# Patient Record
Sex: MALE | Race: WHITE | NOT HISPANIC OR LATINO | Employment: FULL TIME | URBAN - METROPOLITAN AREA
[De-identification: names, ages, dates, MRNs, and addresses within clinical notes are randomized per-mention and may not be internally consistent; named-entity substitution may affect disease eponyms.]

---

## 2017-03-26 ENCOUNTER — HOSPITAL ENCOUNTER (EMERGENCY)
Facility: CLINIC | Age: 22
Discharge: HOME | End: 2017-03-26
Attending: FAMILY MEDICINE | Admitting: FAMILY MEDICINE
Payer: COMMERCIAL

## 2017-03-26 VITALS
WEIGHT: 170 LBS | DIASTOLIC BLOOD PRESSURE: 75 MMHG | HEART RATE: 80 BPM | SYSTOLIC BLOOD PRESSURE: 136 MMHG | OXYGEN SATURATION: 100 % | BODY MASS INDEX: 25.85 KG/M2 | TEMPERATURE: 97.3 F | RESPIRATION RATE: 16 BRPM

## 2017-03-26 DIAGNOSIS — M62.830 BACK MUSCLE SPASM: ICD-10-CM

## 2017-03-26 PROCEDURE — 99213 OFFICE O/P EST LOW 20 MIN: CPT | Performed by: FAMILY MEDICINE

## 2017-03-26 PROCEDURE — 99212 OFFICE O/P EST SF 10 MIN: CPT

## 2017-03-26 RX ORDER — CYCLOBENZAPRINE HCL 5 MG
5 TABLET ORAL 3 TIMES DAILY PRN
Qty: 21 TABLET | Refills: 0 | Status: SHIPPED | OUTPATIENT
Start: 2017-03-26 | End: 2017-08-16

## 2017-03-26 RX ORDER — NABUMETONE 500 MG/1
500-1000 TABLET, FILM COATED ORAL 2 TIMES DAILY PRN
Qty: 30 TABLET | Refills: 1 | Status: SHIPPED | OUTPATIENT
Start: 2017-03-26 | End: 2017-08-16

## 2017-03-26 ASSESSMENT — ENCOUNTER SYMPTOMS
JOINT SWELLING: 0
GASTROINTESTINAL NEGATIVE: 1
ENDOCRINE NEGATIVE: 1
MYALGIAS: 0
CARDIOVASCULAR NEGATIVE: 1
RESPIRATORY NEGATIVE: 1
CONSTITUTIONAL NEGATIVE: 1
BACK PAIN: 1

## 2017-03-26 NOTE — ED AVS SNAPSHOT
[facility] Emergency Department    [address]    [place] [address]    Phone:  [phone]    Fax:  [phone]                                       Fran Staley   MRN: [id number]    Department:  [facility] Emergency Department   Date of Visit:  3/26/2017           Patient Information     Date Of Birth          1995        Your diagnoses for this visit were:     Back muscle spasm        You were seen by Gladys Jama MD.      Follow-up Information     Follow up with Meche Montalvo PA-C.    Specialty:  Physician Assistant    Why:  If symptoms worsen    Contact information:    [facility]  [address] [facility] [address]  [phone]          Discharge Instructions       When You Have Low Back Pain    Caring for Your Back  You are not alone.    Low back pain is very common. Nearly half of all adults have low back pain in any given year. The good news is that back pain is rarely a danger to your health. Most people can manage their back pain on their own and about half of them start feeling better within 2 weeks. In 9 out of 10 cases, low back pain goes away or no longer limits daily activity within 6 weeks.     Your outlook is good!    Your symptoms tell us that your low back pain is most likely not a danger to you. Most of the time we do not know the exact cause of low back pain, even if you see a doctor or have an MRI. However, treatment can still work without knowing the cause of the pain. Less than 1 in 100 people need surgery for their back pain.     What can I do about my low back pain?     There are three things you can do to ease low back pain and help it go away.    Use heat or cold packs.    Take medicine as directed.    Use positions, movements and exercises.     Using heat or cold packs    Try cold packs or gentle heat to ease your pain. Use whichever gives you the most relief. Apply the cold pack or heat for 15 minutes at a time, as  often as needed.    Taking medicine      If your doctor has prescribed medicine, be sure to follow the directions.    If you take over-the-counter medicine, read and follow the directions.    Talk to your doctor if you have any questions.     Using positions, movements and exercises    Research tells us that moving your joints and muscles can help you recover from back pain. Such activity should be simple and gentle. Use the positions in the photos as well as walking to help relieve your pain. Try taking a short walk every 3 to 4 hours during the day. Walk for a few minutes inside your home or take longer walks outside, on a treadmill or at a mall. Slowly increase the amount of time you walk. Expect discomfort when you begin, but it should lessen as your back starts to heal. When your back feels better, walk daily to keep your back and body healthy.    Finding a comfortable position    When your back pain is new, certain positions will ease your pain. Gently try each of the positions below until you find one that is helpful. Once you find a position of comfort, use it as often as you like when you are resting. You will recover faster if you combine rest with activity.         Lie on your back with your legs bent. You can do this by placing a pillow under your knees. Or you may lie on the floor and rest your lower legs on the seat of a chair.       Lie on your side with your knees bent, and place a pillow between your knees.       Lie on your stomach over pillows.      When should I call my doctor?    Your back pain should improve over the first couple of weeks. As it improves, you should be able to return to your normal activities. But call your doctor if:    You have a sudden change in your ability to control your bladder or bowels.    You feel tingling in your groin or legs.    The pain spreads down your leg and into your foot.    Your toes, feet or leg muscles feel weak.    You feel generally unwell or sick.     Your pain does not get better or gets worse.      If you are deaf or hard of hearing, please let us know. We provide many free services including sign language interpreters,oral interpreters, TTYs, telephone amplifiers, note takers and written materials.    For informational purposes only. Not to replace the advice of your health care provider. Copyright   2013 [facility]. All rights reserved. [employer] 840452 - Rev 06/14.    24 Hour Appointment Hotline       To make an appointment at any [facility] clinic, call [phone] ([phone]). If you don't have a family doctor or clinic, we will help you find one. [facility] clinics are conveniently located to serve the needs of you and your family.             Review of your medicines      START taking        Dose / Directions Last dose taken    cyclobenzaprine 5 MG tablet   Commonly known as:  FLEXERIL   Dose:  5 mg   Quantity:  21 tablet        Take 1 tablet (5 mg) by mouth 3 times daily as needed   Refills:  0        nabumetone 500 MG tablet   Commonly known as:  RELAFEN   Dose:  500-1000 mg   Quantity:  30 tablet        Take 1-2 tablets (500-1,000 mg) by mouth 2 times daily as needed for moderate pain   Refills:  1          Our records show that you are taking the medicines listed below. If these are incorrect, please call your family doctor or clinic.        Dose / Directions Last dose taken    NO ACTIVE MEDICATIONS        Refills:  0        order for DME   Quantity:  1 Device        Equipment being ordered: Thumb spica wrist brace   Refills:  0                Prescriptions were sent or printed at these locations (2 Prescriptions)                   [facility] [place] [id number] [address] [address]    Telephone:  [phone]   Fax:  [phone]   Hours:                  E-Prescribed (2 of 2)         nabumetone (RELAFEN) 500 MG tablet               cyclobenzaprine (FLEXERIL) 5 MG tablet                 Orders Needing Specimen Collection     None      Pending Results     No orders found from 3/24/2017 to 3/27/2017.            Pending Culture Results     No orders found from 3/24/2017 to 3/27/2017.             Test Results from your hospital stay            Thank you for choosing [facility]       Thank you for choosing [facility] for your care. Our goal is always to provide you with excellent care. Hearing back from our patients is one way we can continue to improve our services. Please take a few minutes to complete the written survey that you may receive in the mail after you visit with us. Thank you!        [employer] Information     [employer] gives you secure access to your electronic health record. If you see a primary care provider, you can also send messages to your care team and make appointments. If you have questions, please call your primary care clinic.  If you do not have a primary care provider, please call [phone] and they will assist you.        Care EveryWhere ID     This is your Care EveryWhere ID. This could be used by other organizations to access your [facility] medical records  [id number]        After Visit Summary       This is your record. Keep this with you and show to your community pharmacist(s) and doctor(s) at your next visit.

## 2017-03-26 NOTE — ED AVS SNAPSHOT
[facility] Emergency Department    [address] [address]    Phone:  [phone]    Fax:  [phone]                                       Fran Staley   MRN: [id number]    Department:  [facility] Emergency Department   Date of Visit:  3/26/2017           After Visit Summary Signature Page     I have received my discharge instructions, and my questions have been answered. I have discussed any challenges I see with this plan with the nurse or doctor.    ..........................................................................................................................................  Patient/Patient Representative Signature      ..........................................................................................................................................  Patient Representative Print Name and Relationship to Patient    ..................................................               ................................................  Date                                            Time    ..........................................................................................................................................  Reviewed by Signature/Title    ...................................................              ..............................................  Date                                                            Time

## 2017-03-27 NOTE — ED PROVIDER NOTES
History     Chief Complaint   Patient presents with     Back Pain     HPI  Fran Staley is a 21 year old male who presents with back spasms. Started suddenly this afternoon while having lunch . He does not recollect any injury. He did mention that he was at the gym on Friday but has not done any heavy lifting in the last few days. He has had this on occasion in the past , and he says the spasms come out of nowhere . He has not been to a physical the therapist for this in the past . Patient denies any radiation into his lower legs.   Patient reports that he has not used any medication for this .     I have reviewed the Medications, Allergies, Past Medical and Surgical History, and Social History in the [facility] system.  History reviewed. No pertinent past medical history.    Review of Systems   Constitutional: Negative.    HENT: Negative.    Respiratory: Negative.    Cardiovascular: Negative.    Gastrointestinal: Negative.    Endocrine: Negative.    Genitourinary: Negative.    Musculoskeletal: Positive for back pain. Negative for gait problem, joint swelling and myalgias.   Skin: Negative.        Physical Exam   BP: 136/75  Pulse: 80  Temp: 97.3  F (36.3  C)  Resp: 16  Weight: 77.1 kg (170 lb)  SpO2: 100 %  Physical Exam   Constitutional: He appears well-developed and well-nourished. No distress.   HENT:   Head: Normocephalic and atraumatic.   Eyes: Pupils are equal, round, and reactive to light.   Neck: Normal range of motion. Neck supple.   Cardiovascular: Normal rate, regular rhythm and normal heart sounds.    Pulmonary/Chest: Effort normal and breath sounds normal. No respiratory distress. He has no wheezes. He has no rales. He exhibits no tenderness.   Abdominal: Soft. Bowel sounds are normal.   Musculoskeletal:        Lumbar back: He exhibits decreased range of motion, tenderness, pain and spasm. He exhibits no bony tenderness, no swelling, no edema and no deformity.   Skin: He is not diaphoretic.       ED  Course     ED Course     Procedures                 Labs Ordered and Resulted from Time of ED Arrival Up to the Time of Departure from the ED - No data to display    Assessments & Plan (with Medical Decision Making)     I have reviewed the nursing notes.    I have reviewed the findings, diagnosis, plan and need for follow up with the patient.    New Prescriptions    CYCLOBENZAPRINE (FLEXERIL) 5 MG TABLET    Take 1 tablet (5 mg) by mouth 3 times daily as needed    NABUMETONE (RELAFEN) 500 MG TABLET    Take 1-2 tablets (500-1,000 mg) by mouth 2 times daily as needed for moderate pain       Final diagnoses:   Back muscle spasm   Recommended that he take medication . He will need physical therapy at some point.     3/26/2017   [facility] EMERGENCY DEPARTMENT     Gladys Jama MD  03/26/17 4804

## 2017-03-27 NOTE — DISCHARGE INSTRUCTIONS
When You Have Low Back Pain    Caring for Your Back  You are not alone.    Low back pain is very common. Nearly half of all adults have low back pain in any given year. The good news is that back pain is rarely a danger to your health. Most people can manage their back pain on their own and about half of them start feeling better within 2 weeks. In 9 out of 10 cases, low back pain goes away or no longer limits daily activity within 6 weeks.     Your outlook is good!    Your symptoms tell us that your low back pain is most likely not a danger to you. Most of the time we do not know the exact cause of low back pain, even if you see a doctor or have an MRI. However, treatment can still work without knowing the cause of the pain. Less than 1 in 100 people need surgery for their back pain.     What can I do about my low back pain?     There are three things you can do to ease low back pain and help it go away.    Use heat or cold packs.    Take medicine as directed.    Use positions, movements and exercises.     Using heat or cold packs    Try cold packs or gentle heat to ease your pain. Use whichever gives you the most relief. Apply the cold pack or heat for 15 minutes at a time, as often as needed.    Taking medicine      If your doctor has prescribed medicine, be sure to follow the directions.    If you take over-the-counter medicine, read and follow the directions.    Talk to your doctor if you have any questions.     Using positions, movements and exercises    Research tells us that moving your joints and muscles can help you recover from back pain. Such activity should be simple and gentle. Use the positions in the photos as well as walking to help relieve your pain. Try taking a short walk every 3 to 4 hours during the day. Walk for a few minutes inside your home or take longer walks outside, on a treadmill or at a mall. Slowly increase the amount of time you walk. Expect discomfort when you begin, but it should  lessen as your back starts to heal. When your back feels better, walk daily to keep your back and body healthy.    Finding a comfortable position    When your back pain is new, certain positions will ease your pain. Gently try each of the positions below until you find one that is helpful. Once you find a position of comfort, use it as often as you like when you are resting. You will recover faster if you combine rest with activity.         Lie on your back with your legs bent. You can do this by placing a pillow under your knees. Or you may lie on the floor and rest your lower legs on the seat of a chair.       Lie on your side with your knees bent, and place a pillow between your knees.       Lie on your stomach over pillows.      When should I call my doctor?    Your back pain should improve over the first couple of weeks. As it improves, you should be able to return to your normal activities. But call your doctor if:    You have a sudden change in your ability to control your bladder or bowels.    You feel tingling in your groin or legs.    The pain spreads down your leg and into your foot.    Your toes, feet or leg muscles feel weak.    You feel generally unwell or sick.    Your pain does not get better or gets worse.      If you are deaf or hard of hearing, please let us know. We provide many free services including sign language interpreters,oral interpreters, TTYs, telephone amplifiers, note takers and written materials.    For informational purposes only. Not to replace the advice of your health care provider. Copyright   2013 [facility]. All rights reserved. [employer] 219632 - Rev 06/14.

## 2017-07-26 ENCOUNTER — THERAPY VISIT (OUTPATIENT)
Dept: CHIROPRACTIC MEDICINE | Facility: CLINIC | Age: 22
End: 2017-07-26
Payer: COMMERCIAL

## 2017-07-26 DIAGNOSIS — M99.01 SEGMENTAL DYSFUNCTION OF CERVICAL REGION: Primary | ICD-10-CM

## 2017-07-26 DIAGNOSIS — M99.02 SEGMENTAL DYSFUNCTION OF THORACIC REGION: ICD-10-CM

## 2017-07-26 DIAGNOSIS — M54.6 BILATERAL THORACIC BACK PAIN: ICD-10-CM

## 2017-07-26 PROCEDURE — 99202 OFFICE O/P NEW SF 15 MIN: CPT | Mod: 25 | Performed by: CHIROPRACTOR

## 2017-07-26 PROCEDURE — 98940 CHIROPRACT MANJ 1-2 REGIONS: CPT | Mod: AT | Performed by: CHIROPRACTOR

## 2017-07-26 PROCEDURE — 97035 APP MDLTY 1+ULTRASOUND EA 15: CPT | Performed by: CHIROPRACTOR

## 2017-07-26 NOTE — MR AVS SNAPSHOT
After Visit Summary   7/26/2017    Fran Staley    MRN: [id number]           Patient Information     Date Of Birth          1995        Visit Information        Provider Department      7/26/2017 2:00 PM Susan Ramos DC [facility]        Today's Diagnoses     Segmental dysfunction of cervical region    -  1    Segmental dysfunction of thoracic region        Bilateral thoracic back pain           Follow-ups after your visit        Who to contact     If you have questions or need follow up information about today's clinic visit or your schedule please contact [facility] directly at [phone].  Normal or non-critical lab and imaging results will be communicated to you by [employer], letter or phone within 4 business days after the clinic has received the results. If you do not hear from us within 7 days, please contact the clinic through [employer] or phone. If you have a critical or abnormal lab result, we will notify you by phone as soon as possible.  Submit refill requests through [employer] or call your pharmacy and they will forward the refill request to us. Please allow 3 business days for your refill to be completed.          Additional Information About Your Visit        MyChart Information     [employer] gives you secure access to your electronic health record. If you see a primary care provider, you can also send messages to your care team and make appointments. If you have questions, please call your primary care clinic.  If you do not have a primary care provider, please call [phone] and they will assist you.        Care EveryWhere ID     This is your Care EveryWhere ID. This could be used by other organizations to access your [facility] medical records  [id number]         Blood Pressure from Last 3 Encounters:   03/26/17 136/75   10/09/16 140/78   08/01/16 118/68    Weight from Last 3 Encounters:   03/26/17 77.1 kg (170 lb)    08/01/16 76.2 kg (168 lb)   08/01/16 76.2 kg (168 lb)              We Performed the Following     CHIROPRAC MANIP,SPINAL,1-2 REGIONS     OFFICE/OUTPT VISIT,NEW,LEVL II     ULTRASOUND THERAPY        Primary Care Provider Office Phone # Fax #    Meche Montalvo PA-C [phone]       [facility] [address] [address]        Equal Access to Services     VAMSHI ABBASI : Hadii aad ku hadasho Soomaali, waaxda luqadaha, qaybta kaalmada adeegyada, waxay idiin hayaan adeeg kharash la'kendy . So [facility] [phone].    ATENCIÓN: Si sammie tran, tiene a reed disposición servicios gratuitos de asistencia lingüística. [facility] [phone].    We comply with applicable federal civil rights laws and [place] laws. We do not discriminate on the basis of race, color, national origin, age, disability sex, sexual orientation or gender identity.            Thank you!     Thank you for choosing [facility]  for your care. Our goal is always to provide you with excellent care. Hearing back from our patients is one way we can continue to improve our services. Please take a few minutes to complete the written survey that you may receive in the mail after your visit with us. Thank you!             Your Updated Medication List - Protect others around you: Learn how to safely use, store and throw away your medicines at www.disposemymeds.org.          This list is accurate as of: 7/26/17  2:43 PM.  Always use your most recent med list.                   Brand Name Dispense Instructions for use Diagnosis    cyclobenzaprine 5 MG tablet    FLEXERIL    21 tablet    Take 1 tablet (5 mg) by mouth 3 times daily as needed        nabumetone 500 MG tablet    RELAFEN    30 tablet    Take 1-2 tablets (500-1,000 mg) by mouth 2 times daily as needed for moderate pain        NO ACTIVE MEDICATIONS           order for DME     1 Device    Equipment being ordered: Thumb spica wrist brace     Injury of right thumb, initial encounter, Sprain of metacarpophalangeal joint of right thumb, initial encounter

## 2017-07-26 NOTE — PROGRESS NOTES
Chiropractic Clinic Visit    PCP: Meche Montalvo    Fran Staley is a 22 year old male who is seen as a self referral presenting with pain between the shoulder blades on both sides of his spine. This episode started this past Sunday, he states the pain was present when he woke up. He has had episodes like this before that last about a week, the most recent being this past May, for past episodes he has seen a chiropractor, which helped. For this episode he has tried ibuprofen which helped. Since Sunday he feels like the pain has gotten worse. He describes the pain as sharp, but when he moves side to side or twists he feels the muscle get tight. He also noticed yesterday that his right thumb was numb but that feels better today. He denies pain shooting pain down his arm. Also denies headaches. He currently rates the pain as 8/10, described as dull achy. If he takes a deep breath he notices a sharp pain. At its worse the pain gets to 10/10. He has been sleeping ok. He states that he pain is the same through out the day.     Injury: denies     Location of Pain: bilateral mid back   Duration of Pain: since sunday   Rating of Pain at worst: 10/10  Rating of Pain Currently: 8/10  Symptoms are better with: ibuprofen   Symptoms are worse with: twisting, taking a deep breath        Health History  as reported by the patient:    How does the patient rate their own health:   Good    Current or past medical history:   No red flags identified    Medical allergies  As seen above    Past Traumas/Surgeries  Dislocated right thumb last year- went in for it- had ripped ligaments- was supposed to have surgery but did not    Family History  Diabetes    Medications:  Pain    Occupation:  Tar paving     Primary job tasks:   Driving, Lifting/carrying, Prolonged standing and Repetitive tasks    Barriers as home/work:   Work hasn't been too bad the last few days- hasn't been doing as much labor         Review of  Systems  Musculoskeletal: as above  Remainder of review of systems is negative including constitutional, CV, pulmonary, GI, Skin and Neurologic except as noted in HPI or medical history.    No past medical history on file.  Past Surgical History:   Procedure Laterality Date     none         Objective  There were no vitals taken for this visit.    GENERAL APPEARANCE: healthy, alert and no distress   GAIT: NORMAL  SKIN: no suspicious lesions or rashes  NEURO: Normal strength and tone, mentation intact and speech normal  PSYCH:  mentation appears normal and affect normal/bright    Fran was asked to complete the Neck Disability Index, today in the office. Deya Start Back score: 4; subscore 1; pain severity scale: 8/10..    Cervical Spine Exam    Range of Motion:         forward flexion WNL         extension WNL- pain         lateral rotation WNL        lateral flexion WNL- pain    Inspection:         No visible deformity        normal lordotic curvature maintained    Tender:        paraspinal muscles      Muscle strength:       C4 (shoulder shrug)  symmetric 5/5       C5 (shoulder abduction) symmetric 5/5       C6 (elbow flexion) symmetric 5/5       C7 (elbow extension) symmetric 5/5       C8 (finger abduction, thumb flexion) symmetric 5/5    Reflexes:        C5 (biceps) symmetric normal       C6 (supinator) symmetric normal       C7 (triceps) symmetric normal      Special Tests:       neg (-) Spurling  Distraction - Positive- painful, Shoulder depression - Right negative and Left negative and Cervical compression- negative bilateral     Lymphatics:        no edema noted in the upper extremities       Segmental spinal dysfunction/restrictions found at:  :  C2 RR, LRR  C5 LR, RRR  T1 RR, LRR  T5 F, ER  T8 F, ER.      Trigger points were found in:T-spine paraspinal and Traps    Muscle spasm found in:T-spine paraspinal and Traps      Radiology:  Not indicated at this time- will consider if no improvement with  conservative management.    Assessment:    No diagnosis found.    RX ordered/plan of care: Mechanical back pain and rib dysfunction with associated myospasm and intersegmental dysfunction.  Anticipated outcomes: Patient is expected to receive benefit with care.  Possible risks and side effects: Minimal soreness expected post-adjustment.     After discussing the risk and benefits of care, patient consented to treatment.    Patient's condition:  Patient had restrictions pre-manipulation    Treatment effectiveness:  Post manipulation there is better intersegmental movement and Patient claims to feel looser post manipulation    Plan:    Procedures:    Evaluation and Management:  44456 Low to moderate level exam 20 min    CMT:  87690 Chiropractic manipulative treatment 1-2 regions performed   Cervical: Diversified, C2, C5 , Supine  Thoracic: Diversified, T1, T5, T8, Prone    Modalities:  74821: US:  1.0 Adams/cm squared for 8 minutes at 1.0mhz  continuous pulsed, Location:bilateral thoracic paraspinals     Therapeutic procedures:  None      Prognosis: Excellent      Treatment plan and goals:  Goals:  Decrease pain from 8/10 to 4/10 in 4 visits  Be able to twist without pain       Frequency of care  Duration of care is estimated to be 8 weeks, from the initial treatment.  It is estimated that the patient will need a total of 8 visits to resolve this episode.  For the initial therapeutic trial of care, the frequency is recommended at 1-2 X week, once daily.  A reevaluation would be clinically appropriate in 8 visits, to determine progress and further course of care.    In-Office Treatment  Evaluation  Spinal Chiropractic Manipulative Therapy:  Trial of care. Will re-evaluate in 8 visits.       Recommendations:    Instructions:ice 20 minutes every other hour as needed    Follow-up:  Return to care in 1 week. Do to patients work schedule, he will call when he can come in.      Disclaimer: This note consists of symbols  derived from keyboarding, dictation and/or voice recognition software. As a result, there may be errors in the script that have gone undetected. Please consider this when interpreting information found in this chart.

## 2017-08-16 ENCOUNTER — OFFICE VISIT (OUTPATIENT)
Dept: BEHAVIORAL HEALTH | Facility: CLINIC | Age: 22
End: 2017-08-16
Payer: COMMERCIAL

## 2017-08-16 ENCOUNTER — OFFICE VISIT (OUTPATIENT)
Dept: FAMILY MEDICINE | Facility: CLINIC | Age: 22
End: 2017-08-16
Payer: COMMERCIAL

## 2017-08-16 ENCOUNTER — DOCUMENTATION ONLY (OUTPATIENT)
Dept: FAMILY MEDICINE | Facility: CLINIC | Age: 22
End: 2017-08-16

## 2017-08-16 VITALS
HEART RATE: 82 BPM | RESPIRATION RATE: 20 BRPM | TEMPERATURE: 97.3 F | SYSTOLIC BLOOD PRESSURE: 130 MMHG | WEIGHT: 178.3 LBS | DIASTOLIC BLOOD PRESSURE: 86 MMHG | OXYGEN SATURATION: 97 % | HEIGHT: 68 IN | BODY MASS INDEX: 27.02 KG/M2

## 2017-08-16 DIAGNOSIS — F41.1 GAD (GENERALIZED ANXIETY DISORDER): Primary | ICD-10-CM

## 2017-08-16 DIAGNOSIS — F41.9 ANXIETY: Primary | ICD-10-CM

## 2017-08-16 PROCEDURE — 99214 OFFICE O/P EST MOD 30 MIN: CPT | Performed by: FAMILY MEDICINE

## 2017-08-16 PROCEDURE — 99207 ZZC NO CHARGE BEHAVIORAL WARM HANDOFF: CPT | Performed by: MARRIAGE & FAMILY THERAPIST

## 2017-08-16 ASSESSMENT — PAIN SCALES - GENERAL: PAINLEVEL: NO PAIN (0)

## 2017-08-16 NOTE — NURSING NOTE
"Chief Complaint   Patient presents with     Anxiety       Initial /86 (BP Location: Left arm, Patient Position: Chair, Cuff Size: Adult Regular)  Pulse 82  Temp 97.3  F (36.3  C) (Temporal)  Resp 20  Ht 5' 8.11\" (1.73 m)  Wt 178 lb 4.8 oz (80.9 kg)  SpO2 97%  BMI 27.02 kg/m2 Estimated body mass index is 27.02 kg/(m^2) as calculated from the following:    Height as of this encounter: 5' 8.11\" (1.73 m).    Weight as of this encounter: 178 lb 4.8 oz (80.9 kg).  Medication Reconciliation: complete   Sanna Thornton CMA     Health Maintenance Due   Topic Date Due     HPV IMMUNIZATION (1 of 3 - Male 3 Dose Series) 04/30/2007     PEDS DTAP/TDAP (2 - Td) 11/14/2007       Health Maintenance reviewed at today's visit patient asked to schedule/complete:   Patient made aware.       "

## 2017-08-16 NOTE — MR AVS SNAPSHOT
After Visit Summary   8/16/2017    Fran Staley    MRN: [id number]           Patient Information     Date Of Birth          1995        Visit Information        Provider Department      8/16/2017 3:20 PM Anurag Hendrickson MD [facility]        Today's Diagnoses     SABINA (generalized anxiety disorder)    -  1       Follow-ups after your visit        Your next 10 appointments already scheduled     Aug 28, 2017  4:00 PM CDT   New Visit with MICKY Sol   [facility] ([facility])    [address] [place] [id number] [phone]            Sep 05, 2017  3:20 PM CDT   SHORT with Anurag Hendrickson MD   [facility] ([address] [address] [phone]              Who to contact     If you have questions or need follow up information about today's clinic visit or your schedule please contact [facility] directly at [phone].  Normal or non-critical lab and imaging results will be communicated to you by Excelimmunehart, letter or phone within 4 business days after the clinic has received the results. If you do not hear from us within 7 days, please contact the clinic through Excelimmunehart or phone. If you have a critical or abnormal lab result, we will notify you by phone as soon as possible.  Submit refill requests through Mobile Bridge or call your pharmacy and they will forward the refill request to us. Please allow 3 business days for your refill to be completed.          Additional Information About Your Visit        MyChart Information     Mobile Bridge gives you secure access to your electronic health record. If you see a primary care provider, you can also send messages to your care team and make appointments. If you have questions, please call your primary care clinic.  If you do not have a primary care provider, please call [phone] and they will  "assist you.        Care EveryWhere ID     This is your Care EveryWhere ID. This could be used by other organizations to access your [place] medical records  [id number]        Your Vitals Were     Pulse Temperature Respirations Height Pulse Oximetry BMI (Body Mass Index)    82 97.3  F (36.3  C) (Temporal) 20 5' 8.11\" (1.73 m) 97% 27.02 kg/m2       Blood Pressure from Last 3 Encounters:   08/16/17 130/86   03/26/17 136/75   10/09/16 140/78    Weight from Last 3 Encounters:   08/16/17 178 lb 4.8 oz (80.9 kg)   03/26/17 170 lb (77.1 kg)   08/01/16 168 lb (76.2 kg)              We Performed the Following     Basic metabolic panel     TSH with free T4 reflex          Today's Medication Changes          These changes are accurate as of: 8/16/17  5:00 PM.  If you have any questions, ask your nurse or doctor.               Start taking these medicines.        Dose/Directions    FLUoxetine 20 MG capsule   Commonly known as:  PROzac   Used for:  SABINA (generalized anxiety disorder)   Started by:  Anurag Hendrickson MD        Dose:  20 mg   Take 1 capsule (20 mg) by mouth daily   Quantity:  30 capsule   Refills:  3            Where to get your medicines      These medications were sent to [facility] [place] [id number] [facility]   [address] [facility] , [facility] [address]     Phone:  [phone]     FLUoxetine 20 MG capsule                Primary Care Provider Office Phone # Fax #    Meche Montalvo PA-C [phone] [address] [place] [address]        Equal Access to Services     [facility]: Hadii gray reese hadasho Soomaali, waaxda luqadaha, qaybta kaalmada adeegernesto, vinod forman. So [facility] [phone].    ATENCIÓN: Si habla español, tiene a reed disposición servicios gratuitos de asistencia lingüística. Llame al [phone].    We comply with applicable federal civil rights laws and [place] laws. We do not discriminate on the basis of race, " color, national origin, age, disability sex, sexual orientation or gender identity.            Thank you!     Thank you for choosing [facility]  for your care. Our goal is always to provide you with excellent care. Hearing back from our patients is one way we can continue to improve our services. Please take a few minutes to complete the written survey that you may receive in the mail after your visit with us. Thank you!             Your Updated Medication List - Protect others around you: Learn how to safely use, store and throw away your medicines at www.disposemymeds.org.          This list is accurate as of: 8/16/17  5:00 PM.  Always use your most recent med list.                   Brand Name Dispense Instructions for use Diagnosis    FLUoxetine 20 MG capsule    PROzac    30 capsule    Take 1 capsule (20 mg) by mouth daily    SABINA (generalized anxiety disorder)

## 2017-08-16 NOTE — MR AVS SNAPSHOT
After Visit Summary   8/16/2017    Fran Staley    MRN: [id number]           Patient Information     Date Of Birth          1995        Visit Information        Provider Department      8/16/2017 2:53 PM Nereyda See LMFT [facility]        Today's Diagnoses     Anxiety    -  1       Follow-ups after your visit        Your next 10 appointments already scheduled     Aug 28, 2017  4:00 PM CDT   New Visit with MICKY Sol   [facility]    [address] [place] [id number] [phone]            Sep 05, 2017  3:20 PM CDT   SHORT with Anurag Hendrickson MD   [facility]    [address] [place] [id number] [phone]              Future tests that were ordered for you today     Open Future Orders        Priority Expected Expires Ordered    TSH with free T4 reflex Routine  8/23/2017 8/16/2017    Basic metabolic panel  (Ca, Cl, CO2, Creat, Gluc, K, Na, BUN) Routine  8/23/2017 8/16/2017            Who to contact     If you have questions or need follow up information about today's clinic visit or your schedule please contact [facility] directly at [phone].  Normal or non-critical lab and imaging results will be communicated to you by FoneStarz Mediahart, letter or phone within 4 business days after the clinic has received the results. If you do not hear from us within 7 days, please contact the clinic through FoneStar[employer] Mediahart or phone. If you have a critical or abnormal lab result, we will notify you by phone as soon as possible.  Submit refill requests through Balihoo or call your pharmacy and they will forward the refill request to us. Please allow 3 business days for your refill to be completed.          Additional Information About Your Visit        FoneStarz MediaharBox Information     Balihoo gives you secure access to your electronic health record. If you see a  primary care provider, you can also send messages to your care team and make appointments. If you have questions, please call your primary care clinic.  If you do not have a primary care provider, please call [phone] and they will assist you.        Care EveryWhere ID     This is your Care EveryWhere ID. This could be used by other organizations to access your [place] medical records  [id number]         Blood Pressure from Last 3 Encounters:   08/16/17 130/86   03/26/17 136/75   10/09/16 140/78    Weight from Last 3 Encounters:   08/16/17 80.9 kg (178 lb 4.8 oz)   03/26/17 77.1 kg (170 lb)   08/01/16 76.2 kg (168 lb)              Today, you had the following     No orders found for display         Today's Medication Changes          These changes are accurate as of: 8/16/17 11:59 PM.  If you have any questions, ask your nurse or doctor.               Start taking these medicines.        Dose/Directions    FLUoxetine 20 MG capsule   Commonly known as:  PROzac   Used for:  SABINA (generalized anxiety disorder)   Started by:  Anurag Hendrickson MD        Dose:  20 mg   Take 1 capsule (20 mg) by mouth daily   Quantity:  30 capsule   Refills:  3            Where to get your medicines      These medications were sent to [facility] [address]   [address] [facility] , [facility] [address]     Phone:  [phone]     FLUoxetine 20 MG capsule                Primary Care Provider Office Phone # Fax #    Meche Montalvo PA-C [phone] [address] [place] [address]        Equal Access to Services     [facility]: Hadii aad ku hadasho Soomaali, waaxda luqadaha, qaybta kaalmada darrell, vinod forman. So [facility] [phone].    ATENCIÓN: Si habla español, tiene a reed disposición servicios gratuitos de asistencia lingüística. Llame al [phone].    We comply with applicable federal civil rights laws and [place] laws. We  do not discriminate on the basis of race, color, national origin, age, disability sex, sexual orientation or gender identity.            Thank you!     Thank you for choosing [facility]  for your care. Our goal is always to provide you with excellent care. Hearing back from our patients is one way we can continue to improve our services. Please take a few minutes to complete the written survey that you may receive in the mail after your visit with us. Thank you!             Your Updated Medication List - Protect others around you: Learn how to safely use, store and throw away your medicines at www.disposemymeds.org.          This list is accurate as of: 8/16/17 11:59 PM.  Always use your most recent med list.                   Brand Name Dispense Instructions for use Diagnosis    FLUoxetine 20 MG capsule    PROzac    30 capsule    Take 1 capsule (20 mg) by mouth daily    SABINA (generalized anxiety disorder)

## 2017-08-16 NOTE — PROGRESS NOTES
Patient did not show up for lab. Orders were canceled and reordered as future for 1 week.  Please contact patient to come back in.  Thanks, Elvia Albarado

## 2017-08-16 NOTE — PROGRESS NOTES
"  SUBJECTIVE:                                                    Fran Staley is a 22 year old male who presents to clinic today for the following health issues:    Chief Complaint   Patient presents with     Anxiety        Abnormal Mood Symptoms  Onset: 10 months    Description:   Depression: no  Anxiety: YES- Jumpy, on edge, panic attacks, uncontrollable worrying    Accompanying Signs & Symptoms:  Still participating in activities that you used to enjoy: no  Fatigue: YES  Irritability: no   Difficulty concentrating: YES  Changes in appetite: no  Problems with sleep: YES  Heart racing/beating fast : YES  Thoughts of hurting yourself or others: none    History:   Recent stress: YES- new father  Prior depression hospitalization: None  Family history of depression: no  Family history of anxiety: no    Precipitating factors:   Alcohol/drug use: no    Alleviating factors:  n/a    Therapies Tried and outcome: None    No flowsheet data found.    No flowsheet data found.                   ROS:  Constitutional, HEENT, cardiovascular, pulmonary, gi and gu systems are negative, except as otherwise noted.      OBJECTIVE:                                                    /86 (BP Location: Left arm, Patient Position: Chair, Cuff Size: Adult Regular)  Pulse 82  Temp 97.3  F (36.3  C) (Temporal)  Resp 20  Ht 5' 8.11\" (1.73 m)  Wt 178 lb 4.8 oz (80.9 kg)  SpO2 97%  BMI 27.02 kg/m2  Body mass index is 27.02 kg/(m^2).    Well-appearing and in no distress. Mood \"okay\". Affect, mentation appropriate. Insight and judgment intact. speech normal rate and content. No evidence of kareem, SI, HI. No psychosis. Anxious      Diagnostic Test Results:  none      ASSESSMENT/PLAN:                                                        ICD-10-CM    1. SABINA (generalized anxiety disorder) F41.1 FLUoxetine (PROZAC) 20 MG capsule     TSH with free T4 reflex     Basic metabolic panel       Discussed generalized anxiety disorder, and " etiology and treatment options. He elects to try counseling and medication, which I think is wise given the severity of his anxiety today. Routine labs will be checked. Follow-up with me in 2-4 weeks for recheck. Call sooner if any concerns. Black box warning discussed.    Anurag Hendrickson MD  [facility]

## 2017-08-17 NOTE — PROGRESS NOTES
Warm-handoff    C met with patient, by PCP request. C informed and explained integrated health model, use of brief therapy interventions, as well as referrals and support services for ongoing long-term therapy.  Patient reports that he has been feeling more anxious and has been having daily panic attacks since around Thanksgiving of last year. He states that he is not sure what the trigger is. He states that he is a new parent, however his baby is only a couple months old. Patient will be starting a medication today, which he disclosed he's nervous about, however is willing to give it a try. Patient scheduled an initial visit with this writer for 8/28/17.

## 2017-08-27 ENCOUNTER — HEALTH MAINTENANCE LETTER (OUTPATIENT)
Age: 22
End: 2017-08-27

## 2017-10-17 ENCOUNTER — OFFICE VISIT (OUTPATIENT)
Dept: FAMILY MEDICINE | Facility: CLINIC | Age: 22
End: 2017-10-17
Payer: COMMERCIAL

## 2017-10-17 VITALS
HEART RATE: 61 BPM | WEIGHT: 182 LBS | HEIGHT: 69 IN | TEMPERATURE: 97.9 F | RESPIRATION RATE: 14 BRPM | SYSTOLIC BLOOD PRESSURE: 122 MMHG | DIASTOLIC BLOOD PRESSURE: 82 MMHG | BODY MASS INDEX: 26.96 KG/M2 | OXYGEN SATURATION: 96 %

## 2017-10-17 DIAGNOSIS — Z23 NEED FOR PROPHYLACTIC VACCINATION AND INOCULATION AGAINST INFLUENZA: ICD-10-CM

## 2017-10-17 DIAGNOSIS — Z23 NEED FOR VACCINATION: ICD-10-CM

## 2017-10-17 DIAGNOSIS — Z00.00 ROUTINE GENERAL MEDICAL EXAMINATION AT A HEALTH CARE FACILITY: Primary | ICD-10-CM

## 2017-10-17 DIAGNOSIS — F41.1 GAD (GENERALIZED ANXIETY DISORDER): ICD-10-CM

## 2017-10-17 PROBLEM — M99.02 SEGMENTAL DYSFUNCTION OF THORACIC REGION: Status: RESOLVED | Noted: 2017-07-26 | Resolved: 2017-10-17

## 2017-10-17 PROBLEM — M99.01 SEGMENTAL DYSFUNCTION OF CERVICAL REGION: Status: RESOLVED | Noted: 2017-07-26 | Resolved: 2017-10-17

## 2017-10-17 PROCEDURE — 90715 TDAP VACCINE 7 YRS/> IM: CPT | Performed by: FAMILY MEDICINE

## 2017-10-17 PROCEDURE — 99395 PREV VISIT EST AGE 18-39: CPT | Mod: 25 | Performed by: FAMILY MEDICINE

## 2017-10-17 PROCEDURE — 90472 IMMUNIZATION ADMIN EACH ADD: CPT | Performed by: FAMILY MEDICINE

## 2017-10-17 PROCEDURE — 99213 OFFICE O/P EST LOW 20 MIN: CPT | Mod: 25 | Performed by: FAMILY MEDICINE

## 2017-10-17 PROCEDURE — 90471 IMMUNIZATION ADMIN: CPT | Performed by: FAMILY MEDICINE

## 2017-10-17 PROCEDURE — 90686 IIV4 VACC NO PRSV 0.5 ML IM: CPT | Performed by: FAMILY MEDICINE

## 2017-10-17 ASSESSMENT — PAIN SCALES - GENERAL: PAINLEVEL: NO PAIN (0)

## 2017-10-17 NOTE — NURSING NOTE
"Chief Complaint   Patient presents with     Physical       Initial /82 (BP Location: Right arm, Patient Position: Sitting, Cuff Size: Adult Regular)  Pulse 61  Temp 97.9  F (36.6  C) (Temporal)  Resp 14  Ht 5' 9\" (1.753 m)  Wt 182 lb (82.6 kg)  SpO2 96%  BMI 26.88 kg/m2 Estimated body mass index is 26.88 kg/(m^2) as calculated from the following:    Height as of this encounter: 5' 9\" (1.753 m).    Weight as of this encounter: 182 lb (82.6 kg).  Medication Reconciliation: complete     Elise Mora MA 10/17/2017    Screening Questionnaire for Adult Immunization    Are you sick today?   No   Do you have allergies to medications, food, a vaccine component or latex?   No   Have you ever had a serious reaction after receiving a vaccination?   No   Do you have a long-term health problem with heart disease, lung disease, asthma, kidney disease, metabolic disease (e.g. diabetes), anemia, or other blood disorder?   No   Do you have cancer, leukemia, HIV/AIDS, or any other immune system problem?   No   In the past 3 months, have you taken medications that affect  your immune system, such as prednisone, other steroids, or anticancer drugs; drugs for the treatment of rheumatoid arthritis, Crohn s disease, or psoriasis; or have you had radiation treatments?   No   Have you had a seizure, or a brain or other nervous system problem?   No   During the past year, have you received a transfusion of blood or blood     products, or been given immune (gamma) globulin or antiviral drug?   No   For women: Are you pregnant or is there a chance you could become        pregnant during the next month?   No   Have you received any vaccinations in the past 4 weeks?   No     Immunization questionnaire answers were all negative.        Per orders of Dr. Hendrickson, injection of Adacel given by Gianna Mora. Patient instructed to remain in clinic for 15 minutes afterwards, and to report any adverse reaction to me immediately.     "   Screening performed by Gianna Mora on 10/17/2017 at 2:46 PM.

## 2017-10-17 NOTE — PROGRESS NOTES
SUBJECTIVE:   CC: Fran Staley is an 22 year old male who presents for preventative health visit.     Physical   Annual:     Getting at least 3 servings of Calcium per day::  Yes    Bi-annual eye exam::  NO    Dental care twice a year::  Yes    Sleep apnea or symptoms of sleep apnea::  None    Diet::  Regular (no restrictions)    Frequency of exercise::  2-3 days/week    Duration of exercise::  30-45 minutes    Taking medications regularly::  Yes    Medication side effects::  None    Additional concerns today::  YES      Anxiety at night is worse.     Better with prozac, but still bad in the evening      Today's PHQ-2 Score:   PHQ-2 ( 1999 Pfizer) 10/17/2017   Q1: Little interest or pleasure in doing things 0   Q2: Feeling down, depressed or hopeless 0   PHQ-2 Score 0   Q1: Little interest or pleasure in doing things Not at all   Q2: Feeling down, depressed or hopeless Not at all   PHQ-2 Score 0       Abuse: Current or Past(Physical, Sexual or Emotional)- No  Do you feel safe in your environment - Yes    Social History   Substance Use Topics     Smoking status: Never Smoker     Smokeless tobacco: Never Used     Alcohol use Yes      Comment: once a week     The patient does not drink >3 drinks per day nor >7 drinks per week.    Last PSA: No results found for: PSA    Reviewed orders with patient. Reviewed health maintenance and updated orders accordingly - Yes      Reviewed and updated as needed this visit by clinical staff  Tobacco  Allergies  Meds  Soc Hx        Reviewed and updated as needed this visit by Provider            ROS:  C: NEGATIVE for fever, chills, change in weight  I: NEGATIVE for worrisome rashes, moles or lesions  E: NEGATIVE for vision changes or irritation  ENT: NEGATIVE for ear, mouth and throat problems  R: NEGATIVE for significant cough or SOB  CV: NEGATIVE for chest pain, palpitations or peripheral edema  GI: NEGATIVE for nausea, abdominal pain, heartburn, or change in bowel habits    "male: negative for dysuria, hematuria, decreased urinary stream, erectile dysfunction, urethral discharge  M: NEGATIVE for significant arthralgias or myalgia  N: NEGATIVE for weakness, dizziness or paresthesias  P: NEGATIVE for changes in mood or affect    OBJECTIVE:   /82 (BP Location: Right arm, Patient Position: Sitting, Cuff Size: Adult Regular)  Pulse 61  Temp 97.9  F (36.6  C) (Temporal)  Resp 14  Ht 5' 9\" (1.753 m)  Wt 182 lb (82.6 kg)  SpO2 96%  BMI 26.88 kg/m2    EXAM:  GENERAL: healthy, alert and no distress  NECK: no adenopathy, no asymmetry, masses, or scars and thyroid normal to palpation  RESP: lungs clear to auscultation - no rales, rhonchi or wheezes  CV: regular rate and rhythm, normal S1 S2, no S3 or S4, no murmur, click or rub, no peripheral edema and peripheral pulses strong  ABDOMEN: soft, nontender, no hepatosplenomegaly, no masses and bowel sounds normal  MS: no gross musculoskeletal defects noted, no edema    ASSESSMENT/PLAN:       ICD-10-CM    1. Routine general medical examination at a health care facility Z00.00    2. SABINA (generalized anxiety disorder) F41.1 FLUoxetine (PROZAC) 20 MG capsule   3. Need for vaccination Z23 TDAP VACCINE (ADACEL) [50766.002]   4. Need for prophylactic vaccination and inoculation against influenza Z23 1st  Administration  [95978]     FLU VAC, SPLIT VIRUS IM > 3 YO (QUADRIVALENT) [43191]     Routine physical. Vaccinations up-to-date. No current cancer screening recommendations. Anxiety worsening. We'll increase Prozac to 40 mg daily. Seen back in one month. Strongly consider counseling    Increase to 40 daily      COUNSELING:   Reviewed preventive health counseling, as reflected in patient instructions         reports that he has never smoked. He has never used smokeless tobacco.    Estimated body mass index is 26.88 kg/(m^2) as calculated from the following:    Height as of this encounter: 5' 9\" (1.753 m).    Weight as of this encounter: 182 lb " (82.6 kg).       Counseling Resources:  ATP IV Guidelines  Pooled Cohorts Equation Calculator  FRAX Risk Assessment  ICSI Preventive Guidelines  Dietary Guidelines for Americans, 2010  USDA's MyPlate  ASA Prophylaxis  Lung CA Screening    Anurag Hendrickson MD  [facility] for HPI/ROS submitted by the patient on 10/17/2017   PHQ-2 Score: 0

## 2017-10-17 NOTE — PROGRESS NOTES
Injectable Influenza Immunization Documentation    1.  Is the person to be vaccinated sick today?   No    2. Does the person to be vaccinated have an allergy to a component   of the vaccine?   No    3. Has the person to be vaccinated ever had a serious reaction   to influenza vaccine in the past?   No    4. Has the person to be vaccinated ever had Guillain-Barré syndrome?   No    Form completed by Elise Mora MA 10/17/2017

## 2017-10-17 NOTE — MR AVS SNAPSHOT
After Visit Summary   10/17/2017    Fran Staley    MRN: [id number]           Patient Information     Date Of Birth          1995        Visit Information        Provider Department      10/17/2017 2:00 PM Anurag Hendrickson MD [facility]        Today's Diagnoses     Routine general medical examination at a health care facility    -  1    SABINA (generalized anxiety disorder)        Need for vaccination        Need for prophylactic vaccination and inoculation against influenza           Follow-ups after your visit        Your next 10 appointments already scheduled     Dec 15, 2017  9:00 AM CST   SHORT with Anurag Hendrickson MD   [facility]    [address] [place] [id number] [phone]              Who to contact     If you have questions or need follow up information about today's clinic visit or your schedule please contact [facility] directly at [phone].  Normal or non-critical lab and imaging results will be communicated to you by MyChart, letter or phone within 4 business days after the clinic has received the results. If you do not hear from us within 7 days, please contact the clinic through [employer] or phone. If you have a critical or abnormal lab result, we will notify you by phone as soon as possible.  Submit refill requests through [employer] or call your pharmacy and they will forward the refill request to us. Please allow 3 business days for your refill to be completed.          Additional Information About Your Visit        MyChart Information     [employer] gives you secure access to your electronic health record. If you see a primary care provider, you can also send messages to your care team and make appointments. If you have questions, please call your primary care clinic.  If you do not have a primary care provider, please call [phone] and they will assist you.        Care  "EveryWhere ID     This is your Care EveryWhere ID. This could be used by other organizations to access your [facility] medical records  [id number]        Your Vitals Were     Pulse Temperature Respirations Height Pulse Oximetry BMI (Body Mass Index)    61 97.9  F (36.6  C) (Temporal) 14 5' 9\" (1.753 m) 96% 26.88 kg/m2       Blood Pressure from Last 3 Encounters:   10/17/17 122/82   08/16/17 130/86   03/26/17 136/75    Weight from Last 3 Encounters:   10/17/17 182 lb (82.6 kg)   08/16/17 178 lb 4.8 oz (80.9 kg)   03/26/17 170 lb (77.1 kg)              We Performed the Following     1st  Administration  [23466]     FLU VAC, SPLIT VIRUS IM > 3 YO (QUADRIVALENT) [78761]     TDAP VACCINE (ADACEL) [46185.002]          Today's Medication Changes          These changes are accurate as of: 10/17/17  3:55 PM.  If you have any questions, ask your nurse or doctor.               These medicines have changed or have updated prescriptions.        Dose/Directions    FLUoxetine 20 MG capsule   Commonly known as:  PROzac   This may have changed:  how much to take   Used for:  SABINA (generalized anxiety disorder)   Changed by:  Anurag Hendrickson MD        Dose:  40 mg   Take 2 capsules (40 mg) by mouth daily   Quantity:  30 capsule   Refills:  3                Primary Care Provider Office Phone # Fax #    Meche Montalvo PA-C [phone]       [address] [place], [place] [id number]        Equal Access to Services     PAULINE ABBASI AH: Hadii gray marcum Solachelle, waaxda luqadaha, qaybta kaalmavinod gerber. So [facility] [phone].    ATENCIÓN: Si habla español, tiene a reed disposición servicios gratuitos de asistencia lingüística. Llame al [phone].    We comply with applicable federal civil rights laws and [place] laws. We do not discriminate on the basis of race, color, national origin, age, disability, sex, sexual orientation, or gender identity.            Thank you! "     Thank you for choosing [facility]  for your care. Our goal is always to provide you with excellent care. Hearing back from our patients is one way we can continue to improve our services. Please take a few minutes to complete the written survey that you may receive in the mail after your visit with us. Thank you!             Your Updated Medication List - Protect others around you: Learn how to safely use, store and throw away your medicines at www.disposemymeds.org.          This list is accurate as of: 10/17/17  3:55 PM.  Always use your most recent med list.                   Brand Name Dispense Instructions for use Diagnosis    FLUoxetine 20 MG capsule    PROzac    30 capsule    Take 2 capsules (40 mg) by mouth daily    SABINA (generalized anxiety disorder)

## 2017-10-26 DIAGNOSIS — F41.1 GAD (GENERALIZED ANXIETY DISORDER): ICD-10-CM

## 2017-10-26 NOTE — TELEPHONE ENCOUNTER
Pt is in need of a refill on fluoxetine, the dose increase was put into the chart historically.    Thanks,    David Fischer, Pharm D  [facility]  [phone]

## 2017-10-27 NOTE — TELEPHONE ENCOUNTER
Prozac  Routing refill request to provider for review/approval because:  Medication is reported/historical    David Orozco RN, BSN

## 2017-12-01 ENCOUNTER — APPOINTMENT (OUTPATIENT)
Dept: CT IMAGING | Facility: CLINIC | Age: 22
End: 2017-12-01
Attending: EMERGENCY MEDICINE
Payer: COMMERCIAL

## 2017-12-01 ENCOUNTER — HOSPITAL ENCOUNTER (EMERGENCY)
Facility: CLINIC | Age: 22
Discharge: HOME | End: 2017-12-01
Attending: EMERGENCY MEDICINE | Admitting: EMERGENCY MEDICINE
Payer: COMMERCIAL

## 2017-12-01 VITALS
TEMPERATURE: 98.7 F | BODY MASS INDEX: 26.89 KG/M2 | SYSTOLIC BLOOD PRESSURE: 126 MMHG | DIASTOLIC BLOOD PRESSURE: 67 MMHG | WEIGHT: 182.1 LBS | RESPIRATION RATE: 18 BRPM | OXYGEN SATURATION: 97 % | HEART RATE: 80 BPM

## 2017-12-01 DIAGNOSIS — K08.89 PAIN, DENTAL: ICD-10-CM

## 2017-12-01 DIAGNOSIS — R22.0 RIGHT FACIAL SWELLING: ICD-10-CM

## 2017-12-01 PROCEDURE — 25000128 H RX IP 250 OP 636: Performed by: EMERGENCY MEDICINE

## 2017-12-01 PROCEDURE — 99285 EMERGENCY DEPT VISIT HI MDM: CPT | Mod: 25 | Performed by: EMERGENCY MEDICINE

## 2017-12-01 PROCEDURE — 25000125 ZZHC RX 250: Performed by: EMERGENCY MEDICINE

## 2017-12-01 PROCEDURE — 96375 TX/PRO/DX INJ NEW DRUG ADDON: CPT | Mod: 59 | Performed by: EMERGENCY MEDICINE

## 2017-12-01 PROCEDURE — 96365 THER/PROPH/DIAG IV INF INIT: CPT | Mod: 59 | Performed by: EMERGENCY MEDICINE

## 2017-12-01 PROCEDURE — 70487 CT MAXILLOFACIAL W/DYE: CPT

## 2017-12-01 PROCEDURE — S0077 INJECTION, CLINDAMYCIN PHOSP: HCPCS | Performed by: EMERGENCY MEDICINE

## 2017-12-01 PROCEDURE — 99285 EMERGENCY DEPT VISIT HI MDM: CPT | Mod: Z6 | Performed by: EMERGENCY MEDICINE

## 2017-12-01 RX ORDER — IOPAMIDOL 755 MG/ML
80 INJECTION, SOLUTION INTRAVASCULAR ONCE
Status: COMPLETED | OUTPATIENT
Start: 2017-12-01 | End: 2017-12-01

## 2017-12-01 RX ORDER — HYDROCODONE BITARTRATE AND ACETAMINOPHEN 5; 325 MG/1; MG/1
TABLET ORAL
Refills: 0 | COMMUNITY
Start: 2017-11-28 | End: 2018-03-12

## 2017-12-01 RX ORDER — FLUOXETINE 40 MG/1
CAPSULE ORAL
Refills: 3 | COMMUNITY
Start: 2017-11-20 | End: 2018-03-12

## 2017-12-01 RX ORDER — HYDROMORPHONE HYDROCHLORIDE 2 MG/1
2 TABLET ORAL EVERY 4 HOURS PRN
Qty: 12 TABLET | Refills: 0 | Status: SHIPPED | OUTPATIENT
Start: 2017-12-01 | End: 2018-03-12

## 2017-12-01 RX ORDER — HYDROMORPHONE HYDROCHLORIDE 1 MG/ML
0.5 INJECTION, SOLUTION INTRAMUSCULAR; INTRAVENOUS; SUBCUTANEOUS ONCE
Status: COMPLETED | OUTPATIENT
Start: 2017-12-01 | End: 2017-12-01

## 2017-12-01 RX ORDER — CLINDAMYCIN PHOSPHATE 900 MG/50ML
900 INJECTION, SOLUTION INTRAVENOUS ONCE
Status: COMPLETED | OUTPATIENT
Start: 2017-12-01 | End: 2017-12-01

## 2017-12-01 RX ORDER — CLINDAMYCIN HCL 150 MG
450 CAPSULE ORAL 3 TIMES DAILY
Qty: 63 CAPSULE | Refills: 0 | Status: SHIPPED | OUTPATIENT
Start: 2017-12-01 | End: 2017-12-08

## 2017-12-01 RX ADMIN — HYDROMORPHONE HYDROCHLORIDE 0.5 MG: 1 INJECTION, SOLUTION INTRAMUSCULAR; INTRAVENOUS; SUBCUTANEOUS at 06:33

## 2017-12-01 RX ADMIN — CLINDAMYCIN PHOSPHATE 900 MG: 18 INJECTION, SOLUTION INTRAVENOUS at 07:00

## 2017-12-01 RX ADMIN — SODIUM CHLORIDE 80 ML: 9 INJECTION, SOLUTION INTRAVENOUS at 06:46

## 2017-12-01 RX ADMIN — IOPAMIDOL 80 ML: 755 INJECTION, SOLUTION INTRAVENOUS at 06:47

## 2017-12-01 NOTE — DISCHARGE INSTRUCTIONS
Return if symptoms worsen or new symptoms develop.  Follow-up with dentist as soon as possible.  Take antibiotics as directed.  Pain medication as directed if increased pain fevers increased facial swelling or other symptoms present please return for further evaluation and care.  Rinse mouth with salt water cleaned socket areas out as directed per dentist.

## 2017-12-01 NOTE — ED AVS SNAPSHOT
[facility]    [address] [place] [address]    Phone:  [phone]    Fax:  [phone]                                       Fran Staley   MRN: [id number]    Department:  [facility]   Date of Visit:  12/1/2017           Patient Information     Date Of Birth          1995        Your diagnoses for this visit were:     Pain, dental post op    Right facial swelling        You were seen by Rodrigo Peñaloza MD.      Follow-up Information     Follow up with Meche Montalvo PA-C.    Specialty:  Physician Assistant    Why:  As needed    Contact information:    [address] [place] [address]  [phone]          Follow up with [facility].    Specialty:  EMERGENCY MEDICINE    Why:  If symptoms worsen    Contact information:    [address] [place] [phone]    Additional information:    The medical center is located at   [address] [place] [address] (between [facility] and   [address], [place] miles north   of [place]).        Follow up with Meche Montalvo PA-C.    Specialty:  Physician Assistant    Why:  Next week for recheck.    Contact information:    [address] [place] [address]  [phone]          Follow up with [facility].    Specialty:  EMERGENCY MEDICINE    Why:  If symptoms worsen    Contact information:    [address] [place] [phone]    Additional information:    The medical center is located at   [address] [place] [address] ([address]  in [place], [place] miles north   of [place]).        Discharge Instructions       Return if symptoms worsen or new symptoms develop.  Follow-up with dentist as soon as possible.  Take antibiotics as directed.  Pain medication as directed if increased pain fevers increased facial swelling or other symptoms present please return for further  evaluation and care.  Rinse mouth with salt water cleaned socket areas out as directed per dentist.    Discharge References/Attachments     DENTAL PAIN (ENGLISH)    TOOTH ABSCESS (ENGLISH)      Future Appointments        Provider Department Dept Phone Center    12/4/2017 11:00 AM Sameer Duckworth DO [facility] [phone] [facility]    12/15/2017 9:00 AM Anurag Hendrickson MD [facility] [phone] [facility]      24 Hour Appointment Hotline       To make an appointment at any [facility], call [phone] ([phone]). If you don't have a family doctor or clinic, we will help you find one. [facility] are conveniently located to serve the needs of you and your family.             Review of your medicines      START taking        Dose / Directions Last dose taken    clindamycin 150 MG capsule   Commonly known as:  CLEOCIN   Dose:  450 mg   Quantity:  63 capsule        Take 3 capsules (450 mg) by mouth 3 times daily for 7 days   Refills:  0        HYDROmorphone 2 MG tablet   Commonly known as:  DILAUDID   Dose:  2 mg   Quantity:  12 tablet        Take 1 tablet (2 mg) by mouth every 4 hours as needed   Refills:  0          Our records show that you are taking the medicines listed below. If these are incorrect, please call your family doctor or clinic.        Dose / Directions Last dose taken    * FLUoxetine 20 MG capsule   Commonly known as:  PROzac   Dose:  40 mg   Quantity:  60 capsule        Take 2 capsules (40 mg) by mouth daily   Refills:  3        * FLUoxetine 40 MG capsule   Commonly known as:  PROzac        TAKE ONE CAPSULE BY MOUTH ONCE DAILY   Refills:  3        HYDROcodone-acetaminophen 5-325 MG per tablet   Commonly known as:  NORCO        TAKE ONE TABLET BY MOUTH EVERY 6 HOURS AS NEEDED FOR PAIN DO NOT EXCEED 4000MG ACETAMINOPHEN/DAY *CAUTION: OPIOID. RISK OF OVERDOSE AND COLEEN   Refills:  0        * Notice:  This list has 2  medication(s) that are the same as other medications prescribed for you. Read the directions carefully, and ask your doctor or other care provider to review them with you.            Prescriptions were sent or printed at these locations (2 Prescriptions)                   Other Prescriptions                Printed at Department/Unit printer (2 of 2)         clindamycin (CLEOCIN) 150 MG capsule               HYDROmorphone (DILAUDID) 2 MG tablet                Procedures and tests performed during your visit     CT Maxillofacial w Contrast      Orders Needing Specimen Collection     None      Pending Results     Date and Time Order Name Status Description    12/1/2017 0621 CT Maxillofacial w Contrast Preliminary             Pending Culture Results     No orders found from 11/29/2017 to 12/2/2017.            Pending Results Instructions     If you had any lab results that were not finalized at the time of your Discharge, you can call the ED Lab Result RN at [phone]. You will be contacted by this team for any positive Lab results or changes in treatment. The nurses are available 7 days a week from 10A to 6:30P.  You can leave a message 24 hours per day and they will return your call.        Test Results From Your Hospital Stay        12/1/2017  7:49 AM      Narrative     CT SCAN OF THE FACE WITH CONTRAST 12/1/2017 6:55 AM     HISTORY: Right-sided facial swelling. Rule out abscess.     TECHNIQUE: Axial scans of the face following intravenous contrast with  sagittal and coronal reformations. Radiation dose for this scan was  reduced using automated exposure control, adjustment of the mA and/or  kV according to patient size, or iterative reconstruction technique.  80 mL Isovue-370    COMPARISON: None.    FINDINGS: The right third mandibular and maxillary molars are absent  as is the left third maxillary molar with soft tissue in the teeth  sockets. There are small foci of air within the right third maxillary  tooth  socket and apparent dehiscence of the overlying alveolar cortex  with soft tissue extension into the right maxillary sinus. There is  asymmetric polypoid mucosal thickening in the right maxillary sinus  inferiorly that could be due to odontogenic sinusitis. There is also a  small foci of air within the left maxillary tooth socket without  obvious dehiscence of the overlying alveolar cortex.    There is a small hypodense pocket containing foci of air within the  right third maxillary tooth socket. No well-formed loculated abscess  is identified in this area. This could be postoperative in nature  although developing abscess cannot be excluded. There is subcutaneous  reticulation and stranding of the overlying soft tissues of the right  face.    Aside from the right maxillary sinus, there is scattered mucosal  thickening within the ethmoid air cells, left maxillary sinus, and  left sphenoid sinus.    Submandibular and parotid glands are unremarkable.    No suspicious lymph nodes within the visualized neck.    No suspicious osseous lesions.        Impression     IMPRESSION:  1. Postoperative changes following recent removal of right third  mandibular and maxillary molars as well as the left third maxillary  molar.  2. Subcutaneous reticulation and stranding of the right face. No  definite loculated abscess is appreciated although there is a  hypodense collection containing foci of air within the right third  maxillary tooth socket without significant surrounding enhancement.  This could represent postoperative change although early developing  abscess cannot be excluded.  3. Asymmetric polypoid mucosal thickening within the right maxillary  sinus inferiorly with possible dehiscence of the alveolar cortex  overlying the now absent right maxillary molar. This could have  represented odontogenic sinusitis.                    Thank you for choosing [facility]       Thank you for choosing [facility] for your care. Our goal is  always to provide you with excellent care. Hearing back from our patients is one way we can continue to improve our services. Please take a few minutes to complete the written survey that you may receive in the mail after you visit with us. Thank you!        [employer] Information     [employer] gives you secure access to your electronic health record. If you see a primary care provider, you can also send messages to your care team and make appointments. If you have questions, please call your primary care clinic.  If you do not have a primary care provider, please call [phone] and they will assist you.        Care EveryWhere ID     This is your Care EveryWhere ID. This could be used by other organizations to access your [facility] medical records  [id number]        Equal Access to Services     VAMSHI ABBASI : Chris Page, ayesha weems, rodger ramírez, vinod forman. So [facility] [phone].    ATENCIÓN: Si habla español, tiene a reed disposición servicios gratuitos de asistencia lingüística. Llame al [phone].    We comply with applicable federal civil rights laws and [place] laws. We do not discriminate on the basis of race, color, national origin, age, disability, sex, sexual orientation, or gender identity.            After Visit Summary       This is your record. Keep this with you and show to your community pharmacist(s) and doctor(s) at your next visit.

## 2017-12-01 NOTE — ED AVS SNAPSHOT
[facility]    [address] [facility] [address]    Phone:  [phone]    Fax:  [phone]                                       Fran Staley   MRN: [id number]    Department:  [facility]   Date of Visit:  12/1/2017           After Visit Summary Signature Page     I have received my discharge instructions, and my questions have been answered. I have discussed any challenges I see with this plan with the nurse or doctor.    ..........................................................................................................................................  Patient/Patient Representative Signature      ..........................................................................................................................................  Patient Representative Print Name and Relationship to Patient    ..................................................               ................................................  Date                                            Time    ..........................................................................................................................................  Reviewed by Signature/Title    ...................................................              ..............................................  Date                                                            Time

## 2017-12-01 NOTE — ED PROVIDER NOTES
History     Chief Complaint   Patient presents with     Dental Problem     HPI  Fran Staley is a 22 year old male who had his wisdom teeth removed on Tuesday presents emergency department complaining of significant pain and swelling of the right side of his face and jaw.  Patient states symptoms began the day after surgery called his dentist on Wednesday but was told to give it some time.  Pain increased and he finished up his hydrocodone yesterday and contacted his dentist who stated they were out of the office until Monday.  Patient rates his pain a 8 out of 10 and worse with movement of the jaw.  He has moderate facial swelling.  He is able swallow without difficulty.  States he is having a headache.  Denies any fevers or chills has not had any difficulty swallowing or difficulty breathing.    Problem List:    Patient Active Problem List    Diagnosis Date Noted     SABINA (generalized anxiety disorder) 10/17/2017     Priority: Medium     Bilateral thoracic back pain 07/26/2017     Priority: Medium     Drug allergy 03/20/2012     Priority: Medium     March 20, 2012 hives from amoxicillin              Past Medical History:    No past medical history on file.    Past Surgical History:    Past Surgical History:   Procedure Laterality Date     none         Family History:    Family History   Problem Relation Age of Onset     DIABETES Maternal Grandfather      HEART DISEASE Maternal Grandfather      Myocardial Infarction Maternal Uncle        Social History:  Marital Status:  Single [1]  Social History   Substance Use Topics     Smoking status: Never Smoker     Smokeless tobacco: Never Used     Alcohol use Yes      Comment: once a week        Medications:      FLUoxetine (PROZAC) 40 MG capsule   HYDROcodone-acetaminophen (NORCO) 5-325 MG per tablet   FLUoxetine (PROZAC) 20 MG capsule         Review of Systems   Constitutional: Positive for appetite change. Negative for chills and fever.   HENT: Positive for dental  problem. Negative for congestion, sore throat and trouble swallowing.    Eyes: Negative for pain.   Respiratory: Negative for cough, chest tightness and shortness of breath.    Cardiovascular: Negative for chest pain.   Gastrointestinal: Negative for abdominal pain, nausea and vomiting.   Musculoskeletal: Negative for back pain and myalgias.   Skin: Negative for rash.   Neurological: Positive for headaches. Negative for weakness and numbness.   Psychiatric/Behavioral: Negative for confusion.       Physical Exam   BP: 147/86  Pulse: 80  Temp: 98.7  F (37.1  C)  Resp: 18  Weight: 82.6 kg (182 lb 1.6 oz)  SpO2: 99 %      Physical Exam   Constitutional: He appears well-developed and well-nourished. No distress.   HENT:   Head: Normocephalic.   Moderate swelling noted to the R jaw/buccal mucosa. Post op changes noted to the posterior molar regions B with swelling of gingiva in the upper and lower R region. No active drainage no obvious abscess is palpable. Posterior pharynx without erythema/edema.    Eyes: Conjunctivae are normal.   Neck: Normal range of motion. Neck supple.   Pulmonary/Chest: Effort normal.   Neurological: He is alert. He exhibits normal muscle tone.   Skin: Skin is warm and dry. No rash noted.   Psychiatric: He has a normal mood and affect.   Nursing note and vitals reviewed.      ED Course     ED Course     Procedures               Critical Care time:  none               Results for orders placed or performed during the hospital encounter of 12/01/17   CT Maxillofacial w Contrast    Narrative    CT SCAN OF THE FACE WITH CONTRAST 12/1/2017 6:55 AM     HISTORY: Right-sided facial swelling. Rule out abscess.     TECHNIQUE: Axial scans of the face following intravenous contrast with  sagittal and coronal reformations. Radiation dose for this scan was  reduced using automated exposure control, adjustment of the mA and/or  kV according to patient size, or iterative reconstruction technique.  80 mL  Isovue-[id number]    COMPARISON: None.    FINDINGS: The right third mandibular and maxillary molars are absent  as is the left third maxillary molar with soft tissue in the teeth  sockets. There are small foci of air within the right third maxillary  tooth socket and apparent dehiscence of the overlying alveolar cortex  with soft tissue extension into the right maxillary sinus. There is  asymmetric polypoid mucosal thickening in the right maxillary sinus  inferiorly that could be due to odontogenic sinusitis. There is also a  small foci of air within the left maxillary tooth socket without  obvious dehiscence of the overlying alveolar cortex.    There is a small hypodense pocket containing foci of air within the  right third maxillary tooth socket. No well-formed loculated abscess  is identified in this area. This could be postoperative in nature  although developing abscess cannot be excluded. There is subcutaneous  reticulation and stranding of the overlying soft tissues of the right  face.    Aside from the right maxillary sinus, there is scattered mucosal  thickening within the ethmoid air cells, left maxillary sinus, and  left sphenoid sinus.    Submandibular and parotid glands are unremarkable.    No suspicious lymph nodes within the visualized neck.    No suspicious osseous lesions.      Impression    IMPRESSION:  1. Postoperative changes following recent removal of right third  mandibular and maxillary molars as well as the left third maxillary  molar.  2. Subcutaneous reticulation and stranding of the right face. No  definite loculated abscess is appreciated although there is a  hypodense collection containing foci of air within the right third  maxillary tooth socket without significant surrounding enhancement.  This could represent postoperative change although early developing  abscess cannot be excluded.  3. Asymmetric polypoid mucosal thickening within the right maxillary  sinus inferiorly with possible  dehiscence of the alveolar cortex  overlying the now absent right maxillary molar. This could have  represented odontogenic sinusitis.      SHERRIE ESPINOZA MD         Assessments & Plan (with Medical Decision Making)records were reviewed. CT scan of face was obtained to r/o an abscess. Patient was given dilaudid for pain. Patient was covered with clindamycin iv. CT scan without evidence of abscess . Findings discussed with the patient. He will follow up with his dentist and continue oral antibiotics . He should return if symptoms worsen or new symptoms develop.      I have reviewed the nursing notes.    I have reviewed the findings, diagnosis, plan and need for follow up with the patient.       Discharge Medication List as of 12/1/2017  8:25 AM      START taking these medications    Details   clindamycin (CLEOCIN) 150 MG capsule Take 3 capsules (450 mg) by mouth 3 times daily for 7 days, Disp-63 capsule, R-0, Local Print      HYDROmorphone (DILAUDID) 2 MG tablet Take 1 tablet (2 mg) by mouth every 4 hours as needed, Disp-12 tablet, R-0, Local Print             Final diagnoses:   Pain, dental - post op   Right facial swelling       12/1/2017   [facility] EMERGENCY DEPARTMENT     Rodrigo Peñaloza MD  12/03/17 1938

## 2017-12-01 NOTE — ED NOTES
[place] teeth out on Tuesday called dentist on Wednesday due to increasing pain and swelling was advised to give it a little time yesterday significant increase inboth pain and swelling on R side has been taking Vicodin and ibuprofen regularly called dentist office was advised she wouldn't be in until Monday   Pt presents with flushed cheeks   Pain and swelling on R

## 2017-12-03 ASSESSMENT — ENCOUNTER SYMPTOMS
TROUBLE SWALLOWING: 0
EYE PAIN: 0
CONFUSION: 0
MYALGIAS: 0
APPETITE CHANGE: 1
HEADACHES: 1
SORE THROAT: 0
FEVER: 0
NAUSEA: 0
SHORTNESS OF BREATH: 0
NUMBNESS: 0
COUGH: 0
CHILLS: 0
WEAKNESS: 0
VOMITING: 0
BACK PAIN: 0
CHEST TIGHTNESS: 0
ABDOMINAL PAIN: 0

## 2017-12-05 ENCOUNTER — OFFICE VISIT (OUTPATIENT)
Dept: ORTHOPEDICS | Facility: CLINIC | Age: 22
End: 2017-12-05
Payer: COMMERCIAL

## 2017-12-05 VITALS
SYSTOLIC BLOOD PRESSURE: 122 MMHG | BODY MASS INDEX: 26.96 KG/M2 | WEIGHT: 182 LBS | DIASTOLIC BLOOD PRESSURE: 72 MMHG | HEIGHT: 69 IN

## 2017-12-05 DIAGNOSIS — S63.601D SPRAIN OF RIGHT THUMB, SUBSEQUENT ENCOUNTER: ICD-10-CM

## 2017-12-05 DIAGNOSIS — S69.91XD INJURY OF RIGHT THUMB, SUBSEQUENT ENCOUNTER: Primary | ICD-10-CM

## 2017-12-05 PROCEDURE — 99214 OFFICE O/P EST MOD 30 MIN: CPT | Performed by: PEDIATRICS

## 2017-12-05 NOTE — PROGRESS NOTES
"Sports Medicine Clinic Visit    PCP: Meche Montalvo    Fran Staley is a 22 year old male who is seen in f/u up for Injury of right thumb, subsequent encounter. Since last visit on 8/1/2016 patient has continued to have pain in his right thumb. He states he wore the splint for 6 weeks but never had improvement in his thumb pain.  Feels worse with activities.   No new injury    **  Previous injury, patient hyperextended thumb catching a canoe.  Currently, has aching pain in the right thumb: diffuse through the thumb.  Swelling occurs after activity.  No bruising.      Review of Systems  All other systems reviewed and are negative unless noted above.    This document serves as a record of the services and decisions personally performed and made by Sameer Duckworth DO, CAQ. It was created on his behalf by Tarik Redman, a trained medical scribe. The creation of this document is based the provider's statements to the medical scribe.  Tarik Redman December 5, 2017 9:16 AM       No past medical history on file.  Past Surgical History:   Procedure Laterality Date     none         Objective  /72  Ht 5' 9\" (1.753 m)  Wt 182 lb (82.6 kg)  BMI 26.88 kg/m2    GENERAL APPEARANCE: healthy, alert and no distress   GAIT: NORMAL  SKIN: no suspicious lesions or rashes  NEURO: Normal strength and tone, mentation intact and speech normal  PSYCH:  mentation appears normal and affect normal/bright  HEENT: no scleral icterus  CV: no extremity edema   RESP: nonlabored breathing     Exam  Hand/wrist (right):    Inspection:  No deformity noted. Mild swelling around the MCP joint of the thumb. No ecchymosis      Motion:  Forearm pronation grossly full , forearm supination grossly full .  Wrist flexion grossly full   Wrist extension grossly full   Wrist radial deviation grossly full   Wrist ulnar deviation grossly full   Digit motion mild limitation with active flexion of the thumb at MCP joint, stiff, mild pain; " "minimal limitation extension of the thumb, grossly symmetric passive motion   Abduction, adduction full, symmetric    Sensation:  Grossly intact    Radial pulses normal, +2/4, capillary refill brisk.    Palpation:  Tender at volar, less at ulnar aspect, of base of thumb--MCP joint  Nontender remainder of the hand     Special Tests:       Stability testing of the MCP joint grossly symmetric , no clear laxity of UCL on left, minimal pain with testing    Radiology  Visualized MRI of right thumb from 8/5/2016, and reviewed the images with the patient.  Impression: distal UCL sprain. Note in report below, it should read \"right.\"      MR LEFT FINGER WITHOUT CONTRAST  8/5/2016 8:14 AM     HISTORY:  Unspecified injury of right wrist, hand and finger(s),  subsequent encounter.     COMPARISON: None.     TECHNIQUE: Routine multiplanar, multisequence imaging was performed.     FINDINGS:  There is an ulnar collateral ligament tear from its distal  attachment on the proximal phalanx of the thumb. It is not proximally  retracted and remains deep to the adductor aponeurosis, and there is  no Stener lesion.     No evidence of fracture. No significant joint effusion. No tendon  abnormalities.         IMPRESSION: Distal ulnar collateral ligament tear. No significant  proximal retraction. Tear remains deep to the adductor aponeurosis.   No evidence of Stener lesion.     TREY CORDOVA MD    Assessment:  1. Injury of right thumb, subsequent encounter    2. Sprain of right thumb, subsequent encounter        Plan:  Discussed the assessment with the patient.    Pertinent imaging of the area reviewed with the patient.    Discussed:  *Symptom Treatment   *Activity Modification   *Rehab - Hand Therapy  *Imaging - Updated MRI of the left thumb  *Referral to Surgeon - pending MRI results   *Injection - Corticosteroid injection not advised     Topical Treatments: Ice prn   Over the counter medication: Patient's preferred OTC medication as directed " on packaging.  MRI of the left thumb   Activity Modification: as discussed   We discussed potential for rehab, given stiffness; if sprain healed, then I think rehab is best option. If not, then possibly referral to ortho surgeon.  Follow up: call with results of MRI  Questions answered. The patient indicates understanding of these issues and agrees with the plan.    Sameer Duckworth DO, CAQ       Disclaimer: This note consists of symbols derived from keyboarding, dictation and/or voice recognition software. As a result, there may be errors in the script that have gone undetected. Please consider this when interpreting information found in this chart.

## 2017-12-05 NOTE — MR AVS SNAPSHOT
After Visit Summary   12/5/2017    Fran Staley    MRN: [id number]           Patient Information     Date Of Birth          1995        Visit Information        Provider Department      12/5/2017 8:40 AM Sameer Duckworth, DO [facility]        Today's Diagnoses     Injury of right thumb, subsequent encounter    -  1    Sprain of right thumb, subsequent encounter          Care Instructions     Advanced imaging is done by appointment. Some insurance require a prior authorization to be completed which may delay the time until you are able to schedule your appointment.   Please call [place] [place] [facility] [place]: [phone] to schedule your MRI.  Depending on your availability you can usually schedule within the next 1-2 days.    The clinic will call you with results, if you have not heard from the clinic within 3-4 days following your MRI please contact us at the number listed below.       If you have any further questions for your physician or physician s care team you can call [phone] and use option 3 to leave a voice message. Calls received during business hours will be returned same day.                  Follow-ups after your visit        Your next 10 appointments already scheduled     Dec 15, 2017  9:00 AM CST   [facility] with [facility] Hendrickson [facility]   [facility]    [address] [place] [id number] [phone]              Future tests that were ordered for you today     Open Future Orders        Priority Expected Expires Ordered    MR Finger Right w/o Contrast Routine  12/5/2018 12/5/2017            Who to contact     If you have questions or need follow up information about today's clinic visit or your schedule please contact [facility] directly at [phone].  Normal or non-critical lab and imaging results will be communicated to you by [facility]  "letter or phone within 4 business days after the clinic has received the results. If you do not hear from us within 7 days, please contact the clinic through [employer] or phone. If you have a critical or abnormal lab result, we will notify you by phone as soon as possible.  Submit refill requests through [employer] or call your pharmacy and they will forward the refill request to us. Please allow 3 business days for your refill to be completed.          Additional Information About Your Visit        [employer] Information     [employer] gives you secure access to your electronic health record. If you see a primary care provider, you can also send messages to your care team and make appointments. If you have questions, please call your primary care clinic.  If you do not have a primary care provider, please call [phone] and they will assist you.        Care EveryWhere ID     This is your Care EveryWhere ID. This could be used by other organizations to access your [facility] medical records  [id number]        Your Vitals Were     Height BMI (Body Mass Index)                5' 9\" (1.753 m) 26.88 kg/m2           Blood Pressure from Last 3 Encounters:   12/05/17 122/72   12/01/17 126/67   10/17/17 122/82    Weight from Last 3 Encounters:   12/05/17 182 lb (82.6 kg)   12/01/17 182 lb 1.6 oz (82.6 kg)   10/17/17 182 lb (82.6 kg)               Primary Care Provider Office Phone # Fax #    Meche Montalvo PA-C [phone] [address] [place], [place] [id number]        Equal Access to Services     PAULINE ABBASI : Hadii aad ku hadasho Soomaali, waaxda luqadaha, qaybta kaalmada adeegyajaime, vniod marina . So [facility] [phone].    ATENCIÓN: Si habla español, tiene a reed disposición servicios gratuitos de asistencia lingüística. Llame al [phone].    We comply with applicable federal civil rights laws and [place] laws. We do not discriminate on the basis of race, color, national " origin, age, disability, sex, sexual orientation, or gender identity.            Thank you!     Thank you for choosing [facility]  for your care. Our goal is always to provide you with excellent care. Hearing back from our patients is one way we can continue to improve our services. Please take a few minutes to complete the written survey that you may receive in the mail after your visit with us. Thank you!             Your Updated Medication List - Protect others around you: Learn how to safely use, store and throw away your medicines at www.disposemymeds.org.          This list is accurate as of: 12/5/17  9:34 AM.  Always use your most recent med list.                   Brand Name Dispense Instructions for use Diagnosis    clindamycin 150 MG capsule    CLEOCIN    63 capsule    Take 3 capsules (450 mg) by mouth 3 times daily for 7 days        * FLUoxetine 20 MG capsule    PROzac    60 capsule    Take 2 capsules (40 mg) by mouth daily    SABINA (generalized anxiety disorder)       * FLUoxetine 40 MG capsule    PROzac     TAKE ONE CAPSULE BY MOUTH ONCE DAILY        HYDROcodone-acetaminophen 5-325 MG per tablet    NORCO     TAKE ONE TABLET BY MOUTH EVERY 6 HOURS AS NEEDED FOR PAIN DO NOT EXCEED 4000MG ACETAMINOPHEN/DAY *CAUTION: OPIOID. RISK OF OVERDOSE AND COLEEN        HYDROmorphone 2 MG tablet    DILAUDID    12 tablet    Take 1 tablet (2 mg) by mouth every 4 hours as needed        * Notice:  This list has 2 medication(s) that are the same as other medications prescribed for you. Read the directions carefully, and ask your doctor or other care provider to review them with you.

## 2017-12-05 NOTE — LETTER
"    12/5/2017         RE: Fran Staley  [address] [facility] [address]        Dear Colleague,    Thank you for referring your patient, Fran Staley, to the [facility]. Please see a copy of my visit note below.    Sports Medicine Clinic Visit    PCP: Meche Montalvo    Fran Staley is a 22 year old male who is seen in f/u up for Injury of right thumb, subsequent encounter. Since last visit on 8/1/2016 patient has continued to have pain in his right thumb. He states he wore the splint for 6 weeks but never had improvement in his thumb pain.  Feels worse with activities.   No new injury    **  Previous injury, patient hyperextended thumb catching a canoe.  Currently, has aching pain in the right thumb: diffuse through the thumb.  Swelling occurs after activity.  No bruising.      Review of Systems  All other systems reviewed and are negative unless noted above.    This document serves as a record of the services and decisions personally performed and made by Sameer Duckworth DO, CAQ. It was created on his behalf by Tarik Redman, a trained medical scribe. The creation of this document is based the provider's statements to the medical scribe.  Tarik Redman December 5, 2017 9:16 AM       No past medical history on file.  Past Surgical History:   Procedure Laterality Date     none         Objective  /72  Ht 5' 9\" (1.753 m)  Wt 182 lb (82.6 kg)  BMI 26.88 kg/m2    GENERAL APPEARANCE: healthy, alert and no distress   GAIT: NORMAL  SKIN: no suspicious lesions or rashes  NEURO: Normal strength and tone, mentation intact and speech normal  PSYCH:  mentation appears normal and affect normal/bright  HEENT: no scleral icterus  CV: no extremity edema   RESP: nonlabored breathing     Exam  Hand/wrist (right):    Inspection:  No deformity noted. Mild swelling around the MCP joint of the thumb. No ecchymosis      Motion:  Forearm pronation grossly full , forearm " "supination grossly full .  Wrist flexion grossly full   Wrist extension grossly full   Wrist radial deviation grossly full   Wrist ulnar deviation grossly full   Digit motion mild limitation with active flexion of the thumb at MCP joint, stiff, mild pain; minimal limitation extension of the thumb, grossly symmetric passive motion   Abduction, adduction full, symmetric    Sensation:  Grossly intact    Radial pulses normal, +2/4, capillary refill brisk.    Palpation:  Tender at volar, less at ulnar aspect, of base of thumb--MCP joint  Nontender remainder of the hand     Special Tests:       Stability testing of the MCP joint grossly symmetric , no clear laxity of UCL on left, minimal pain with testing    Radiology  Visualized MRI of right thumb from 8/5/2016, and reviewed the images with the patient.  Impression: distal UCL sprain. Note in report below, it should read \"right.\"      MR LEFT FINGER WITHOUT CONTRAST  8/5/2016 8:14 AM     HISTORY:  Unspecified injury of right wrist, hand and finger(s),  subsequent encounter.     COMPARISON: None.     TECHNIQUE: Routine multiplanar, multisequence imaging was performed.     FINDINGS:  There is an ulnar collateral ligament tear from its distal  attachment on the proximal phalanx of the thumb. It is not proximally  retracted and remains deep to the adductor aponeurosis, and there is  no Stener lesion.     No evidence of fracture. No significant joint effusion. No tendon  abnormalities.         IMPRESSION: Distal ulnar collateral ligament tear. No significant  proximal retraction. Tear remains deep to the adductor aponeurosis.   No evidence of Stener lesion.     TREY CORDOVA MD    Assessment:  1. Injury of right thumb, subsequent encounter    2. Sprain of right thumb, subsequent encounter        Plan:  Discussed the assessment with the patient.    Pertinent imaging of the area reviewed with the patient.    Discussed:  *Symptom Treatment   *Activity Modification   *Rehab - Hand " Therapy  *Imaging - Updated MRI of the left thumb  *Referral to Surgeon - pending MRI results   *Injection - Corticosteroid injection not advised     Topical Treatments: Ice prn   Over the counter medication: Patient's preferred OTC medication as directed on packaging.  MRI of the left thumb   Activity Modification: as discussed   We discussed potential for rehab, given stiffness; if sprain healed, then I think rehab is best option. If not, then possibly referral to ortho surgeon.  Follow up: call with results of MRI  Questions answered. The patient indicates understanding of these issues and agrees with the plan.    Sameer Duckworth DO, CAQ       Disclaimer: This note consists of symbols derived from keyboarding, dictation and/or voice recognition software. As a result, there may be errors in the script that have gone undetected. Please consider this when interpreting information found in this chart.        Again, thank you for allowing me to participate in the care of your patient.        Sincerely,        Sameer Duckworth DO

## 2017-12-05 NOTE — PATIENT INSTRUCTIONS
Advanced imaging is done by appointment. Some insurance require a prior authorization to be completed which may delay the time until you are able to schedule your appointment.   Please call [facility]: [phone] to schedule your MRI.  Depending on your availability you can usually schedule within the next 1-2 days.    The clinic will call you with results, if you have not heard from the clinic within 3-4 days following your MRI please contact us at the number listed below.       If you have any further questions for your physician or physician s care team you can call [phone] and use option 3 to leave a voice message. Calls received during business hours will be returned same day.

## 2017-12-06 ENCOUNTER — MYC MEDICAL ADVICE (OUTPATIENT)
Dept: FAMILY MEDICINE | Facility: CLINIC | Age: 22
End: 2017-12-06

## 2017-12-06 ENCOUNTER — HOSPITAL ENCOUNTER (OUTPATIENT)
Dept: MRI IMAGING | Facility: CLINIC | Age: 22
Discharge: HOME | End: 2017-12-06
Attending: PEDIATRICS | Admitting: PEDIATRICS
Payer: COMMERCIAL

## 2017-12-06 DIAGNOSIS — S63.601D SPRAIN OF RIGHT THUMB, SUBSEQUENT ENCOUNTER: ICD-10-CM

## 2017-12-06 PROCEDURE — 73221 MRI JOINT UPR EXTREM W/O DYE: CPT | Mod: RT

## 2017-12-07 ENCOUNTER — TELEPHONE (OUTPATIENT)
Dept: ORTHOPEDICS | Facility: CLINIC | Age: 22
End: 2017-12-07

## 2017-12-07 DIAGNOSIS — S63.601D SPRAIN OF RIGHT THUMB, SUBSEQUENT ENCOUNTER: Primary | ICD-10-CM

## 2017-12-07 NOTE — TELEPHONE ENCOUNTER
Pt KATM, calling for MRI results. Said he realizes it has only been a day since MRI, but would like to know results as soon as he can because he may need to get procedures scheduled before the end of the year for insurance coverage purposes.

## 2017-12-07 NOTE — TELEPHONE ENCOUNTER
Appears UCL sprain healed. Report below. Still with soreness at this joint, with limited motion. Suggest hand therapy next, f/u 4-6 weeks if not improving with therapy. Thanks.  Sameer Duckworth DO, CAQ

## 2017-12-07 NOTE — TELEPHONE ENCOUNTER
MR THUMB RIGHT WITHOUT CONTRAST December 6, 2017 5:08 PM      HISTORY: Evaluate ulnar collateral ligament. Sprain of right thumb,  subsequent encounter.     TECHNIQUE: Coronal oblique T1, gradient echo, and proton density,  sagittal T1, and transverse T1, fat-suppressed T2, and inversion  recovery pulse sequences are submitted.     FINDINGS: There is no acute signal abnormality associated with the  ulnar collateral ligament. There is no apparent tear or ligament  displacement. The relationship of the ulnar collateral ligament to the  adductor aponeurosis appears within normal limits with no evidence of  Stener lesion. The metacarpophalangeal joint appears congruent. No  joint effusion is demonstrated. Marrow signal within the thumb appears  within normal limits. The flexor and extensor tendons appear within  normal limits. Intramuscular signal within the thenar eminence appears  within normal limits.         IMPRESSION: No evidence of acute ulnar collateral ligament tear or  Stener lesion at the right thumb metacarpophalangeal joint. No osseous  or soft tissue signal abnormality is demonstrated within the thumb.     GARLAND OWEN MD

## 2017-12-08 NOTE — TELEPHONE ENCOUNTER
Hand therapy order placed      Patient agrees to plan    Closing encounter. No further action required.

## 2018-03-12 ENCOUNTER — OFFICE VISIT (OUTPATIENT)
Dept: FAMILY MEDICINE | Facility: CLINIC | Age: 23
End: 2018-03-12
Payer: COMMERCIAL

## 2018-03-12 VITALS
SYSTOLIC BLOOD PRESSURE: 139 MMHG | HEIGHT: 69 IN | BODY MASS INDEX: 29.62 KG/M2 | DIASTOLIC BLOOD PRESSURE: 82 MMHG | WEIGHT: 200 LBS | HEART RATE: 62 BPM | TEMPERATURE: 97.7 F

## 2018-03-12 DIAGNOSIS — R03.0 ELEVATED BLOOD PRESSURE READING WITHOUT DIAGNOSIS OF HYPERTENSION: ICD-10-CM

## 2018-03-12 DIAGNOSIS — F41.9 ANXIETY: Primary | ICD-10-CM

## 2018-03-12 DIAGNOSIS — Z71.85 HPV VACCINE COUNSELING: ICD-10-CM

## 2018-03-12 PROCEDURE — 99214 OFFICE O/P EST MOD 30 MIN: CPT | Performed by: PHYSICIAN ASSISTANT

## 2018-03-12 ASSESSMENT — PAIN SCALES - GENERAL: PAINLEVEL: NO PAIN (0)

## 2018-03-12 ASSESSMENT — ANXIETY QUESTIONNAIRES
GAD7 TOTAL SCORE: 12
1. FEELING NERVOUS, ANXIOUS, OR ON EDGE: MORE THAN HALF THE DAYS
6. BECOMING EASILY ANNOYED OR IRRITABLE: SEVERAL DAYS
3. WORRYING TOO MUCH ABOUT DIFFERENT THINGS: MORE THAN HALF THE DAYS
7. FEELING AFRAID AS IF SOMETHING AWFUL MIGHT HAPPEN: NEARLY EVERY DAY
2. NOT BEING ABLE TO STOP OR CONTROL WORRYING: MORE THAN HALF THE DAYS
5. BEING SO RESTLESS THAT IT IS HARD TO SIT STILL: NOT AT ALL

## 2018-03-12 ASSESSMENT — PATIENT HEALTH QUESTIONNAIRE - PHQ9: 5. POOR APPETITE OR OVEREATING: MORE THAN HALF THE DAYS

## 2018-03-12 NOTE — PROGRESS NOTES
SUBJECTIVE:   Fran Staley is a 22 year old male who presents to clinic today for the following health issues:      Anxiety Follow-Up    Status since last visit: Stopped taking Prozac about 1.5 weeks ago and anxiety was doing well, but does feel anxiety is coming back. Does not want to be on Prozac, has gained 20 pounds while being on it, can't sleep at night as well    Other associated symptoms:None    Complicating factors:   Significant life event: No   Current substance abuse: None  Depression symptoms: No  SABINA-7 SCORE 3/12/2018   Total Score 12       SABINA-7      Amount of exercise or physical activity: 4-5 days/week for an average of 30-45 minutes    Problems taking medications regularly: Stopped taking Prozac    Medication side effects: Insomnia, weight gain    Diet: regular (no restrictions)      Lifelong anxiety, a bit better in high school for awhile.  Prozac worked well but had a lot of wt gain and poor sleep - wakes 3-4 times per night.  Currently has new job, working 80 hrs/wk, and has young child - feels counseling not an option at this time.    Discuss HPV vaccine.    Elev BP - drank Red Bull prior to appt.  BPs appear to be 120s to sometimes 140.    Problem list and histories reviewed & adjusted, as indicated.  Additional history: as documented    BP Readings from Last 3 Encounters:   03/12/18 139/82   12/05/17 122/72   12/01/17 126/67    Wt Readings from Last 3 Encounters:   03/12/18 200 lb (90.7 kg)   12/05/17 182 lb (82.6 kg)   12/01/17 182 lb 1.6 oz (82.6 kg)           Reviewed and updated as needed this visit by clinical staff  Tobacco  Allergies  Meds  Problems  Med Hx  Surg Hx  Fam Hx  Soc Hx        Reviewed and updated as needed this visit by Provider  Tobacco  Allergies  Meds  Problems  Med Hx  Surg Hx  Fam Hx  Soc Hx          ROS:  Constitutional, cardiovascular, pulmonary, and psych systems are negative, except as otherwise noted.    OBJECTIVE:     /82 (BP Location:  "Right arm, Patient Position: Chair, Cuff Size: Adult Large)  Pulse 62  Temp 97.7  F (36.5  C) (Tympanic)  Ht 5' 9\" (1.753 m)  Wt 200 lb (90.7 kg)  BMI 29.53 kg/m2  Body mass index is 29.53 kg/(m^2).  GENERAL: healthy, alert and no distress  PSYCH: mentation appears normal, affect normal/bright  ASSESSMENT/PLAN:       ICD-10-CM    1. Anxiety F41.9 sertraline (ZOLOFT) 50 MG tablet   2. HPV vaccine counseling Z71.89    3. Elevated blood pressure reading without diagnosis of hypertension R03.0    he will keep eye on BPs and have awareness of possible Red Bull effect  He will consider gardasil    Patient Instructions   Switch prozac to zoloft  Refilled x 1 yr - if questions, use My Chart or call.   If not doing well, Evisit  Counseling when life slows down        Meche Montalvo PA-C  [facility]    "

## 2018-03-12 NOTE — PATIENT INSTRUCTIONS
Switch prozac to zoloft  Refilled x 1 yr - if questions, use My Chart or call.   If not doing well, Evisit  Counseling when life slows down

## 2018-03-12 NOTE — NURSING NOTE
"Chief Complaint   Patient presents with     Anxiety       Initial /82 (BP Location: Right arm, Patient Position: Chair, Cuff Size: Adult Large)  Pulse 62  Temp 97.7  F (36.5  C) (Tympanic)  Ht 5' 9\" (1.753 m)  Wt 200 lb (90.7 kg)  BMI 29.53 kg/m2 Estimated body mass index is 29.53 kg/(m^2) as calculated from the following:    Height as of this encounter: 5' 9\" (1.753 m).    Weight as of this encounter: 200 lb (90.7 kg).      Health Maintenance that is potentially due pending provider review:  NONE        Is there anyone who you would like to be able to receive your results? No  If yes have patient fill out BRIANNA      "

## 2018-03-12 NOTE — MR AVS SNAPSHOT
"              After Visit Summary   3/12/2018    Fran Staley    MRN: [id number]           Patient Information     Date Of Birth          1995        Visit Information        Provider Department      3/12/2018 2:40 PM Meche Montalvo PA-C [facility]        Today's Diagnoses     Anxiety    -  1      Care Instructions    Switch prozac to zoloft  Refilled x 1 yr - if questions, use My Chart or call.   If not doing well, Evisit  Counseling when life slows down            Follow-ups after your visit        Who to contact     If you have questions or need follow up information about today's clinic visit or your schedule please contact [facility] directly at [phone].  Normal or non-critical lab and imaging results will be communicated to you by [employer], letter or phone within 4 business days after the clinic has received the results. If you do not hear from us within 7 days, please contact the clinic through [employer] or phone. If you have a critical or abnormal lab result, we will notify you by phone as soon as possible.  Submit refill requests through [email or web address] or call your pharmacy and they will forward the refill request to us. Please allow 3 business days for your refill to be completed.          Additional Information About Your Visit        MyChart Information     [email or web address] gives you secure access to your electronic health record. If you see a primary care provider, you can also send messages to your care team and make appointments. If you have questions, please call your primary care clinic.  If you do not have a primary care provider, please call [phone] and they will assist you.        Care EveryWhere ID     This is your Care EveryWhere ID. This could be used by other organizations to access your [facility] medical records  [id number]        Your Vitals Were     Pulse Temperature Height BMI (Body Mass Index)          62 97.7  F (36.5  C) (Tympanic) 5' 9\" " (1.753 m) 29.53 kg/m2         Blood Pressure from Last 3 Encounters:   03/12/18 139/82   12/05/17 122/72   12/01/17 126/67    Weight from Last 3 Encounters:   03/12/18 200 lb (90.7 kg)   12/05/17 182 lb (82.6 kg)   12/01/17 182 lb 1.6 oz (82.6 kg)              Today, you had the following     No orders found for display         Today's Medication Changes          These changes are accurate as of 3/12/18  3:00 PM.  If you have any questions, ask your nurse or doctor.               Start taking these medicines.        Dose/Directions    sertraline 50 MG tablet   Commonly known as:  ZOLOFT   Used for:  Anxiety   Started by:  Meche Montalvo PA-C        Take 1/2 tablet (25 mg) for 1-2 weeks, then increase to 1 tablet orally daily   Quantity:  30 tablet   Refills:  11         Stop taking these medicines if you haven't already. Please contact your care team if you have questions.     HYDROcodone-acetaminophen 5-325 MG per tablet   Commonly known as:  NORCO   Stopped by:  Meche Montalvo PA-C           HYDROmorphone 2 MG tablet   Commonly known as:  DILAUDID   Stopped by:  Meche Montalvo PA-C                Where to get your medicines      These medications were sent to [facility] [place] [address]     Phone:  [phone]     sertraline 50 MG tablet                Primary Care Provider Office Phone # Fax #    Meche Montalvo PA-C [phone]       [address] [place] [address]        Equal Access to Services     VAMSHI ABBASI : Chris Page, ayesha weems, qanickta kavinod fletcher. So [facility] [phone].    ATENCIÓN: Si habla español, tiene a reed disposición servicios gratuitos de asistencia lingüística. Gianluca al [phone].    We comply with applicable federal civil rights laws and [place] laws. We do not discriminate on the  basis of race, color, national origin, age, disability, sex, sexual orientation, or gender identity.            Thank you!     Thank you for choosing [facility]  for your care. Our goal is always to provide you with excellent care. Hearing back from our patients is one way we can continue to improve our services. Please take a few minutes to complete the written survey that you may receive in the mail after your visit with us. Thank you!             Your Updated Medication List - Protect others around you: Learn how to safely use, store and throw away your medicines at www.disposemymeds.org.          This list is accurate as of 3/12/18  3:00 PM.  Always use your most recent med list.                   Brand Name Dispense Instructions for use Diagnosis    * FLUoxetine 20 MG capsule    PROzac    60 capsule    Take 2 capsules (40 mg) by mouth daily    SABINA (generalized anxiety disorder)       * FLUoxetine 40 MG capsule    PROzac     TAKE ONE CAPSULE BY MOUTH ONCE DAILY        sertraline 50 MG tablet    ZOLOFT    30 tablet    Take 1/2 tablet (25 mg) for 1-2 weeks, then increase to 1 tablet orally daily    Anxiety       * Notice:  This list has 2 medication(s) that are the same as other medications prescribed for you. Read the directions carefully, and ask your doctor or other care provider to review them with you.

## 2018-03-13 ASSESSMENT — PATIENT HEALTH QUESTIONNAIRE - PHQ9: SUM OF ALL RESPONSES TO PHQ QUESTIONS 1-9: 4

## 2018-03-13 ASSESSMENT — ANXIETY QUESTIONNAIRES: GAD7 TOTAL SCORE: 12

## 2018-03-19 ENCOUNTER — MYC MEDICAL ADVICE (OUTPATIENT)
Dept: FAMILY MEDICINE | Facility: CLINIC | Age: 23
End: 2018-03-19

## 2018-03-19 DIAGNOSIS — F41.1 GAD (GENERALIZED ANXIETY DISORDER): Primary | ICD-10-CM

## 2018-03-19 RX ORDER — CITALOPRAM HYDROBROMIDE 10 MG/1
10 TABLET ORAL DAILY
Qty: 60 TABLET | Refills: 0 | Status: SHIPPED | OUTPATIENT
Start: 2018-03-19 | End: 2019-01-08

## 2018-04-08 ENCOUNTER — HEALTH MAINTENANCE LETTER (OUTPATIENT)
Age: 23
End: 2018-04-08

## 2018-04-29 ENCOUNTER — HEALTH MAINTENANCE LETTER (OUTPATIENT)
Age: 23
End: 2018-04-29

## 2018-11-29 ENCOUNTER — OFFICE VISIT (OUTPATIENT)
Dept: FAMILY MEDICINE | Facility: CLINIC | Age: 23
End: 2018-11-29
Payer: COMMERCIAL

## 2018-11-29 VITALS
WEIGHT: 199.2 LBS | HEART RATE: 72 BPM | SYSTOLIC BLOOD PRESSURE: 126 MMHG | BODY MASS INDEX: 29.42 KG/M2 | DIASTOLIC BLOOD PRESSURE: 80 MMHG | TEMPERATURE: 97.9 F | OXYGEN SATURATION: 98 % | RESPIRATION RATE: 12 BRPM

## 2018-11-29 DIAGNOSIS — K21.9 GASTROESOPHAGEAL REFLUX DISEASE WITHOUT ESOPHAGITIS: ICD-10-CM

## 2018-11-29 DIAGNOSIS — Z23 NEED FOR PROPHYLACTIC VACCINATION AND INOCULATION AGAINST INFLUENZA: ICD-10-CM

## 2018-11-29 DIAGNOSIS — F41.1 GAD (GENERALIZED ANXIETY DISORDER): Primary | ICD-10-CM

## 2018-11-29 PROCEDURE — 99214 OFFICE O/P EST MOD 30 MIN: CPT | Mod: 25 | Performed by: FAMILY MEDICINE

## 2018-11-29 PROCEDURE — 90473 IMMUNE ADMIN ORAL/NASAL: CPT | Performed by: FAMILY MEDICINE

## 2018-11-29 PROCEDURE — 90672 LAIV4 VACCINE INTRANASAL: CPT | Performed by: FAMILY MEDICINE

## 2018-11-29 RX ORDER — VENLAFAXINE HYDROCHLORIDE 37.5 MG/1
CAPSULE, EXTENDED RELEASE ORAL
Qty: 46 CAPSULE | Refills: 1 | Status: SHIPPED | OUTPATIENT
Start: 2018-11-29 | End: 2019-01-08

## 2018-11-29 ASSESSMENT — PATIENT HEALTH QUESTIONNAIRE - PHQ9
SUM OF ALL RESPONSES TO PHQ QUESTIONS 1-9: 2
SUM OF ALL RESPONSES TO PHQ QUESTIONS 1-9: 2
10. IF YOU CHECKED OFF ANY PROBLEMS, HOW DIFFICULT HAVE THESE PROBLEMS MADE IT FOR YOU TO DO YOUR WORK, TAKE CARE OF THINGS AT HOME, OR GET ALONG WITH OTHER PEOPLE: NOT DIFFICULT AT ALL

## 2018-11-29 ASSESSMENT — PAIN SCALES - GENERAL: PAINLEVEL: NO PAIN (0)

## 2018-11-29 NOTE — NURSING NOTE
"Chief Complaint   Patient presents with     Anxiety     Heartburn     lots of heart burn       Initial BP (!) 128/94  Pulse 72  Temp 97.9  F (36.6  C) (Tympanic)  Resp 12  Wt 199 lb 3.2 oz (90.4 kg)  SpO2 98%  BMI 29.42 kg/m2 Estimated body mass index is 29.42 kg/(m^2) as calculated from the following:    Height as of 3/12/18: 5' 9\" (1.753 m).    Weight as of this encounter: 199 lb 3.2 oz (90.4 kg).    Patient presents to the clinic using No DME    Health Maintenance that is potentially due pending provider review:  Flu mist    Possibly completing today per provider review.    Is there anyone who you would like to be able to receive your results? No  If yes have patient fill out BRIANNA      "

## 2018-11-29 NOTE — MR AVS SNAPSHOT
After Visit Summary   11/29/2018    Fran Staley    MRN: [id number]           Patient Information     Date Of Birth          1995        Visit Information        Provider Department      11/29/2018 11:00 AM Jayesh Deshpande MD [facility]        Today's Diagnoses     SABINA (generalized anxiety disorder)    -  1    Gastroesophageal reflux disease without esophagitis        Need for prophylactic vaccination and inoculation against influenza          Care Instructions    ASSESSMENT:   (F41.1) SABINA (generalized anxiety disorder)  (primary encounter diagnosis)  Comment: chronic anxiety and panic attacks.   Plan: venlafaxine (EFFEXOR-XR) 37.5 MG 24 hr capsule        I discussed options for medication for anxiety and panic attacks.  Another option would be counseling, help with managing stress and possibly learning relaxation exercises.  Regular exercise could also be helpful.  SSRI medications work well for longer term use to prevent anxiety and panic attacks.  Reviewed potential side effects.   Start venlafaxine (Effexor) starting with 37.5mg once daily for two weeks, then 2 pills (75mg) once daily.  If intolerable side effects or additional problems, notify me.    Recheck in a month to reevaluate, call earlier with significant side effects.     (K21.9) Gastroesophageal reflux disease without esophagitis  Comment:   Plan: ranitidine (ZANTAC) 300 MG tablet        Take the ranitidine 300mg once daily.   Gastroesophageal reflux (GERD) lifestyle changes that can help improve symptoms include:  Eating smaller meals and not lying down after meals  Elevate head of bed with 6-8 inch blocks or a wedge pillow.   Avoid lying flat on back after eating and at bedtime  Avoid tight fitting clothing  Avoid reflux inducing foods like fat, caffeine, chocolate, carbonated beverages  Maintain an ideal body weight  Avoid alcohol and tobacco  Avoid medications like ibuprofen, naproxen and  aspirin    (Z23) Need for prophylactic vaccination and inoculation against influenza  Comment:   Plan: INTRANASAL FLU VACCINE, QUADRIVALENT LIVE         (FluMist) [89488]- 2-49 YRS, Vaccine         Administration, Nasal/Oral [54254]        Flu shot today.     Counseling Resources for Referrals    [facility] - Psychologist - All ages-family-couples  [place] [facility] - [phone]-[place] - [phone]  [facility] - [phone]    [employer] Ndzyaf-[phone].  Offices in [place], [place] and [facility] Locations.  [facility] - Psychologist - All ages-family-couples  [place]-[phone] Kfzrbciao-[phone]  [place]-[phone]    [facility] [place] Psychologist-Psychiatrist-All ages-family- couples  [place] - [phone]  [facility]-Psychology-All ages-family-couples-[phone]    [facility] -[facility]-[phone], [phone]    [facility] - Psychologist - All ages-family-couples  [phone] - [facility] - Psychologist - All ages-family-couples - [phone]    [facility]-Psychologist -All ages -family-couples  [phone]    [facility] - Adults 55 and older - [place] - [phone]    [facility] [phone]-[place]    Mental health crisis line - (24 Hour) - [phone] through [employer]  Also, Hot line-crisis:[phone]           Follow-ups after your visit        Who to contact     If you have questions or need follow up information about today's clinic visit or your schedule please contact [facility] directly at [phone].  Normal or non-critical lab and imaging results will be communicated to you by MyChart, letter or phone within 4 business days after the clinic has received the results. If you do  not hear from us within 7 days, please contact the clinic through [employer] or phone. If you have a critical or abnormal lab result, we will notify you by phone as soon as possible.  Submit refill requests through [employer] or call your pharmacy and they will forward the refill request to us. Please allow 3 business days for your refill to be completed.          Additional Information About Your Visit        [employer] Benefits Direct Information     [employer] gives you secure access to your electronic health record. If you see a primary care provider, you can also send messages to your care team and make appointments. If you have questions, please call your primary care clinic.  If you do not have a primary care provider, please call [phone] and they will assist you.        Care EveryWhere ID     This is your Care EveryWhere ID. This could be used by other organizations to access your [facility] medical records  [id number]        Your Vitals Were     Pulse Temperature Respirations Pulse Oximetry BMI (Body Mass Index)       72 97.9  F (36.6  C) (Tympanic) 12 98% 29.42 kg/m2        Blood Pressure from Last 3 Encounters:   11/29/18 126/80   03/12/18 139/82   12/05/17 122/72    Weight from Last 3 Encounters:   11/29/18 199 lb 3.2 oz (90.4 kg)   03/12/18 200 lb (90.7 kg)   12/05/17 182 lb (82.6 kg)              We Performed the Following     INTRANASAL FLU VACCINE, QUADRIVALENT LIVE (FluMist) [18088]- 2-49 YRS     Vaccine Administration, Nasal/Oral [27782]          Today's Medication Changes          These changes are accurate as of 11/29/18 11:50 AM.  If you have any questions, ask your nurse or doctor.               Start taking these medicines.        Dose/Directions    ranitidine 300 MG tablet   Commonly known as:  ZANTAC   Used for:  Gastroesophageal reflux disease without esophagitis   Started by:  Jayesh Deshpande MD        Dose:  300 mg   Take 1 tablet (300 mg) by mouth At Bedtime   Quantity:  30 tablet   Refills:  11        venlafaxine 37.5 MG 24 hr capsule   Commonly known as:  EFFEXOR-XR   Used for:  SABINA (generalized anxiety disorder)   Started by:  Jayesh Deshpande MD        Take 1 capsule daily for 14 days, then take 2 capsules daily.   Quantity:  46 capsule   Refills:  1            Where to get your medicines      These medications were sent to [facility] [place] [id number]     Phone:  [phone]     ranitidine 300 MG tablet    venlafaxine 37.5 MG 24 hr capsule                Primary Care Provider Office Phone # Fax #    Meche Montalvo PA-C [phone]       [address] [place], [place] [address]        Equal Access to Services     VAMSHI ABBASI : Hadii aad ku hadasho Soomaali, waaxda luqadaha, qaybta kaalmada adeegyada, vinod forman. So [facility] [phone].    ATENCIÓN: Si habla español, tiene a reed disposición servicios gratuitos de asistencia lingüística. [facility] [phone].    We comply with applicable federal civil rights laws and [place] laws. We do not discriminate on the basis of race, color, national origin, age, disability, sex, sexual orientation, or gender identity.            Thank you!     Thank you for choosing [facility]  for your care. Our goal is always to provide you with excellent care. Hearing back from our patients is one way we can continue to improve our services. Please take a few minutes to complete the written survey that you may receive in the mail after your visit with us. Thank you!             Your Updated Medication List - Protect others around you: Learn how to safely use, store and throw away your medicines at www.disposemymeds.org.          This list is accurate as of 11/29/18 11:50 AM.  Always use your most recent med list.                   Brand Name Dispense Instructions for use Diagnosis    citalopram 10 MG tablet    celeXA    60 tablet     Take 1 tablet (10 mg) by mouth daily In 1-2 wks can increase to 2 tabs daily.  Recheck 3-4 wks.    SABINA (generalized anxiety disorder)       ranitidine 300 MG tablet    ZANTAC    30 tablet    Take 1 tablet (300 mg) by mouth At Bedtime    Gastroesophageal reflux disease without esophagitis       venlafaxine 37.5 MG 24 hr capsule    EFFEXOR-XR    46 capsule    Take 1 capsule daily for 14 days, then take 2 capsules daily.    SABINA (generalized anxiety disorder)

## 2018-11-29 NOTE — PATIENT INSTRUCTIONS
ASSESSMENT:   (F41.1) SABINA (generalized anxiety disorder)  (primary encounter diagnosis)  Comment: chronic anxiety and panic attacks.   Plan: venlafaxine (EFFEXOR-XR) 37.5 MG 24 hr capsule        I discussed options for medication for anxiety and panic attacks.  Another option would be counseling, help with managing stress and possibly learning relaxation exercises.  Regular exercise could also be helpful.  SSRI medications work well for longer term use to prevent anxiety and panic attacks.  Reviewed potential side effects.   Start venlafaxine (Effexor) starting with 37.5mg once daily for two weeks, then 2 pills (75mg) once daily.  If intolerable side effects or additional problems, notify me.    Recheck in a month to reevaluate, call earlier with significant side effects.     (K21.9) Gastroesophageal reflux disease without esophagitis  Comment:   Plan: ranitidine (ZANTAC) 300 MG tablet        Take the ranitidine 300mg once daily.   Gastroesophageal reflux (GERD) lifestyle changes that can help improve symptoms include:  Eating smaller meals and not lying down after meals  Elevate head of bed with 6-8 inch blocks or a wedge pillow.   Avoid lying flat on back after eating and at bedtime  Avoid tight fitting clothing  Avoid reflux inducing foods like fat, caffeine, chocolate, carbonated beverages  Maintain an ideal body weight  Avoid alcohol and tobacco  Avoid medications like ibuprofen, naproxen and aspirin    (Z23) Need for prophylactic vaccination and inoculation against influenza  Comment:   Plan: INTRANASAL FLU VACCINE, QUADRIVALENT LIVE         (FluMist) [13469]- 2-49 YRS, Vaccine         Administration, Nasal/Oral [65203]        Flu shot today.     Counseling Resources for Referrals    [facility] - Psychologist - All ages-family-couples  [place] /[facility] - [phone]-[place] - [phone]  [facility] - [phone]    [facility]-[phone].  Offices in [facility]  [place] and [facility] Locations.  [facility] - Psychologist - All ages-family-couples  [place]-[phone] Qpvcdtxqt-[phone]  [place]-[phone]    [facility] [place], [place] - Psychologist-Psychiatrist-All ages-family- couples  [place] - [phone]  [facility]-Psychology-All ages-family-couples-[phone]    [facility] Services Agency -[facility]-[phone], [phone]    [facility] - Psychologist - All ages-family-couples  [phone] - [facility] - Psychologist - All ages-family-couples - [phone]    [facility]-Psychologist -All ages -family-couples  [phone]    [facility] - Adults 55 and older - [place] - [phone]    [facility] - [phone]    Mental health crisis line - (24 Hour) - [phone] through [employer]  Also, Hot line-crisis:[phone]

## 2019-01-02 ENCOUNTER — HOSPITAL ENCOUNTER (EMERGENCY)
Facility: CLINIC | Age: 24
Discharge: HOME | End: 2019-01-02
Attending: NURSE PRACTITIONER | Admitting: NURSE PRACTITIONER
Payer: COMMERCIAL

## 2019-01-02 VITALS
RESPIRATION RATE: 16 BRPM | WEIGHT: 200 LBS | DIASTOLIC BLOOD PRESSURE: 78 MMHG | TEMPERATURE: 97.9 F | SYSTOLIC BLOOD PRESSURE: 135 MMHG | HEIGHT: 69 IN | HEART RATE: 74 BPM | BODY MASS INDEX: 29.62 KG/M2 | OXYGEN SATURATION: 99 %

## 2019-01-02 DIAGNOSIS — J06.9 VIRAL URI WITH COUGH: ICD-10-CM

## 2019-01-02 DIAGNOSIS — J06.9 ACUTE RESPIRATORY DISEASE: ICD-10-CM

## 2019-01-02 LAB
INTERNAL QC OK POCT: YES
S PYO AG THROAT QL IA.RAPID: NEGATIVE

## 2019-01-02 PROCEDURE — 87081 CULTURE SCREEN ONLY: CPT | Performed by: NURSE PRACTITIONER

## 2019-01-02 PROCEDURE — 99213 OFFICE O/P EST LOW 20 MIN: CPT | Mod: Z6 | Performed by: NURSE PRACTITIONER

## 2019-01-02 PROCEDURE — G0463 HOSPITAL OUTPT CLINIC VISIT: HCPCS | Performed by: NURSE PRACTITIONER

## 2019-01-02 PROCEDURE — 87880 STREP A ASSAY W/OPTIC: CPT | Performed by: NURSE PRACTITIONER

## 2019-01-02 ASSESSMENT — MIFFLIN-ST. JEOR: SCORE: 1897.57

## 2019-01-02 NOTE — ED AVS SNAPSHOT
[facility] Emergency Department  [address] [address]  Phone:  [phone]  Fax:  [phone]                                    Fran Staley   MRN: [id number]    Department:  [facility] Emergency Department   Date of Visit:  1/2/2019           After Visit Summary Signature Page    I have received my discharge instructions, and my questions have been answered. I have discussed any challenges I see with this plan with the nurse or doctor.    ..........................................................................................................................................  Patient/Patient Representative Signature      ..........................................................................................................................................  Patient Representative Print Name and Relationship to Patient    ..................................................               ................................................  Date                                   Time    ..........................................................................................................................................  Reviewed by Signature/Title    ...................................................              ..............................................  Date                                               Time          22EPIC Rev 08/18

## 2019-01-02 NOTE — ED PROVIDER NOTES
History     Chief Complaint   Patient presents with     Pharyngitis     HPI    SUBJECTIVE: Fran Staley  is here today because of:Sore Throat and Cough  The patient has had symptoms of cough, sore throat and nasal congestion/runny nose.   Onset of symptoms was 4 days ago. Course of illness is worsening.  Patient admits to exposure to illness at home or work/school.   Patient denies fever, earache, nausea, vomiting, diarrhea, headache, chest congestion and wheezing  Treatment measures tried include ibuprofen.              Problem List:    Patient Active Problem List    Diagnosis Date Noted     SABINA (generalized anxiety disorder) 10/17/2017     Priority: Medium     Bilateral thoracic back pain 07/26/2017     Priority: Medium     Drug allergy 03/20/2012     Priority: Medium     March 20, 2012 hives from amoxicillin              Past Medical History:    History reviewed. No pertinent past medical history.    Past Surgical History:    Past Surgical History:   Procedure Laterality Date     none         Family History:    Family History   Problem Relation Age of Onset     Mental Illness Father         ADD     Cerebrovascular Disease Maternal Grandmother         Cale bermudez cancer ? Colon ? Arthritis     Diabetes Maternal Grandfather      Heart Disease Maternal Grandfather      Colon Cancer Maternal Grandfather      Cerebrovascular Disease Paternal Grandfather      Myocardial Infarction Maternal Uncle        Social History:  Marital Status:  Single [1]  Social History     Tobacco Use     Smoking status: Never Smoker     Smokeless tobacco: Never Used   Substance Use Topics     Alcohol use: Yes     Comment: once a week     Drug use: No        Medications:      citalopram (CELEXA) 10 MG tablet   ranitidine (ZANTAC) 300 MG tablet   venlafaxine (EFFEXOR-XR) 37.5 MG 24 hr capsule         Review of Systems  As mentioned above in the history present illness. All other systems were reviewed and are negative.    Physical Exam  "  BP: 135/78  Pulse: 74  Temp: 97.9  F (36.6  C)  Resp: 16  Height: 175.3 cm (5' 9\")  Weight: 90.7 kg (200 lb)  SpO2: 99 %      Physical Exam  GENERAL: alert and no acute distress  EYES:  Right conjunctiva is not injected and without discharge.  Left conjunctiva is not injected and without discharge.  EARS: Right TM is normal: no effusions, no erythema, and normal landmarks.  Left TM is normal: no effusions, no erythema, and normal landmarks.  NOSE: Nasal mucosa is normal.  Sinus not tender.  THROAT: normal and exudate absent uvula midline, trismus absent.  NECK: supple with no adenopathy  CARDIAC:NORMAL - regular rate and rhythm without murmur.  RESP: Normal - CTA without rales, rhonchi, or wheezing.  SKIN: normal  ED Course        Procedures      Results for orders placed or performed during the hospital encounter of 01/02/19 (from the past 24 hour(s))   Rapid strep group A screen POCT   Result Value Ref Range    Rapid Strep A Screen NEGATIVE neg    Internal QC OK Yes    Beta strep group A r/o culture   Result Value Ref Range    Specimen Description Throat     Special Requests Specimen collected in eSwab transport (white cap)     Culture Micro PENDING        Medications - No data to display    Assessments & Plan (with Medical Decision Making)     I have reviewed the nursing notes.    I have reviewed the findings, diagnosis, plan and need for follow up with the patient.  Medical Decision Making:  CXR is not indicated.  Rapid Strep test is indicated.     Assessment:  1) Viral pharyngitis and Viral syndrome.    PLAN:    Use ibuprofen, over-the-counter cough medicine, throat lozenges, increase fluids and rest.   Follow up with any questions or problems       Medication List      There are no discharge medications for this visit.         Final diagnoses:   Viral URI with cough       1/2/2019   [facility] EMERGENCY DEPARTMENT     Donya Brannon APRN CNP  01/02/19 2203    "

## 2019-01-04 LAB
BACTERIA SPEC CULT: NORMAL
Lab: NORMAL
SPECIMEN SOURCE: NORMAL

## 2019-01-04 NOTE — RESULT ENCOUNTER NOTE
Final Beta strep group A r/o culture is NEGATIVE for Group A streptococcus.    No treatment or change in treatment per [facility] Strep protocol.

## 2019-01-05 ENCOUNTER — NURSE TRIAGE (OUTPATIENT)
Dept: NURSING | Facility: CLINIC | Age: 24
End: 2019-01-05

## 2019-01-05 NOTE — TELEPHONE ENCOUNTER
"Fiance calling; patient is not present (is ice fishing).  Fiance states had strep test several days ago and it was negative and now patient has a rash on chest, \"whole chest is red\".  FNA advised caller to have patient call back to speak with FNA when able.      "

## 2019-01-07 ENCOUNTER — HOSPITAL ENCOUNTER (EMERGENCY)
Facility: CLINIC | Age: 24
Discharge: LEFT BEFORE DISCHARGE | End: 2019-01-07
Payer: COMMERCIAL

## 2019-01-07 NOTE — PROGRESS NOTES
"  SUBJECTIVE:   Fran Staley is a 23 year old male who presents to clinic today for the following health issues:    Rash  He comes with rash      Duration:  5 days     Description  Location: \"all over\"   Itching: moderate    Intensity:  moderate    Accompanying signs and symptoms: None    History (similar episodes/previous evaluation): None    Precipitating or alleviating factors:  New exposures: strep - has sore throat last week and was tested 1/2 and this was negative.   Recent travel: no      Therapies tried and outcome: none          Problem list and histories reviewed & adjusted, as indicated.  Additional history: as documented    Patient Active Problem List   Diagnosis     Drug allergy     Bilateral thoracic back pain     SABINA (generalized anxiety disorder)     Past Surgical History:   Procedure Laterality Date     none         Social History     Tobacco Use     Smoking status: Never Smoker     Smokeless tobacco: Never Used   Substance Use Topics     Alcohol use: Yes     Comment: once a week     Family History   Problem Relation Age of Onset     Mental Illness Father         ADD     Cerebrovascular Disease Maternal Grandmother         Cale bermudez cancer ? Colon ? Arthritis     Diabetes Maternal Grandfather      Heart Disease Maternal Grandfather      Colon Cancer Maternal Grandfather      Cerebrovascular Disease Paternal Grandfather      Myocardial Infarction Maternal Uncle          Current Outpatient Medications   Medication Sig Dispense Refill     ranitidine (ZANTAC) 300 MG tablet Take 1 tablet (300 mg) by mouth At Bedtime 30 tablet 11     Allergies   Allergen Reactions     Amoxicillin Hives     Penicillins Hives     Sulfa Drugs        Reviewed and updated as needed this visit by clinical staff       Reviewed and updated as needed this visit by Provider         ROS:  CONSTITUTIONAL: NEGATIVE for fever, chills, change in weight  ENT/MOUTH: NEGATIVE for ear, mouth and throat problems  RESP: NEGATIVE for " "significant cough or SOB  CV: NEGATIVE for chest pain, palpitations or peripheral edema    OBJECTIVE:     /76 (Cuff Size: Adult Large)   Pulse 72   Temp 96.8  F (36  C) (Tympanic)   Resp 16   Ht 1.753 m (5' 9\")   Wt 92.5 kg (204 lb)   BMI 30.13 kg/m    Body mass index is 30.13 kg/m .  GENERAL: healthy, alert and no distress  NECK: no adenopathy, no asymmetry, masses, or scars and thyroid normal to palpation  RESP: lungs clear to auscultation - no rales, rhonchi or wheezes  CV: regular rate and rhythm, normal S1 S2, no S3 or S4, no murmur, click or rub, no peripheral edema and peripheral pulses strong  ABDOMEN: soft, nontender, no hepatosplenomegaly, no masses and bowel sounds normal  MS: no gross musculoskeletal defects noted, no edema  SKIN   PITYRIASIS ROSEA TYPE RASH WITH LARGE OVAL HERALD PATCH ON HIS LEFT THIGH.        ASSESSMENT/PLAN:     (L42) Pityriasis rosea  Comment: CLASSIC   Plan: BENADRYL AND OBERSVTION     ASSESSMENT/PLAN:      ICD-10-CM    1. Pityriasis rosea L42        Patient Instructions   1. This is pityriasis rosea and it will resolve on its own    2. Most often viral in nature.     3. Call if not better.                     There are no diagnoses linked to this encounter.        Jayesh Borges MD  [facility]  "

## 2019-01-08 ENCOUNTER — OFFICE VISIT (OUTPATIENT)
Dept: FAMILY MEDICINE | Facility: CLINIC | Age: 24
End: 2019-01-08
Payer: COMMERCIAL

## 2019-01-08 VITALS
DIASTOLIC BLOOD PRESSURE: 76 MMHG | TEMPERATURE: 96.8 F | RESPIRATION RATE: 16 BRPM | HEIGHT: 69 IN | HEART RATE: 72 BPM | WEIGHT: 204 LBS | SYSTOLIC BLOOD PRESSURE: 120 MMHG | BODY MASS INDEX: 30.21 KG/M2

## 2019-01-08 DIAGNOSIS — L42 PITYRIASIS ROSEA: ICD-10-CM

## 2019-01-08 PROCEDURE — 99213 OFFICE O/P EST LOW 20 MIN: CPT | Performed by: FAMILY MEDICINE

## 2019-01-08 ASSESSMENT — MIFFLIN-ST. JEOR: SCORE: 1915.72

## 2019-01-08 NOTE — PATIENT INSTRUCTIONS
1. This is pityriasis rosea and it will resolve on its own    2. Most often viral in nature.     3. Call if not better.

## 2019-01-09 ENCOUNTER — MYC MEDICAL ADVICE (OUTPATIENT)
Dept: FAMILY MEDICINE | Facility: CLINIC | Age: 24
End: 2019-01-09

## 2019-01-09 DIAGNOSIS — L29.9 PRURITIC CONDITION: Primary | ICD-10-CM

## 2019-01-09 RX ORDER — HYDROXYZINE HYDROCHLORIDE 25 MG/1
25 TABLET, FILM COATED ORAL EVERY 4 HOURS PRN
Qty: 20 TABLET | Refills: 1 | Status: SHIPPED | OUTPATIENT
Start: 2019-01-09 | End: 2019-01-19

## 2019-05-04 ENCOUNTER — E-VISIT (OUTPATIENT)
Dept: FAMILY MEDICINE | Facility: CLINIC | Age: 24
End: 2019-05-04
Payer: COMMERCIAL

## 2019-05-04 DIAGNOSIS — F41.1 GAD (GENERALIZED ANXIETY DISORDER): Primary | ICD-10-CM

## 2019-05-04 PROCEDURE — 99207 ZZC NO BILLABLE SERVICE THIS VISIT: CPT | Performed by: PHYSICIAN ASSISTANT

## 2019-05-04 ASSESSMENT — ANXIETY QUESTIONNAIRES
3. WORRYING TOO MUCH ABOUT DIFFERENT THINGS: MORE THAN HALF THE DAYS
1. FEELING NERVOUS, ANXIOUS, OR ON EDGE: MORE THAN HALF THE DAYS
2. NOT BEING ABLE TO STOP OR CONTROL WORRYING: MORE THAN HALF THE DAYS
GAD7 TOTAL SCORE: 16
7. FEELING AFRAID AS IF SOMETHING AWFUL MIGHT HAPPEN: MORE THAN HALF THE DAYS
GAD7 TOTAL SCORE: 16
5. BEING SO RESTLESS THAT IT IS HARD TO SIT STILL: NEARLY EVERY DAY
7. FEELING AFRAID AS IF SOMETHING AWFUL MIGHT HAPPEN: MORE THAN HALF THE DAYS
GAD7 TOTAL SCORE: 16
4. TROUBLE RELAXING: NEARLY EVERY DAY
6. BECOMING EASILY ANNOYED OR IRRITABLE: MORE THAN HALF THE DAYS

## 2019-05-04 ASSESSMENT — PATIENT HEALTH QUESTIONNAIRE - PHQ9
SUM OF ALL RESPONSES TO PHQ QUESTIONS 1-9: 10
SUM OF ALL RESPONSES TO PHQ QUESTIONS 1-9: 10
10. IF YOU CHECKED OFF ANY PROBLEMS, HOW DIFFICULT HAVE THESE PROBLEMS MADE IT FOR YOU TO DO YOUR WORK, TAKE CARE OF THINGS AT HOME, OR GET ALONG WITH OTHER PEOPLE: VERY DIFFICULT

## 2019-05-05 ASSESSMENT — ANXIETY QUESTIONNAIRES: GAD7 TOTAL SCORE: 16

## 2019-05-05 ASSESSMENT — PATIENT HEALTH QUESTIONNAIRE - PHQ9: SUM OF ALL RESPONSES TO PHQ QUESTIONS 1-9: 10

## 2019-09-22 ENCOUNTER — APPOINTMENT (OUTPATIENT)
Dept: GENERAL RADIOLOGY | Facility: CLINIC | Age: 24
End: 2019-09-22
Attending: EMERGENCY MEDICINE
Payer: COMMERCIAL

## 2019-09-22 ENCOUNTER — HOSPITAL ENCOUNTER (EMERGENCY)
Facility: CLINIC | Age: 24
Discharge: HOME | End: 2019-09-22
Attending: EMERGENCY MEDICINE | Admitting: EMERGENCY MEDICINE
Payer: COMMERCIAL

## 2019-09-22 VITALS
RESPIRATION RATE: 16 BRPM | DIASTOLIC BLOOD PRESSURE: 63 MMHG | HEIGHT: 69 IN | OXYGEN SATURATION: 97 % | TEMPERATURE: 99.3 F | WEIGHT: 195 LBS | HEART RATE: 103 BPM | BODY MASS INDEX: 28.88 KG/M2 | SYSTOLIC BLOOD PRESSURE: 127 MMHG

## 2019-09-22 DIAGNOSIS — J18.9 COMMUNITY ACQUIRED PNEUMONIA OF LEFT LUNG, UNSPECIFIED PART OF LUNG: ICD-10-CM

## 2019-09-22 LAB
ALBUMIN SERPL-MCNC: 3.7 G/DL (ref 3.4–5)
ALBUMIN UR-MCNC: NEGATIVE MG/DL
ALP SERPL-CCNC: 62 U/L (ref 40–150)
ALT SERPL W P-5'-P-CCNC: 36 U/L (ref 0–70)
ANION GAP SERPL CALCULATED.3IONS-SCNC: 9 MMOL/L (ref 3–14)
APPEARANCE UR: CLEAR
AST SERPL W P-5'-P-CCNC: 24 U/L (ref 0–45)
BASOPHILS # BLD AUTO: 0 10E9/L (ref 0–0.2)
BASOPHILS NFR BLD AUTO: 0.3 %
BILIRUB SERPL-MCNC: 0.4 MG/DL (ref 0.2–1.3)
BILIRUB UR QL STRIP: NEGATIVE
BUN SERPL-MCNC: 12 MG/DL (ref 7–30)
CALCIUM SERPL-MCNC: 8.7 MG/DL (ref 8.5–10.1)
CHLORIDE SERPL-SCNC: 100 MMOL/L (ref 94–109)
CO2 SERPL-SCNC: 27 MMOL/L (ref 20–32)
COLOR UR AUTO: YELLOW
CREAT SERPL-MCNC: 0.83 MG/DL (ref 0.66–1.25)
DIFFERENTIAL METHOD BLD: ABNORMAL
EOSINOPHIL # BLD AUTO: 0 10E9/L (ref 0–0.7)
EOSINOPHIL NFR BLD AUTO: 0.3 %
ERYTHROCYTE [DISTWIDTH] IN BLOOD BY AUTOMATED COUNT: 11.8 % (ref 10–15)
GFR SERPL CREATININE-BSD FRML MDRD: >90 ML/MIN/{1.73_M2}
GLUCOSE SERPL-MCNC: 85 MG/DL (ref 70–99)
GLUCOSE UR STRIP-MCNC: NEGATIVE MG/DL
HCT VFR BLD AUTO: 39.1 % (ref 40–53)
HGB BLD-MCNC: 13.1 G/DL (ref 13.3–17.7)
HGB UR QL STRIP: NEGATIVE
IMM GRANULOCYTES # BLD: 0 10E9/L (ref 0–0.4)
IMM GRANULOCYTES NFR BLD: 0.3 %
KETONES UR STRIP-MCNC: NEGATIVE MG/DL
LACTATE BLD-SCNC: 1.1 MMOL/L (ref 0.7–2)
LEUKOCYTE ESTERASE UR QL STRIP: NEGATIVE
LYMPHOCYTES # BLD AUTO: 1.6 10E9/L (ref 0.8–5.3)
LYMPHOCYTES NFR BLD AUTO: 21.8 %
MCH RBC QN AUTO: 29.2 PG (ref 26.5–33)
MCHC RBC AUTO-ENTMCNC: 33.5 G/DL (ref 31.5–36.5)
MCV RBC AUTO: 87 FL (ref 78–100)
MONOCYTES # BLD AUTO: 1.2 10E9/L (ref 0–1.3)
MONOCYTES NFR BLD AUTO: 15.9 %
NEUTROPHILS # BLD AUTO: 4.6 10E9/L (ref 1.6–8.3)
NEUTROPHILS NFR BLD AUTO: 61.4 %
NITRATE UR QL: NEGATIVE
NRBC # BLD AUTO: 0 10*3/UL
NRBC BLD AUTO-RTO: 0 /100
PH UR STRIP: 8 PH (ref 5–7)
PLATELET # BLD AUTO: 252 10E9/L (ref 150–450)
POTASSIUM SERPL-SCNC: 3.7 MMOL/L (ref 3.4–5.3)
PROT SERPL-MCNC: 8.2 G/DL (ref 6.8–8.8)
RBC # BLD AUTO: 4.48 10E12/L (ref 4.4–5.9)
SODIUM SERPL-SCNC: 136 MMOL/L (ref 133–144)
SOURCE: ABNORMAL
SP GR UR STRIP: 1.02 (ref 1–1.03)
UROBILINOGEN UR STRIP-MCNC: 4 MG/DL (ref 0–2)
WBC # BLD AUTO: 7.4 10E9/L (ref 4–11)

## 2019-09-22 PROCEDURE — 71046 X-RAY EXAM CHEST 2 VIEWS: CPT

## 2019-09-22 PROCEDURE — 96367 TX/PROPH/DG ADDL SEQ IV INF: CPT | Performed by: EMERGENCY MEDICINE

## 2019-09-22 PROCEDURE — 85025 COMPLETE CBC W/AUTO DIFF WBC: CPT | Performed by: EMERGENCY MEDICINE

## 2019-09-22 PROCEDURE — 25000128 H RX IP 250 OP 636: Performed by: EMERGENCY MEDICINE

## 2019-09-22 PROCEDURE — 96366 THER/PROPH/DIAG IV INF ADDON: CPT | Performed by: EMERGENCY MEDICINE

## 2019-09-22 PROCEDURE — 81003 URINALYSIS AUTO W/O SCOPE: CPT | Performed by: EMERGENCY MEDICINE

## 2019-09-22 PROCEDURE — 83605 ASSAY OF LACTIC ACID: CPT | Performed by: EMERGENCY MEDICINE

## 2019-09-22 PROCEDURE — 99284 EMERGENCY DEPT VISIT MOD MDM: CPT | Mod: 25 | Performed by: EMERGENCY MEDICINE

## 2019-09-22 PROCEDURE — 96365 THER/PROPH/DIAG IV INF INIT: CPT | Performed by: EMERGENCY MEDICINE

## 2019-09-22 PROCEDURE — 99284 EMERGENCY DEPT VISIT MOD MDM: CPT | Mod: Z6 | Performed by: EMERGENCY MEDICINE

## 2019-09-22 PROCEDURE — 96361 HYDRATE IV INFUSION ADD-ON: CPT | Performed by: EMERGENCY MEDICINE

## 2019-09-22 PROCEDURE — 96375 TX/PRO/DX INJ NEW DRUG ADDON: CPT | Performed by: EMERGENCY MEDICINE

## 2019-09-22 PROCEDURE — 87040 BLOOD CULTURE FOR BACTERIA: CPT | Performed by: EMERGENCY MEDICINE

## 2019-09-22 PROCEDURE — 25800030 ZZH RX IP 258 OP 636: Performed by: EMERGENCY MEDICINE

## 2019-09-22 PROCEDURE — 87086 URINE CULTURE/COLONY COUNT: CPT | Performed by: EMERGENCY MEDICINE

## 2019-09-22 PROCEDURE — 80053 COMPREHEN METABOLIC PANEL: CPT | Performed by: EMERGENCY MEDICINE

## 2019-09-22 RX ORDER — AZITHROMYCIN 250 MG/1
TABLET, FILM COATED ORAL
Qty: 4 TABLET | Refills: 0 | Status: SHIPPED | OUTPATIENT
Start: 2019-09-22 | End: 2020-07-28

## 2019-09-22 RX ORDER — CEFTRIAXONE SODIUM 2 G/50ML
2 INJECTION, SOLUTION INTRAVENOUS ONCE
Status: COMPLETED | OUTPATIENT
Start: 2019-09-22 | End: 2019-09-22

## 2019-09-22 RX ORDER — METOCLOPRAMIDE HYDROCHLORIDE 5 MG/ML
10 INJECTION INTRAMUSCULAR; INTRAVENOUS ONCE
Status: COMPLETED | OUTPATIENT
Start: 2019-09-22 | End: 2019-09-22

## 2019-09-22 RX ORDER — SODIUM CHLORIDE 9 MG/ML
1000 INJECTION, SOLUTION INTRAVENOUS CONTINUOUS
Status: DISCONTINUED | OUTPATIENT
Start: 2019-09-22 | End: 2019-09-23 | Stop reason: HOSPADM

## 2019-09-22 RX ORDER — KETOROLAC TROMETHAMINE 30 MG/ML
30 INJECTION, SOLUTION INTRAMUSCULAR; INTRAVENOUS ONCE
Status: COMPLETED | OUTPATIENT
Start: 2019-09-22 | End: 2019-09-22

## 2019-09-22 RX ORDER — CODEINE PHOSPHATE AND GUAIFENESIN 10; 100 MG/5ML; MG/5ML
1-2 SOLUTION ORAL EVERY 4 HOURS PRN
Qty: 180 ML | Refills: 0 | Status: SHIPPED | OUTPATIENT
Start: 2019-09-22 | End: 2020-07-28

## 2019-09-22 RX ORDER — CEFUROXIME AXETIL 500 MG/1
500 TABLET ORAL 2 TIMES DAILY
Qty: 20 TABLET | Refills: 0 | Status: SHIPPED | OUTPATIENT
Start: 2019-09-22 | End: 2019-10-02

## 2019-09-22 RX ADMIN — SODIUM CHLORIDE 1000 ML: 9 INJECTION, SOLUTION INTRAVENOUS at 17:15

## 2019-09-22 RX ADMIN — KETOROLAC TROMETHAMINE 30 MG: 30 INJECTION, SOLUTION INTRAMUSCULAR at 17:18

## 2019-09-22 RX ADMIN — METOCLOPRAMIDE 10 MG: 5 INJECTION, SOLUTION INTRAMUSCULAR; INTRAVENOUS at 17:21

## 2019-09-22 RX ADMIN — AZITHROMYCIN MONOHYDRATE 500 MG: 500 INJECTION, POWDER, LYOPHILIZED, FOR SOLUTION INTRAVENOUS at 20:30

## 2019-09-22 RX ADMIN — SODIUM CHLORIDE 1000 ML: 9 INJECTION, SOLUTION INTRAVENOUS at 16:03

## 2019-09-22 RX ADMIN — CEFTRIAXONE SODIUM 2 G: 2 INJECTION, SOLUTION INTRAVENOUS at 19:16

## 2019-09-22 RX ADMIN — SODIUM CHLORIDE 1000 ML: 9 INJECTION, SOLUTION INTRAVENOUS at 21:28

## 2019-09-22 ASSESSMENT — MIFFLIN-ST. JEOR: SCORE: 1869.89

## 2019-09-22 NOTE — ED PROVIDER NOTES
History     Chief Complaint   Patient presents with     Flank Pain     Fever     chills and sweats     Headache     HPI  Fran Staley is a 24 year old male who presents to the Emergency Department with atraumatic bilateral lower mid-back pain, onset three days ago. Pain described as constant and dull, with occasional shooting pains, non-pleuritic and non-radiating. Diaphoresis, chills, myalgias and arthralgias began on the same day, along with severe headaches and cough. Cough is non-productive. No sore throat. Headaches originate behind the eyes, radiate over the top of the head. These are refractory to tylenol and ibuprofen, although this improved back pain.  Several episodes of vomiting.  Fever developed two days ago. He reports several episodes of diarrhea.  No neck stiffness or rash.  No recent tick bites or rash. Non-smoker and denies vaping. No known infectious exposures. No recent travel. History of headaches in childhood, but denies history of migraine headaches.     Allergies:  Allergies   Allergen Reactions     Amoxicillin Hives     Penicillins Hives     Sulfa Drugs        Problem List:    Patient Active Problem List    Diagnosis Date Noted     Pityriasis rosea 01/08/2019     Priority: Medium     SABINA (generalized anxiety disorder) 10/17/2017     Priority: Medium     Bilateral thoracic back pain 07/26/2017     Priority: Medium     Drug allergy 03/20/2012     Priority: Medium     March 20, 2012 hives from amoxicillin              Past Medical History:    History reviewed. No pertinent past medical history.    Past Surgical History:    Past Surgical History:   Procedure Laterality Date     none         Family History:    Family History   Problem Relation Age of Onset     Mental Illness Father         ADD     Cerebrovascular Disease Maternal Grandmother         Cale bermudez cancer ? Colon ? Arthritis     Diabetes Maternal Grandfather      Heart Disease Maternal Grandfather      Colon Cancer Maternal  "Grandfather      Cerebrovascular Disease Paternal Grandfather      Myocardial Infarction Maternal Uncle        Social History:  Marital Status:  Single [1]  Social History     Tobacco Use     Smoking status: Never Smoker     Smokeless tobacco: Never Used   Substance Use Topics     Alcohol use: Yes     Comment: once a week     Drug use: No        Medications:    azithromycin (ZITHROMAX) 250 MG tablet  cefuroxime (CEFTIN) 500 MG tablet  guaiFENesin-codeine (ROBITUSSIN AC) 100-10 MG/5ML solution  ranitidine (ZANTAC) 300 MG tablet        Review of Systems  As mentioned above in the history present illness. All other systems were reviewed and are negative.    Physical Exam   BP: 121/74  Pulse: 112  Heart Rate: 99  Temp: 103  F (39.4  C)  Resp: 18  Height: 175.3 cm (5' 9\")  Weight: 88.5 kg (195 lb)  SpO2: 99 %      Physical Exam  Vitals signs and nursing note reviewed.   Constitutional:       General: He is not in acute distress.     Appearance: Normal appearance. He is well-developed. He is not ill-appearing or diaphoretic.   HENT:      Head: Normocephalic and atraumatic.      Nose: Nose normal.      Mouth/Throat:      Mouth: Mucous membranes are dry. No oral lesions.      Tongue: No lesions.      Palate: No lesions.      Pharynx: Oropharynx is clear. Uvula midline. No pharyngeal swelling, oropharyngeal exudate, posterior oropharyngeal erythema or uvula swelling.      Tonsils: No tonsillar exudate or tonsillar abscesses.   Eyes:      General: No scleral icterus.     Extraocular Movements: Extraocular movements intact.      Conjunctiva/sclera: Conjunctivae normal.   Neck:      Musculoskeletal: Normal range of motion and neck supple. Normal range of motion. No neck rigidity.      Trachea: Phonation normal. No tracheal deviation.   Cardiovascular:      Rate and Rhythm: Normal rate and regular rhythm.      Heart sounds: Normal heart sounds. No murmur. No friction rub. No gallop.    Pulmonary:      Effort: Pulmonary effort is " normal. No respiratory distress.      Breath sounds: Normal breath sounds. No stridor. No wheezing, rhonchi or rales.   Abdominal:      General: Bowel sounds are normal. There is no distension.      Palpations: Abdomen is soft.      Tenderness: There is no tenderness.   Musculoskeletal: Normal range of motion.         General: No deformity or signs of injury.        Thoracic back: He exhibits tenderness. He exhibits normal range of motion.        Back:       Right lower leg: No edema.      Left lower leg: No edema.   Skin:     General: Skin is warm and dry.      Coloration: Skin is not pale.      Findings: No erythema or rash.   Neurological:      General: No focal deficit present.      Mental Status: He is alert and oriented to person, place, and time.      Motor: No weakness.      Coordination: Coordination normal.   Psychiatric:         Mood and Affect: Mood normal.         Behavior: Behavior normal.         ED Course        Procedures             Results for orders placed or performed during the hospital encounter of 09/22/19   XR Chest 2 Views    Narrative    CHEST TWO VIEW 9/22/2019 5:56 PM     HISTORY: Chest pain, cough and fever.    COMPARISON: 5/5/2015.      Impression    IMPRESSION: Interval development of infiltrate in the left midlung  zone laterally which is most likely in the lower lobe but difficult to  exactly localize on the lateral view. The remainder of the lungs are  clear and the heart size is normal and unchanged.    DANIELLE SESAY MD   UA reflex to Microscopic   Result Value Ref Range    Color Urine Yellow     Appearance Urine Clear     Glucose Urine Negative NEG^Negative mg/dL    Bilirubin Urine Negative NEG^Negative    Ketones Urine Negative NEG^Negative mg/dL    Specific Gravity Urine 1.019 1.003 - 1.035    Blood Urine Negative NEG^Negative    pH Urine 8.0 (H) 5.0 - 7.0 pH    Protein Albumin Urine Negative NEG^Negative mg/dL    Urobilinogen mg/dL 4.0 (H) 0.0 - 2.0 mg/dL    Nitrite Urine  Negative NEG^Negative    Leukocyte Esterase Urine Negative NEG^Negative    Source Midstream Urine    Comprehensive metabolic panel   Result Value Ref Range    Sodium 136 133 - 144 mmol/L    Potassium 3.7 3.4 - 5.3 mmol/L    Chloride 100 94 - 109 mmol/L    Carbon Dioxide 27 20 - 32 mmol/L    Anion Gap 9 3 - 14 mmol/L    Glucose 85 70 - 99 mg/dL    Urea Nitrogen 12 7 - 30 mg/dL    Creatinine 0.83 0.66 - 1.25 mg/dL    GFR Estimate >90 >60 mL/min/[1.73_m2]    GFR Estimate If Black >90 >60 mL/min/[1.73_m2]    Calcium 8.7 8.5 - 10.1 mg/dL    Bilirubin Total 0.4 0.2 - 1.3 mg/dL    Albumin 3.7 3.4 - 5.0 g/dL    Protein Total 8.2 6.8 - 8.8 g/dL    Alkaline Phosphatase 62 40 - 150 U/L    ALT 36 0 - 70 U/L    AST 24 0 - 45 U/L   Lactic acid whole blood   Result Value Ref Range    Lactic Acid 1.1 0.7 - 2.0 mmol/L   CBC with platelets differential   Result Value Ref Range    WBC 7.4 4.0 - 11.0 10e9/L    RBC Count 4.48 4.4 - 5.9 10e12/L    Hemoglobin 13.1 (L) 13.3 - 17.7 g/dL    Hematocrit 39.1 (L) 40.0 - 53.0 %    MCV 87 78 - 100 fl    MCH 29.2 26.5 - 33.0 pg    MCHC 33.5 31.5 - 36.5 g/dL    RDW 11.8 10.0 - 15.0 %    Platelet Count 252 150 - 450 10e9/L    Diff Method Automated Method     % Neutrophils 61.4 %    % Lymphocytes 21.8 %    % Monocytes 15.9 %    % Eosinophils 0.3 %    % Basophils 0.3 %    % Immature Granulocytes 0.3 %    Nucleated RBCs 0 0 /100    Absolute Neutrophil 4.6 1.6 - 8.3 10e9/L    Absolute Lymphocytes 1.6 0.8 - 5.3 10e9/L    Absolute Monocytes 1.2 0.0 - 1.3 10e9/L    Absolute Eosinophils 0.0 0.0 - 0.7 10e9/L    Absolute Basophils 0.0 0.0 - 0.2 10e9/L    Abs Immature Granulocytes 0.0 0 - 0.4 10e9/L    Absolute Nucleated RBC 0.0    Blood culture   Result Value Ref Range    Specimen Description Blood Left Hand     Special Requests Aerobic and anaerobic bottles received     Culture Micro No growth    Blood culture   Result Value Ref Range    Specimen Description Blood Left Hand     Special Requests Aerobic and  anaerobic bottles received     Culture Micro No growth    Urine Culture Aerobic Bacterial   Result Value Ref Range    Specimen Description Midstream Urine     Special Requests Specimen received in preservative     Culture Micro No growth    Blood culture (one site)   Result Value Ref Range    Specimen Description Blood Left Arm     Special Requests Aerobic and anaerobic bottles received     Culture Micro No growth        I independently reviewed the X-rays: Agree with the Radiologist's interpretation.    Medications   0.9% sodium chloride BOLUS (0 mLs Intravenous Stopped 9/22/19 2127)     Followed by   0.9% sodium chloride BOLUS (0 mLs Intravenous Stopped 9/22/19 2136)     Followed by   0.9% sodium chloride BOLUS (0 mLs Intravenous Stopped 9/22/19 1920)   ketorolac (TORADOL) injection 30 mg (30 mg Intravenous Given 9/22/19 1718)   metoclopramide (REGLAN) injection 10 mg (10 mg Intravenous Given 9/22/19 1721)   cefTRIAXone IN D5W (ROCEPHIN) intermittent infusion 2 g (0 g Intravenous Stopped 9/22/19 2137)   azithromycin (ZITHROMAX) 500 mg in sodium chloride 0.9 % 250 mL intermittent infusion (0 mg Intravenous Stopped 9/22/19 2136)         Assessments & Plan (with Medical Decision Making)   Influenza-like illness with left lower lobe pneumonia on chest x-ray.  No hypoxia or respiratory distress.  Musculoskeletal back pain.  He was aggressively hydrated, given ceftriaxone and azithromycin IV and appears stable, improved and comfortable discharge home on Azithromycin and Ceftin additional beta-lactam coverage. He was provided instructions for supportive care and will return as needed for worsened condition or worsening symptoms, or new problems or concerns.  He was instructed to follow-up in clinic for recheck if not improving over the next 2-3 days, otherwise in several weeks for follow-up chest x-ray he will return for new or worsening symptoms.      I have reviewed the nursing notes.    I have reviewed the findings,  diagnosis, plan and need for follow up with the patient.    Discharge Medication List as of 9/22/2019  9:49 PM      START taking these medications    Details   azithromycin (ZITHROMAX) 250 MG tablet 1 tablet by mouth once daily for 4 days beginning Monday, 9/23/2019.  First dose given in the emergency department., Disp-4 tablet, R-0, Local Print      cefuroxime (CEFTIN) 500 MG tablet Take 1 tablet (500 mg) by mouth 2 times daily for 10 days, Disp-20 tablet, R-0, Local Print      guaiFENesin-codeine (ROBITUSSIN AC) 100-10 MG/5ML solution Take 5-10 mLs by mouth every 4 hours as needed for cough (cough), Disp-180 mL, R-0, Local Print             Final diagnoses:   Community acquired pneumonia of left lung, unspecified part of lung     This document serves as a record of the services and decisions personally performed and made by Christos Cherry MD. It was created on his behalf by Bri Power, a trained medical scribe. The creation of this document is based the provider's statements to the medical scribe.  Bri Power 5:39 PM 9/22/2019    Provider:   The information in this document, created by the medical scribe for me, accurately reflects the services I personally performed and the decisions made by me. I have reviewed and approved this document for accuracy prior to leaving the patient care area.  Christos Cherry MD 5:39 PM 9/22/2019 9/22/2019   [facility] EMERGENCY DEPARTMENT      Christos Cherry MD  09/28/19 1037

## 2019-09-22 NOTE — ED TRIAGE NOTES
Pt has R flank pain that started on Thursday, pain is constant dull with occasional sharp stabbing pain. Pain was much more severe on Thursday and Friday. Pt has had chills and sweats since  then. Pt has HA behind eyes and top of head that started Thursday noc. Pt has cough that is much worse at noc, occasionally productive of yellow sputum.

## 2019-09-22 NOTE — LETTER
September 22, 2019      To Whom It May Concern:      Fran Staley was seen in our Emergency Department today, Sunday 09/22/19.  I expect his condition to improve over the next several days.  He is excuse him from work Monday 9/22/19 and Tuesday, 9/23/2019 if needed.      Sincerely,        SAGE Cherry MD

## 2019-09-22 NOTE — ED AVS SNAPSHOT
[facility] Emergency Department  [address] [address]  Phone:  [phone]  Fax:  [phone]                                    Fran Staley   MRN: [id number]    Department:  [facility] Emergency Department   Date of Visit:  9/22/2019           After Visit Summary Signature Page    I have received my discharge instructions, and my questions have been answered. I have discussed any challenges I see with this plan with the nurse or doctor.    ..........................................................................................................................................  Patient/Patient Representative Signature      ..........................................................................................................................................  Patient Representative Print Name and Relationship to Patient    ..................................................               ................................................  Date                                   Time    ..........................................................................................................................................  Reviewed by Signature/Title    ...................................................              ..............................................  Date                                               Time          22EPIC Rev 08/18

## 2019-09-22 NOTE — ED NOTES
Took tylenol at 1400 and 800mg of ibuprofen. Started 3 days ago with bilateral flank pain, fever, cough. Just getting worse. Nonproductive cough

## 2019-09-23 LAB
BACTERIA SPEC CULT: NO GROWTH
Lab: NORMAL
SPECIMEN SOURCE: NORMAL

## 2019-09-23 NOTE — ED NOTES
States the flickering has stopped. Feeling much better and wants to go. Work note given and walked to pharmacy to get RX

## 2019-09-23 NOTE — RESULT ENCOUNTER NOTE
Emergency Dept/Urgent Care discharge antibiotic (if prescribed): Cefuroxime (Ceftin) 500 mg PO tablet, 1 tablet by mouth 2 times daily for 10 days  Date of Rx (if applicable):  9/22/19  No changes in treatment per Urine culture protocol.

## 2019-09-23 NOTE — ED NOTES
"Pt states he is seeing a \"flickering\" in his left eye. Discussed with Dr. Cherry. md states pt is ok to go home.  "

## 2019-09-24 NOTE — RESULT ENCOUNTER NOTE
Final urine culture report is NEGATIVE per [facility] ED Lab Result protocol.    If NEGATIVE result, no change in treatment, per [facility] ED Lab Result protocol.

## 2019-09-28 LAB
BACTERIA SPEC CULT: NO GROWTH
Lab: NORMAL
SPECIMEN SOURCE: NORMAL

## 2020-01-18 ENCOUNTER — E-VISIT (OUTPATIENT)
Dept: FAMILY MEDICINE | Facility: CLINIC | Age: 25
End: 2020-01-18
Payer: COMMERCIAL

## 2020-01-18 DIAGNOSIS — J01.90 ACUTE SINUSITIS, RECURRENCE NOT SPECIFIED, UNSPECIFIED LOCATION: Primary | ICD-10-CM

## 2020-01-18 PROCEDURE — 99421 OL DIG E/M SVC 5-10 MIN: CPT | Performed by: FAMILY MEDICINE

## 2020-01-19 ENCOUNTER — MYC MEDICAL ADVICE (OUTPATIENT)
Dept: FAMILY MEDICINE | Facility: CLINIC | Age: 25
End: 2020-01-19

## 2020-01-19 DIAGNOSIS — J01.90 ACUTE SINUSITIS, RECURRENCE NOT SPECIFIED, UNSPECIFIED LOCATION: ICD-10-CM

## 2020-01-19 RX ORDER — DOXYCYCLINE HYCLATE 100 MG
100 TABLET ORAL 2 TIMES DAILY
Qty: 20 TABLET | Refills: 0 | Status: SHIPPED | OUTPATIENT
Start: 2020-01-19 | End: 2020-01-29

## 2020-02-17 ENCOUNTER — HEALTH MAINTENANCE LETTER (OUTPATIENT)
Age: 25
End: 2020-02-17

## 2020-03-30 ENCOUNTER — E-VISIT (OUTPATIENT)
Dept: FAMILY MEDICINE | Facility: CLINIC | Age: 25
End: 2020-03-30
Payer: COMMERCIAL

## 2020-03-30 DIAGNOSIS — K13.0 ABNORMAL COLOR OF LIPS: Primary | ICD-10-CM

## 2020-03-30 PROCEDURE — 99207 ZZC NON-BILLABLE SERV PER CHARTING: CPT | Performed by: FAMILY MEDICINE

## 2020-04-09 ENCOUNTER — MYC REFILL (OUTPATIENT)
Dept: FAMILY MEDICINE | Facility: CLINIC | Age: 25
End: 2020-04-09

## 2020-04-09 DIAGNOSIS — K21.9 GASTROESOPHAGEAL REFLUX DISEASE WITHOUT ESOPHAGITIS: ICD-10-CM

## 2020-04-10 ENCOUNTER — TELEPHONE (OUTPATIENT)
Dept: FAMILY MEDICINE | Facility: CLINIC | Age: 25
End: 2020-04-10

## 2020-04-10 DIAGNOSIS — K21.9 GASTROESOPHAGEAL REFLUX DISEASE, ESOPHAGITIS PRESENCE NOT SPECIFIED: Primary | ICD-10-CM

## 2020-04-10 NOTE — TELEPHONE ENCOUNTER
Routing refill request to provider for review/approval because:  Drug not on the [facility] [place]     Requested Prescriptions   Pending Prescriptions Disp Refills     ranitidine (ZANTAC) 300 MG tablet 30 tablet 11     Sig: Take 1 tablet (300 mg) by mouth At Bedtime       There is no refill protocol information for this order        Last Written Prescription Date: 11/19/18   Last Fill Quantity: 30,  # refills: 11   Last office visit: 1/8/2019 with Dr. Borges, otherwise has had E-visits   Future Office Visit:      Herlinda ESPINOZA, RN, BSN

## 2020-04-10 NOTE — TELEPHONE ENCOUNTER
Reason for Call:  Other prescription    Detailed comments: [facility] is calling and needing the change the following medication:   Disp  Refills  Start  End  THADDEUS    ranitidine (ZANTAC) 300 MG tablet  30 tablet  1  4/10/2020   No    Sig - Route: Take 1 tablet (300 mg) by mouth At Bedtime - Oral    Sent to pharmacy as: ranitidine (ZANTAC) 300 MG tablet    Class: E-Prescribe    Order: [id number]    E-Prescribing Status: Receipt confirmed by pharmacy (4/10/2020 10:49 AM CDT)      Since April 1st not long able to use Zantac can replace with Pepcid      Phone Number Patient can be reached at: Other phone number:  [phone] [facility]    Best Time: any    Can we leave a detailed message on this number? Not Applicable    Call taken on 4/10/2020 at 11:24 AM by Emmy Simons

## 2020-04-13 RX ORDER — FAMOTIDINE 20 MG/1
20 TABLET, FILM COATED ORAL
Qty: 30 TABLET | Refills: 3 | Status: SHIPPED | OUTPATIENT
Start: 2020-04-13 | End: 2021-02-01

## 2020-07-28 ENCOUNTER — VIRTUAL VISIT (OUTPATIENT)
Dept: PEDIATRICS | Facility: CLINIC | Age: 25
End: 2020-07-28
Payer: COMMERCIAL

## 2020-07-28 DIAGNOSIS — S60.562A INSECT BITE OF LEFT HAND, INITIAL ENCOUNTER: Primary | ICD-10-CM
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
## 2020-07-28 DIAGNOSIS — Z91.038 ALLERGIC REACTION TO INSECT BITE: ICD-10-CM

## 2020-07-28 PROCEDURE — 99213 OFFICE O/P EST LOW 20 MIN: CPT | Mod: 95 | Performed by: NURSE PRACTITIONER

## 2020-07-28 RX ORDER — EPINEPHRINE 0.3 MG/.3ML
0.3 INJECTION SUBCUTANEOUS PRN
Qty: 1 EACH | Refills: 1 | Status: SHIPPED | OUTPATIENT
Start: 2020-07-28 | End: 2023-12-20

## 2020-07-28 RX ORDER — HYDROXYZINE HYDROCHLORIDE 25 MG/1
25 TABLET, FILM COATED ORAL 3 TIMES DAILY PRN
Qty: 20 TABLET | Refills: 0 | Status: SHIPPED | OUTPATIENT
Start: 2020-07-28 | End: 2021-02-01

## 2020-07-28 RX ORDER — PREDNISONE 20 MG/1
40 TABLET ORAL DAILY
Qty: 10 TABLET | Refills: 0 | Status: SHIPPED | OUTPATIENT
Start: 2020-07-28 | End: 2020-08-02

## 2020-07-28 NOTE — PROGRESS NOTES
"Fran Staley is a 25 year old male who is being evaluated via a billable video visit.      The patient has been notified of following:     \"This video visit will be conducted via a call between you and your physician/provider. We have found that certain health care needs can be provided without the need for an in-person physical exam.  This service lets us provide the care you need with a video conversation.  If a prescription is necessary we can send it directly to your pharmacy.  If lab work is needed we can place an order for that and you can then stop by our lab to have the test done at a later time.    Video visits are billed at different rates depending on your insurance coverage.  Please reach out to your insurance provider with any questions.    If during the course of the call the physician/provider feels a video visit is not appropriate, you will not be charged for this service.\"    Patient has given verbal consent for Video visit? Yes  How would you like to obtain your AVS? MyChart  If you are dropped from the video visit, the video invite should be resent to: Other e-mail: My chart  Will anyone else be joining your video visit? No      Subjective     Fran Staley is a 25 year old male who presents today via video visit for the following health issues:    HPI    Bee sting left wrist Wasp?       Duration: 7-27-20 8:00pm    Description (location/character/radiation): Swelling, burning, itching in hand and 3 inches above wrist    Intensity:  moderate, severe    Accompanying signs and symptoms: Redness    History (similar episodes/previous evaluation): None    Precipitating or alleviating factors: None    Therapies tried and outcome: Benadryl and tylenol      got bit 8 pm last night but this morning has gotten more swollen and gotten worse through the day   Has been taking Benadryl last night and once this     Video Start Time: 3:52 PM        Patient Active Problem List   Diagnosis     Drug allergy "     Bilateral thoracic back pain     SABINA (generalized anxiety disorder)     Pityriasis rosea     Past Surgical History:   Procedure Laterality Date     none         Social History     Tobacco Use     Smoking status: Never Smoker     Smokeless tobacco: Never Used   Substance Use Topics     Alcohol use: Yes     Comment: once a week     Family History   Problem Relation Age of Onset     Mental Illness Father         ADD     Cerebrovascular Disease Maternal Grandmother         Cale bermudez cancer ? Colon ? Arthritis     Diabetes Maternal Grandfather      Heart Disease Maternal Grandfather      Colon Cancer Maternal Grandfather      Cerebrovascular Disease Paternal Grandfather      Myocardial Infarction Maternal Uncle          Current Outpatient Medications   Medication Sig Dispense Refill     EPINEPHrine (ANY BX GENERIC EQUIV) 0.3 MG/0.3ML injection 2-pack Inject 0.3 mLs (0.3 mg) into the muscle as needed for anaphylaxis 1 each 1     famotidine (PEPCID) 20 MG tablet Take 1 tablet (20 mg) by mouth nightly as needed 30 tablet 3     hydrOXYzine (ATARAX) 25 MG tablet Take 1 tablet (25 mg) by mouth 3 times daily as needed for itching 20 tablet 0     predniSONE (DELTASONE) 20 MG tablet Take 2 tablets (40 mg) by mouth daily for 5 days 10 tablet 0     ranitidine (ZANTAC) 300 MG tablet Take 1 tablet (300 mg) by mouth At Bedtime 30 tablet 1     Allergies   Allergen Reactions     Amoxicillin Hives     Penicillins Hives     Sulfa Drugs      BP Readings from Last 3 Encounters:   09/22/19 127/63   01/08/19 120/76   01/02/19 135/78    Wt Readings from Last 3 Encounters:   09/22/19 88.5 kg (195 lb)   01/08/19 92.5 kg (204 lb)   01/02/19 90.7 kg (200 lb)           Reviewed and updated as needed this visit by Provider         Review of Systems   Constitutional, HEENT, cardiovascular, pulmonary, gi and gu systems are negative, except as otherwise noted.      Objective           Physical Exam     GENERAL: Healthy, alert and no  distress  EYES: Eyes grossly normal to inspection.  No discharge or erythema, or obvious scleral/conjunctival abnormalities.  RESP: No audible wheeze, cough, or visible cyanosis.  No visible retractions or increased work of breathing.    SKIN: Visible skin clear. No significant rash, abnormal pigmentation or lesions.  Left hand and wrist area swollen appearing but pink in color and able to move fingers without difficulty   NEURO: Cranial nerves grossly intact.  Mentation and speech appropriate for age.  PSYCH: Mentation appears normal, affect normal/bright, judgement and insight intact, normal speech and appearance well-groomed.      Diagnostic Test Results:  Labs reviewed in Epic        Assessment & Plan     Fran was seen today for insect bites.    Diagnoses and all orders for this visit:    Insect bite of left hand, initial encounter  -     predniSONE (DELTASONE) 20 MG tablet; Take 2 tablets (40 mg) by mouth daily for 5 days  -     hydrOXYzine (ATARAX) 25 MG tablet; Take 1 tablet (25 mg) by mouth 3 times daily as needed for itching    Allergic reaction to insect bite  -     predniSONE (DELTASONE) 20 MG tablet; Take 2 tablets (40 mg) by mouth daily for 5 days  -     hydrOXYzine (ATARAX) 25 MG tablet; Take 1 tablet (25 mg) by mouth 3 times daily as needed for itching  -     EPINEPHrine (ANY BX GENERIC EQUIV) 0.3 MG/0.3ML injection 2-pack; Inject 0.3 mLs (0.3 mg) into the muscle as needed for anaphylaxis             See Patient Instructions  Patient Instructions     PLAN:   1.   Symptomatic therapy suggested: start on prednisone and hydroxyzine.  Cool compresses as needed   2.  Orders Placed This Encounter   Medications     predniSONE (DELTASONE) 20 MG tablet     Sig: Take 2 tablets (40 mg) by mouth daily for 5 days     Dispense:  10 tablet     Refill:  0     hydrOXYzine (ATARAX) 25 MG tablet     Sig: Take 1 tablet (25 mg) by mouth 3 times daily as needed for itching     Dispense:  20 tablet     Refill:  0      EPINEPHrine (ANY BX GENERIC EQUIV) 0.3 MG/0.3ML injection 2-pack     Sig: Inject 0.3 mLs (0.3 mg) into the muscle as needed for anaphylaxis     Dispense:  1 each     Refill:  1     3. Patient needs to follow up in if no improvement,or sooner if worsening of symptoms or other symptoms develop.      Patient Education     Local Reaction to an Insect Sting   You have been stung or bitten by an insect. The insect s venom or body fluid is causing your skin to react in the area where you were stung or bitten. This often causes redness, itching and swelling. This reaction will fade over a few hours, but it can last a few days. An insect bite or sting can become infected 1 to 3 days later, so watch for the signs below. Sometimes it is hard to tell the difference between a local reaction to the insect bite or sting and an early infection, so you may be given antibiotics.  Common insect stings causing problems are from wasps, bees, yellow jackets, and hornets. Common bites are from spiders, mosquitoes, fleas, or ticks. Other types of insects may be more common in different parts of the country or world.  Most people think of allergic reactions when someone has a rash or itchy skin. Symptoms can include:    Rash, hives, redness, welts, or blisters    Itching, burning, stinging, or pain    Swelling around the sting area.  Sometimes swelling spreads to other areas.  Home care  Medicines  The healthcare provider may prescribe medicines to relieve swelling, itching, and pain. Follow the provider s instructions when taking these medicines.    If you had a severe reaction, the provider may prescribe an epinephrine kit. Epinephrine will stop an allergic reaction from getting worse. Before you leave the hospital, be sure that you understand when and how to use this medicine.    Diphenhydramine is an oral antihistamine available at drugstores and groceries. Unless a prescription antihistamine was given, you can use this medicine to  reduce itching if large areas of the skin are involved. The medicine may make you sleepy, so be careful using it in the daytime or when going to school, working, or driving. Don t use diphenhydramine if you have glaucoma or if you are a man with trouble urinating because of an enlarged prostate. Other antihistamines cause less drowsiness and are good choices for daytime use. Ask your pharmacist for suggestions.    Don t use diphenhydramine cream on your skin. In some people it can cause additional reaction and make you allergic to this medicine.    Calamine lotion or oatmeal baths sometimes help with itching.    You may use acetaminophen or ibuprofen to control pain, unless another pain medicine was prescribed. Talk with your healthcare provider before using these medicines if you have chronic liver or kidney disease. Also talk with your provider if you ve had a stomach ulcer or GI bleeding.  General care      If itching is a problem, don t take hot showers or baths. Stay out of direct sunlight. These heat up your skin and will make the itching worse.    Use an ice pack to reduce local areas of redness and itching. You can make your own ice pack by putting ice cubes in a bag that seals and wrapping it in a thin towel. Don t put the ice directly on your skin, because it can damage the skin.    Try not to scratch any affected areas and damage the skin. This will help prevent an infection.    If oral antibiotics were prescribed, be sure to take them until finished.  Preventing future reactions    Future reactions could be worse than this one, so try to stay away from places where you might be stung again.    Be aware that honeybees nest in trees. Wasps and yellow jackets nest in the ground, trees, or roof eaves.    If you are stung by a honeybee, a stinger will remain in your skin. Wasps, yellow jackets, and hornets don t leave a stinger behind. Move away from the nest area right away. The stinger of a honeybee  releases a substance that will attract other bees to you. Once you are away from the nest, then remove the stinger as quickly as possible.    After any sting, you may apply ice and take diphenhydramine or another antihistamine. If you develop any of the warning signs below, seek help right away.    If you are at high risk for another sting, or if your reaction included dizziness, fainting, or trouble breathing or swallowing, ask your doctor for an insect allergy kit.    Remove any ticks on the skin with a set of fine tweezers.  the tick as close to the skin as possible. Pull back gently but firmly. Use an even, steady pressure. Don t jerk or twist. Don t squeeze, crush, or puncture the body of the tick. The bodily fluids may contain infection-causing germs. Don t use a smoldering match or cigarette, nail polish, petroleum jelly, liquid soap, or kerosene. These may irritate the tick. If any mouthparts of the tick remain in the skin, these should be left alone. They will fall off on their own. Trying to remove these parts may damage the skin unless they can be removed very easily. After the tick is removed, wash the bite area with rubbing alcohol, iodine, or soap and water.  Follow-up care  Follow up with your doctor in 2 days, or as advised, if your symptoms don t start to get better.  Call 911  Call 911 if any of these occur:    Trouble breathing or swallowing, or wheezing    New or worsening swelling in the mouth, throat, or tongue    Hoarse voice or trouble speaking    Confused    Very drowsy or trouble awakening    Fainting or loss of consciousness    Rapid heart rate    Low blood pressure    Feeling of doom    Nausea, vomiting, abdominal pain, or diarrhea    Vomiting blood, or large amounts of blood in stool    Seizure  When to seek medical advice  Call your healthcare provider right away if any of these occur:    Spreading areas of itching, redness or swelling    New or worse swelling in the face, eyelids,  or  lips    Dizziness or weakness  Also call your provider right away if you have signs of infection:    Spreading redness    Increased pain or swelling    Fever of 100.4 F (38 C) or higher, or as directed by your healthcare provider    Colored fluid draining from the sting area   Date Last Reviewed: 10/1/2016    5947-8795 [employer]. [address] [place] [address]. All rights reserved. This information is not intended as a substitute for professional medical care. Always follow your healthcare professional's instructions.               No follow-ups on file.    NAS Fuentes CNP  [facility]      Video-Visit Details    Type of service:  Video Visit    Video End Time:4:01 PM    Originating Location (pt. Location): Home    Distant Location (provider location):  [facility]     Platform used for Video Visit: [employer] on the [facility]    No follow-ups on file.       NAS Fuentes CNP

## 2020-07-28 NOTE — PATIENT INSTRUCTIONS
PLAN:   1.   Symptomatic therapy suggested: start on prednisone and hydroxyzine.  Cool compresses as needed   2.  Orders Placed This Encounter   Medications     predniSONE (DELTASONE) 20 MG tablet     Sig: Take 2 tablets (40 mg) by mouth daily for 5 days     Dispense:  10 tablet     Refill:  0     hydrOXYzine (ATARAX) 25 MG tablet     Sig: Take 1 tablet (25 mg) by mouth 3 times daily as needed for itching     Dispense:  20 tablet     Refill:  0     EPINEPHrine (ANY BX GENERIC EQUIV) 0.3 MG/0.3ML injection 2-pack     Sig: Inject 0.3 mLs (0.3 mg) into the muscle as needed for anaphylaxis     Dispense:  1 each     Refill:  1     3. Patient needs to follow up in if no improvement,or sooner if worsening of symptoms or other symptoms develop.      Patient Education     Local Reaction to an Insect Sting   You have been stung or bitten by an insect. The insect s venom or body fluid is causing your skin to react in the area where you were stung or bitten. This often causes redness, itching and swelling. This reaction will fade over a few hours, but it can last a few days. An insect bite or sting can become infected 1 to 3 days later, so watch for the signs below. Sometimes it is hard to tell the difference between a local reaction to the insect bite or sting and an early infection, so you may be given antibiotics.  Common insect stings causing problems are from wasps, bees, yellow jackets, and hornets. Common bites are from spiders, mosquitoes, fleas, or ticks. Other types of insects may be more common in different parts of the country or world.  Most people think of allergic reactions when someone has a rash or itchy skin. Symptoms can include:    Rash, hives, redness, welts, or blisters    Itching, burning, stinging, or pain    Swelling around the sting area.  Sometimes swelling spreads to other areas.  Home care  Medicines  The healthcare provider may prescribe medicines to relieve swelling, itching, and pain. Follow the  provider s instructions when taking these medicines.    If you had a severe reaction, the provider may prescribe an epinephrine kit. Epinephrine will stop an allergic reaction from getting worse. Before you leave the hospital, be sure that you understand when and how to use this medicine.    Diphenhydramine is an oral antihistamine available at drugstores and groceries. Unless a prescription antihistamine was given, you can use this medicine to reduce itching if large areas of the skin are involved. The medicine may make you sleepy, so be careful using it in the daytime or when going to school, working, or driving. Don t use diphenhydramine if you have glaucoma or if you are a man with trouble urinating because of an enlarged prostate. Other antihistamines cause less drowsiness and are good choices for daytime use. Ask your pharmacist for suggestions.    Don t use diphenhydramine cream on your skin. In some people it can cause additional reaction and make you allergic to this medicine.    Calamine lotion or oatmeal baths sometimes help with itching.    You may use acetaminophen or ibuprofen to control pain, unless another pain medicine was prescribed. Talk with your healthcare provider before using these medicines if you have chronic liver or kidney disease. Also talk with your provider if you ve had a stomach ulcer or GI bleeding.  General care      If itching is a problem, don t take hot showers or baths. Stay out of direct sunlight. These heat up your skin and will make the itching worse.    Use an ice pack to reduce local areas of redness and itching. You can make your own ice pack by putting ice cubes in a bag that seals and wrapping it in a thin towel. Don t put the ice directly on your skin, because it can damage the skin.    Try not to scratch any affected areas and damage the skin. This will help prevent an infection.    If oral antibiotics were prescribed, be sure to take them until finished.  Preventing  future reactions    Future reactions could be worse than this one, so try to stay away from places where you might be stung again.    Be aware that honeybees nest in trees. Wasps and yellow jackets nest in the ground, trees, or roof eaves.    If you are stung by a honeybee, a stinger will remain in your skin. Wasps, yellow jackets, and hornets don t leave a stinger behind. Move away from the nest area right away. The stinger of a honeybee releases a substance that will attract other bees to you. Once you are away from the nest, then remove the stinger as quickly as possible.    After any sting, you may apply ice and take diphenhydramine or another antihistamine. If you develop any of the warning signs below, seek help right away.    If you are at high risk for another sting, or if your reaction included dizziness, fainting, or trouble breathing or swallowing, ask your doctor for an insect allergy kit.    Remove any ticks on the skin with a set of fine tweezers.  the tick as close to the skin as possible. Pull back gently but firmly. Use an even, steady pressure. Don t jerk or twist. Don t squeeze, crush, or puncture the body of the tick. The bodily fluids may contain infection-causing germs. Don t use a smoldering match or cigarette, nail polish, petroleum jelly, liquid soap, or kerosene. These may irritate the tick. If any mouthparts of the tick remain in the skin, these should be left alone. They will fall off on their own. Trying to remove these parts may damage the skin unless they can be removed very easily. After the tick is removed, wash the bite area with rubbing alcohol, iodine, or soap and water.  Follow-up care  Follow up with your doctor in 2 days, or as advised, if your symptoms don t start to get better.  Call 911  Call 911 if any of these occur:    Trouble breathing or swallowing, or wheezing    New or worsening swelling in the mouth, throat, or tongue    Hoarse voice or trouble  speaking    Confused    Very drowsy or trouble awakening    Fainting or loss of consciousness    Rapid heart rate    Low blood pressure    Feeling of doom    Nausea, vomiting, abdominal pain, or diarrhea    Vomiting blood, or large amounts of blood in stool    Seizure  When to seek medical advice  Call your healthcare provider right away if any of these occur:    Spreading areas of itching, redness or swelling    New or worse swelling in the face, eyelids, or  lips    Dizziness or weakness  Also call your provider right away if you have signs of infection:    Spreading redness    Increased pain or swelling    Fever of 100.4 F (38 C) or higher, or as directed by your healthcare provider    Colored fluid draining from the sting area   Date Last Reviewed: 10/1/2016    4532-7177 [employer]. [address] [place] [address], [place] [address]. All rights reserved. This information is not intended as a substitute for professional medical care. Always follow your healthcare professional's instructions.

## 2020-09-26 NOTE — PROGRESS NOTES
SUBJECTIVE:   Fran Staley is a 23 year old male who presents to clinic today for the following health issues:  Chief Complaint   Patient presents with     Anxiety     Heartburn     lots of heart burn      He would like to be back on citalopram.    Anxiety Follow-Up    Status since last visit: No change    Other associated symptoms:None    Complicating factors:   Significant life event: No   Current substance abuse: None  Depression symptoms: No  SABINA-7 SCORE 3/12/2018 11/28/2018   Total Score - 17 (severe anxiety)   Total Score 12 17     Pt has been off of medication, hard to fall asleep on taking celexa - interfered with his job  He had been on fluoxetine until March, 2018.  Took for about 3 months.  Had weight gain.  Was not sleeping well.   In March, 2018 started sertraline.  Again had difficulty sleeping-getting to sleep.    Was prescribed but he did not take citalopram.   Seemed like all the medications made it difficult to sleep.  Did help the panic attacks some.   Does get panic attacks daily.  Can happen up to 3 times a day.  Triggering factors:?  Can happen driving or at work, could happen at home.   Some anxiety much of the time but the panic attacks are what bother him the most.     He has had anxiety since childhood.  Had panic attacks.  Was better for a while in high school but worse again after graduation.   No past history of trauma.    Has not been in counseling in the past.  Was not on medication as a child.      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     Sleeping is usually good.  Sleeps more now in the winter.     SABINA-7    PHQ-9 ([employer]) 11/29/2018   1.  Little interest or pleasure in doing things 0   2.  Feeling down, depressed, or hopeless 0   3.  Trouble falling or staying asleep, or sleeping too much 1   4.  Feeling tired or having little energy 0   5.  Poor appetite or overeating 1   6.  Feeling bad about yourself 0   7.  Trouble concentrating 0   8.  Moving slowly or  RIGHT LE NWB   STG:  (1.)Ms. Andi Ibanez will move from supine to sit and sit to supine , scoot up and down, and roll side to side with MINIMAL ASSIST within 4 treatment day(s). (2.)Ms. Andi Ibanez will transfer from bed to chair and chair to bed with MINIMAL ASSIST using the least restrictive device within 4 treatment day(s). (3.)Ms. Andi Ibanez will ambulate with MINIMAL ASSIST for 10 feet with the least restrictive device within 4 treatment day(s). LTG:  (1.)Ms. Andi Ibanez will move from supine to sit and sit to supine , scoot up and down, and roll side to side in bed with CONTACT GUARD ASSIST within 7 treatment day(s). (2.)Ms. Andi Ibanez will transfer from bed to chair and chair to bed with CONTACT GUARD ASSIST using the least restrictive device within 7 treatment day(s). (3.)Ms. Andi Ibanez will ambulate with CONTACT GUARD ASSIST for 20 feet with the least restrictive device within 7 treatment day(s).   ________________________________________________________________________________________________      PHYSICAL THERAPY: Initial Assessment and AM 9/26/2020  INPATIENT: PT Visit Days : 1  Payor: Triny Devine / Plan: [address] / Product Type: [employer] /       NAME/AGE/GENDER: Checo Dodson is a 62 y.o. female   PRIMARY DIAGNOSIS: Other fracture of right femur, initial encounter for closed fracture ([address] [S72.8X1A] Other fracture of right femur, initial encounter for closed fracture ([address] Other fracture of right femur, initial encounter for closed fracture ([address]  Procedure(s) (LRB):  Right FEMUR OPEN REDUCTION INTERNAL FIXATION (Right)  Right TIBIAL PLATEAU OPEN REDUCTION INTERNAL FIXATION (Right)  1 Day Post-Op  ICD-10: Treatment Diagnosis:    Generalized Muscle Weakness (M62.81)  Other lack of cordination (R27.8)  Difficulty in walking, Not elsewhere classified (R26.2)  Other abnormalities of gait and mobility (R26.89)   Precaution/Allergies:  Codeine and Lortab [hydrocodone-acetaminophen]      ASSESSMENT:     Ms. Tam Mcarthur      is a pleasant, but reluctant disabled female presenting with above diagnosis who demonstrates with decreased transfers, ambulation and mobility below her prior functional baseline. She initially refused PT secondary to \"pain\", but then was agreeable to EOB only after PT discussion with [facility]. All transfers are currently limited at MOD A x 1 out of bed to sit with fair balance dangling. Therapeutic exercise is performed in seated for bilateral LE's. She is unwilling to stand at this time or slide assist to the bedside chair. [place] then returns to supine with MOD A x 1. Skilled PT is indicated for this patient's functional mobility deficits. Patient requires cues to perform exercises correctly. Overall good progress towards physical therapy goals. Goals listed above are appropriate. Will continue efforts as patient is still below functional baseline. At this time, patient is appropriate for Co-treatment with occupational therapy when available due to patient's decreased overall endurance/tolerance levels, as well as need for high level skilled assistance to complete functional transfers/mobility and functional tasks. [place] is appropriate for a multidisciplinary co-treatment of PT and OT to address goals of both disciplines.     This section established at most recent assessment   PROBLEM LIST (Impairments causing functional limitations):  Decreased Strength  Decreased ADL/Functional Activities  Decreased Transfer Abilities  Decreased Ambulation Ability/Technique  Decreased Balance  Increased Pain  Decreased Activity Tolerance  Decreased Pacing Skills   INTERVENTIONS PLANNED: (Benefits and precautions of physical therapy have been discussed with the patient.)  Balance Exercise  Bed Mobility  Home Exercise Program (HEP)  Range of Motion (ROM)  Therapeutic Activites  Therapeutic Exercise/Strengthening  Transfer Training restless 0   9.  Suicidal or self-harm thoughts 0   PHQ-9 Total Score 2   1.  Little interest or pleasure in doing things Not at all   2.  Feeling down, depressed, or hopeless Not at all   3.  Trouble falling or staying asleep, or sleeping too much Several days   4.  Feeling tired or having little energy Not at all   5.  Poor appetite or overeating Several days   6.  Feeling bad about yourself Not at all   7.  Trouble concentrating Not at all   8.  Moving slowly or restless Not at all   9.  Suicidal or self-harm thoughts Not at all   PHQ-9 via [place] TOTAL SCORE-----> 2 (Minimal depression)   Difficulty at work, home, or with people Not difficult at all       GERD/Heartburn      Duration: long time    Description (location/character/radiation): burning in chest and throat    Intensity:  moderate    Accompanying signs and symptoms:  food getting stuck: no   nausea/vomiting/blood: no   abdominal pain: YES- epi gastric pain  black/tarry or bloody stools: no :    History (similar episodes/previous evaluation): has been taking ranitadine OTC - doesn't seem to help    Precipitating or alleviating factors:  worse with spicy foods and energy drinks.  current NSAID/Aspirin use: no     Therapies tried and outcome: Zantac (Ranitidine)    Problem 2:   Heartburn for years.   He has been on ranitidine in the past.  Takes one 150mg daily. It seems to work well.  Would like prescription.   Caffeine 3 cups a day.  occasional ibuprofen.   Alcohol-occasional.    Patient Active Problem List   Diagnosis     Drug allergy     Bilateral thoracic back pain     SABINA (generalized anxiety disorder)      ROS:General: POSITIVE for:, weight gain, 17# in the past year.  Energy OK.   Resp: No coughing, wheezing or shortness of breath  CV: No chest pains or palpitations  GI: POSITIVE for:, heartburn or reflux, Negative for change in bowel habits, constipation, diarrhea  : No urinary frequency or dysuria, bladder or kidney  TREATMENT PLAN: Frequency/Duration: up to twice daily for duration of hospital stay  Rehabilitation Potential For Stated Goals: [facility] (at time of discharge pending progress):    Placement: It is my opinion, based on this patient's performance to date, that Ms. Jennifer Ding may benefit from intensive therapy at a [facility] after discharge due to the functional deficits listed above that are likely to improve with skilled rehabilitation and concerns that he/she may be unsafe to be unsupervised at home due to balance and stability issues. .  Equipment:   None at this time              HISTORY:   History of Present Injury/Illness (Reason for Referral):  PER MD H&P   Charlawamary Rausch is a 62 y.o. female who apparently fell there is some question on whether or not her  also maybe fell on her and injured her right knee. She has a history she says of having bad knees with arthritis. She does not know that she has had a previous fracture. Her pain x-rays and CT scan have revealed distal femur and proximal tibial plateau fractures of the right knee. She does have quite a bit of pain in her right knee. She denies other problems besides her right knee     Past Medical History/Comorbidities:   Ms. Jennifer Ding  has a past medical history of Aneurysm of common carotid artery ([facility] [address] (2004), CKD (chronic kidney disease) (9/18/2014), Dementia ([address] FSGS (focal segmental glomerulosclerosis), Gout, Hypertension, Osteoarthritis (9/18/2014), and Stroke ([address] (2004, 2013). Ms. Jennifer Ding  has a past surgical history that includes hx intracranial aneurysm repair (2004); hx refractive surgery; hx orthopaedic (Right, 10/2014); hx ankle fracture tx (Left, 2013); hx ercp (02/27/2018); and hx cholecystectomy (02/28/2018).   Social History/Living Environment:   Home Environment: Private residence  One/Two Story Residence: One story  Living Alone: No  Support Systems: Spouse/Significant problems  Musculoskeletal: No significant muscle or joint pains  Neurologic: No headaches, numbness, tingling, weakness, problems with balance or coordination    OBJECTIVE:Blood pressure 126/80, pulse 72, temperature 97.9  F (36.6  C), temperature source Tympanic, resp. rate 12, weight 199 lb 3.2 oz (90.4 kg), SpO2 98 %. BMI=Body mass index is 29.42 kg/(m^2).  GENERAL APPEARANCE ADULT: Alert, no acute distress  PSYCH: mentation appears normal., affect and mood normal     ASSESSMENT:   (F41.1) SABINA (generalized anxiety disorder)  (primary encounter diagnosis)  Comment: chronic anxiety and panic attacks.   Plan: venlafaxine (EFFEXOR-XR) 37.5 MG 24 hr capsule        I discussed options for medication for anxiety and panic attacks.  Another option would be counseling, help with managing stress and possibly learning relaxation exercises.  Regular exercise could also be helpful.  SSRI medications work well for longer term use to prevent anxiety and panic attacks.  Reviewed potential side effects.   Start venlafaxine (Effexor) starting with 37.5mg once daily for two weeks, then 2 pills (75mg) once daily.  If intolerable side effects or additional problems, notify me.    Recheck in a month to reevaluate, call earlier with significant side effects.     (K21.9) Gastroesophageal reflux disease without esophagitis  Comment:   Plan: ranitidine (ZANTAC) 300 MG tablet        Take the ranitidine 300mg once daily.   Gastroesophageal reflux (GERD) lifestyle changes that can help improve symptoms include:  Eating smaller meals and not lying down after meals  Elevate head of bed with 6-8 inch blocks or a wedge pillow.   Avoid lying flat on back after eating and at bedtime  Avoid tight fitting clothing  Avoid reflux inducing foods like fat, caffeine, chocolate, carbonated beverages  Maintain an ideal body weight  Avoid alcohol and tobacco  Avoid medications like ibuprofen, naproxen and aspirin    (Z23) Need for prophylactic vaccination  Other/Partner  Patient Expects to be Discharged to[de-identified] Rehabilitation facility  Current DME Used/Available at Home: Commode, bedside, Grab bars, Shower chair  Tub or Shower Type: Tub/Shower combination  Prior Level of Function/Work/Activity:  Limited by prior comorbidities. Dominant Side:         RIGHT    Personal Factors:          Sex:  female        Age:  62 y.o. Number of Personal Factors/Comorbidities that affect the Plan of Care: 1-2: MODERATE COMPLEXITY   EXAMINATION:   Most Recent Physical Functioning:   Gross Assessment:  AROM: Generally decreased, functional(NWB right LE with a KI)  Strength: Generally decreased, functional  Coordination: Generally decreased, functional               Posture:  Posture (WDL): Exceptions to WDL  Posture Assessment: Cervical, Forward head  Balance:  Sitting: Impaired  Sitting - Static: Poor (constant support)  Sitting - Dynamic: Poor (constant support)  Standing: Impaired  Standing - Static: Not tested(unwilling to stand today . Estimated MAX A x 2 )  Standing - Dynamic : Not tested Bed Mobility:  Rolling: Moderate assistance  Supine to Sit: Moderate assistance  Sit to Supine: Moderate assistance  Scooting: Moderate assistance  Wheelchair Mobility:     Transfers:  Sit to Stand: (would not attempt .   Per OT note MAX A x 1 NWB right )  Gait:            Body Structures Involved:  Bones  Joints  Muscles  Ligaments Body Functions Affected:  Neuromusculoskeletal  Movement Related  Skin Related  Metobolic/Endocrine Activities and Participation Affected:  General Tasks and Demands  Communication  Mobility  Self Care  Domestic Life  Community, Social and [place]   Number of elements that affect the Plan of Care: 3: MODERATE COMPLEXITY   CLINICAL PRESENTATION:   Presentation: Evolving clinical presentation with changing clinical characteristics: MODERATE COMPLEXITY   CLINICAL DECISION MAKIN [facility] Short Form  How much and inoculation against influenza  Comment:   Plan: INTRANASAL FLU VACCINE, QUADRIVALENT LIVE         (FluMist) [98542]- 2-49 YRS, Vaccine         Administration, Nasal/Oral [82389]        Flu shot today.            INTRANASAL INFLUENZA IMMUNIZATION DOCUMENTATION    1. Is the person to be vaccinated sick today? No    2. Does the person to be vaccinated have an allergy to a component of the influenza vaccine? No    3. Has the person to be vaccinated ever had a serious reaction to influenza vaccine in the past? No    4. Is the person to be vaccinated younger than age 2  years or older than age 49 years? No    5. Does the person to be vaccinated have a long-term health problem with heart disease, lung disease (including asthma), kidney disease, neurologic disease, liver disease, disease (e.g., diabetes), or anemia or another blood disorder? No    6.  If the person to be vaccinated is a child age 2 through 4 years, in the past 12 months, has a health care provider told you the child had wheezing or asthma? No    7. Does the person to be vaccinated have cancer, leukemia, HIV/AIDS, or any other immune system problem; or, in the past 3 months, have they taken medications that affect the immune system, such as prednisone, other steroids, drugs for the treatment of rheumatoid arthritis, Crohn s disease, or psoriasis or anticancer drugs; or have they had radiation treatments? No    8. Is the person to be vaccinated receiving influenza antiviral medications? No    9. Is the person to be vaccinated a child or teen age 2 through 17 years and receiving aspirin therapy or aspirin-containing therapy? No    10. Is the person to be vaccinated pregnant or could she become pregnant within the next month? No    11. Has the person to be vaccinated ever had Guillain-Barré syndrome? No    12. Does the person to be vaccinated live with or expect to have close contact with a person whose immune system is severely compromised and who must be  difficulty does the patient currently have. .. Unable A Lot A Little None   1. Turning over in bed (including adjusting bedclothes, sheets and blankets)? [] 1   [x] 2   [] 3   [] 4   2. Sitting down on and standing up from a chair with arms ( e.g., wheelchair, bedside commode, etc.)   [x] 1   [] 2   [] 3   [] 4   3. Moving from lying on back to sitting on the side of the bed? [] 1   [x] 2   [] 3   [] 4   How much help from another person does the patient currently need. .. Total A Lot A Little None   4. Moving to and from a bed to a chair (including a wheelchair)? [x] 1   [] 2   [] 3   [] 4   5. Need to walk in hospital room? [x] 1   [] 2   [] 3   [] 4   6. Climbing 3-5 steps with a railing? [x] 1   [] 2   [] 3   [] 4   © 2007, Trustees of [address] [place] [address], under license to [email or web address]. All rights reserved      Score:  Initial: 8 Most Recent: X (Date: -- )    Interpretation of Tool:  Represents activities that are increasingly more difficult (i.e. Bed mobility, Transfers, Gait). Medical Necessity:     Patient demonstrates   fair   rehab potential due to higher previous functional level. Reason for Services/Other Comments:  Patient continues to require skilled intervention due to   medical complications, patient unable to attend/participate in therapy as expected, and right LE NWB and prior comorbidities and debility. .   Use of outcome tool(s) and clinical judgement create a POC that gives a: Questionable prediction of patient's progress: MODERATE COMPLEXITY            TREATMENT:   (In addition to Assessment/Re-Assessment sessions the following treatments were rendered)   Pre-treatment Symptoms/Complaints:  7/10 in the LE's. Pain: Initial:   Pain Intensity 1: 7  Pain Location 1: Leg  Pain Intervention(s) 1: Repositioned  Post Session:  7/10 in the right LE. NONTIMED EVAL MODERATE COMPLEX   Therapeutic Activity: (    15 mins):   Therapeutic activities including Bed transfers and in protective isolation (e.g., an isolation room of a bone marrow transplant unit)? YES - grandfather has cancer but won't be seeing him until katelin 4 weeks    13. Has the person to be vaccinated received any other vaccinations in the past 4 weeks? No     seated dangling to improve mobility, strength, balance, and coordination. Required minimal   to promote coordination of right, lower extremity(s). Therapeutic Exercise: ( 8 mins):  Exercises per grid below to improve mobility, strength, balance, and coordination. Required minimal visual, verbal, manual, and tactile cues to promote proper body alignment, promote proper body posture, and promote proper body mechanics. Progressed repetitions as indicated. Date:  9/26  Date:   Date:     Activity/Exercise Parameters Parameters Parameters   AP's  10 x's      HIP ABD  10 x's      Quad sets  10 x's      Glute sets  10 x's      Left LE LAQ's.    10 x's                    Braces/Orthotics/Lines/Etc:   O2 Device: Nasal cannula  Treatment/Session Assessment:    Response to Treatment:  limited mobility per patient toleration and agreement to EOB only today. Interdisciplinary Collaboration:   Physical Therapist  Registered Nurse  RUPAL CRUZ  After treatment position/precautions:   Supine in bed  Bed alarm/tab alert on  Bed/Chair-wheels locked  Bed in low position  Call light within reach  RN notified  Side rails x 3   Compliance with Program/Exercises: Will assess as treatment progresses  Recommendations/Intent for next treatment session: \"Next visit will focus on advancements to more challenging activities, reduction in assistance provided, and OOB pivot or slide per patient toleration. \".   Total Treatment Duration:  PT Patient Time In/Time Out  Time In: 1131  Time Out: [address] [place]

## 2020-12-22 ENCOUNTER — HOSPITAL ENCOUNTER (OUTPATIENT)
Dept: BEHAVIORAL HEALTH | Facility: CLINIC | Age: 25
End: 2020-12-22
Attending: PSYCHIATRY & NEUROLOGY
Payer: COMMERCIAL

## 2020-12-22 DIAGNOSIS — F41.1 GAD (GENERALIZED ANXIETY DISORDER): Primary | ICD-10-CM

## 2020-12-22 PROCEDURE — 999N000216 HC STATISTIC ADULT CD FACE TO FACE-NO CHRG: Mod: GT | Performed by: SOCIAL WORKER

## 2020-12-22 NOTE — PROGRESS NOTES
Client was requesting psychological testing for ADHD. Order sent to intake. Informed client he would be contacted within the next 24 hours to assist with scheduling this appointment.

## 2020-12-22 NOTE — ADDENDUM NOTE
Encounter addended by: Radha Nick LICSW on: 12/22/2020 11:46 AM   Actions taken: Clinical Note Signed

## 2020-12-24 NOTE — ADDENDUM NOTE
Encounter addended by: Radha Nick LICSW on: 12/24/2020 10:47 AM   Actions taken: Charge Capture section accepted

## 2021-01-29 ENCOUNTER — VIRTUAL VISIT (OUTPATIENT)
Dept: FAMILY MEDICINE | Facility: CLINIC | Age: 26
End: 2021-01-29
Payer: COMMERCIAL

## 2021-01-29 DIAGNOSIS — F41.1 GAD (GENERALIZED ANXIETY DISORDER): ICD-10-CM

## 2021-01-29 DIAGNOSIS — L03.213 PRESEPTAL CELLULITIS OF LEFT EYE: Primary | ICD-10-CM

## 2021-01-29 PROCEDURE — 99214 OFFICE O/P EST MOD 30 MIN: CPT | Mod: 95 | Performed by: PHYSICIAN ASSISTANT

## 2021-01-29 RX ORDER — CEFDINIR 300 MG/1
300 CAPSULE ORAL 2 TIMES DAILY
Qty: 14 CAPSULE | Refills: 0 | Status: SHIPPED | OUTPATIENT
Start: 2021-01-29 | End: 2021-02-05

## 2021-01-29 RX ORDER — BUSPIRONE HYDROCHLORIDE 15 MG/1
TABLET ORAL
Qty: 90 TABLET | Refills: 11 | Status: SHIPPED | OUTPATIENT
Start: 2021-01-29 | End: 2021-08-31

## 2021-01-29 RX ORDER — CLINDAMYCIN HCL 300 MG
300 CAPSULE ORAL 3 TIMES DAILY
Qty: 21 CAPSULE | Refills: 0 | Status: SHIPPED | OUTPATIENT
Start: 2021-01-29 | End: 2021-02-05

## 2021-01-29 NOTE — PROGRESS NOTES
"Fran is a 25 year old who is being evaluated via a billable video visit.      How would you like to obtain your AVS? MyChart  If the video visit is dropped, the invitation should be resent by: Text to cell phone: [phone]  Will anyone else be joining your video visit? No    Video Start Time: 12:57 PM  Assessment & Plan     Preseptal cellulitis of left eye  - clindamycin (CLEOCIN) 300 MG capsule; Take 1 capsule (300 mg) by mouth 3 times daily for 7 days  - cefdinir (OMNICEF) 300 MG capsule; Take 1 capsule (300 mg) by mouth 2 times daily for 7 days    SABINA (generalized anxiety disorder)  - busPIRone (BUSPAR) 15 MG tablet; Increase if anxiety not doing well: 0.5 tab 2x/day x 3 days, then 1 tab 2x/day x 3 days, then 1.5 tab 2x/day x 3 days, then 2 tab 2x/day.    Patient Instructions   Expect stable or improving in first 48 hrs - if worsening be seen.    If not improving after 48 hrs be seen    Be seen if pain with eye movement, fevers, more dramatic swelling      Return if symptoms worsen or fail to improve.    WILTON [facility]    Subjective     Fran is a 25 year old who presents to clinic today for the following health issues    HPI       Concern - Swollen Eyelid-left  Onset: 5 days.   Description: Went on vacation, started hurting like a sty.  Kept swelling up and is now totally swollen and eye is shut.  Pain, redness and warm to the touch  Intensity: severe  Progression of Symptoms:  Worsening    No injury or bug bite.  Felt like stype on Mon.  Burning on Tues.  Started swelling on Wed, worsened a lot yesterday to being fully swollen.  Red, warm to the touch.  No pain with eye movement.  No injection per fiance.  Slight crusting when he awakened, otherwise no discharge. No vision change aside from the \"curtain effect\" of swollen lid.    Anxiety - not doing well.  Tried to have psychiatry appt few wks ago.  Side effects with previous meds.          Objective  "          Vitals:  No vitals were obtained today due to virtual visit.    Physical Exam   GENERAL: Healthy, alert and no distress  EYES: L upper eyelid appears moderately swollen; video quality limiting remainder of exam  RESP: No audible wheeze, cough, or visible cyanosis.  No visible retractions or increased work of breathing.    SKIN: Visible skin clear. No significant rash, abnormal pigmentation or lesions.  NEURO: Cranial nerves grossly intact.  Mentation and speech appropriate for age.  PSYCH: Mentation appears normal, affect normal/bright, judgement and insight intact, normal speech and appearance well-groomed.          Video-Visit Details    Type of service:  Video Visit    Video End Time:1:12    Originating Location (pt. Location): Home    Distant Location (provider location):  [facility]     Platform used for Video Visit: [employer]

## 2021-02-01 NOTE — PATIENT INSTRUCTIONS
Expect stable or improving in first 48 hrs - if worsening be seen.    If not improving after 48 hrs be seen    Be seen if pain with eye movement, fevers, more dramatic swelling

## 2021-03-22 ENCOUNTER — VIRTUAL VISIT (OUTPATIENT)
Dept: FAMILY MEDICINE | Facility: CLINIC | Age: 26
End: 2021-03-22
Payer: COMMERCIAL

## 2021-03-22 DIAGNOSIS — L98.9 SKIN LESION: Primary | ICD-10-CM

## 2021-03-22 PROCEDURE — 99213 OFFICE O/P EST LOW 20 MIN: CPT | Mod: 95 | Performed by: NURSE PRACTITIONER

## 2021-03-22 NOTE — PROGRESS NOTES
Fran is a 25 year old who is being evaluated via a billable video visit.      How would you like to obtain your AVS? MyChart  If the video visit is dropped, the invitation should be resent by: Text to cell phone: [phone]  Will anyone else be joining your video visit? No    Video Start Time: 11:38 AM    Assessment & Plan     Skin lesion  Patient would like referral to dermatology of skin lesion removal   - DERMATOLOGY ADULT REFERRAL       See Patient Instructions    No follow-ups on file.    NAS Robbins CNP  [facility]    Subjective   Fran is a 25 year old who presents for the following health issues     HPI     Patient has a growth on the right side of his neck that he would like to discuss getting removed. It has been there his whole life but has recently become raised.         Review of Systems   Constitutional, HEENT, cardiovascular, pulmonary, gi and gu systems are negative, except as otherwise noted.      Objective           Vitals:  No vitals were obtained today due to virtual visit.    Physical Exam   GENERAL: Healthy, alert and no distress  EYES: Eyes grossly normal to inspection.  No discharge or erythema, or obvious scleral/conjunctival abnormalities.  RESP: No audible wheeze, cough, or visible cyanosis.  No visible retractions or increased work of breathing.    SKIN: Visible skin clear. No significant rash, abnormal pigmentation or lesions.  NEURO: Cranial nerves grossly intact.  Mentation and speech appropriate for age.  PSYCH: Mentation appears normal, affect normal/bright, judgement and insight intact, normal speech and appearance well-groomed.  Skin: lesion to upper right neck limited assessment due to vitral visit         Video-Visit Details    Type of service:  Video Visit    Video End Time: 11:42   Originating Location (pt. Location): Home    Distant Location (provider location):  [facility]     Platform used for Video  Visit: Velma

## 2021-04-02 ENCOUNTER — E-VISIT (OUTPATIENT)
Dept: FAMILY MEDICINE | Facility: CLINIC | Age: 26
End: 2021-04-02
Payer: COMMERCIAL

## 2021-04-02 DIAGNOSIS — R06.02 SOB (SHORTNESS OF BREATH): ICD-10-CM

## 2021-04-02 DIAGNOSIS — U07.1 INFECTION DUE TO 2019 NOVEL CORONAVIRUS: Primary | ICD-10-CM

## 2021-04-02 PROCEDURE — 99207 PR NO CHARGE LOS: CPT | Performed by: PHYSICIAN ASSISTANT

## 2021-04-04 ENCOUNTER — HOSPITAL ENCOUNTER (EMERGENCY)
Facility: CLINIC | Age: 26
Discharge: HOME | End: 2021-04-04
Attending: EMERGENCY MEDICINE | Admitting: EMERGENCY MEDICINE
Payer: COMMERCIAL

## 2021-04-04 ENCOUNTER — NURSE TRIAGE (OUTPATIENT)
Dept: NURSING | Facility: CLINIC | Age: 26
End: 2021-04-04

## 2021-04-04 VITALS
HEIGHT: 68 IN | TEMPERATURE: 99.1 F | BODY MASS INDEX: 31.07 KG/M2 | OXYGEN SATURATION: 100 % | HEART RATE: 64 BPM | DIASTOLIC BLOOD PRESSURE: 85 MMHG | RESPIRATION RATE: 18 BRPM | SYSTOLIC BLOOD PRESSURE: 126 MMHG | WEIGHT: 205 LBS

## 2021-04-04 DIAGNOSIS — R06.00 DYSPNEA, UNSPECIFIED TYPE: ICD-10-CM

## 2021-04-04 DIAGNOSIS — U07.1 2019 NOVEL CORONAVIRUS DISEASE (COVID-19): ICD-10-CM

## 2021-04-04 PROCEDURE — 99282 EMERGENCY DEPT VISIT SF MDM: CPT | Performed by: EMERGENCY MEDICINE

## 2021-04-04 ASSESSMENT — MIFFLIN-ST. JEOR: SCORE: 1894.37

## 2021-04-05 NOTE — ED PROVIDER NOTES
History     Chief Complaint   Patient presents with     Shortness of Breath     Patient tested COVID + 4/23. Only had HA and fever for 2 days. ~ 3 days of SOB.     HPI  Fran Staley is a 25 year old male with history of SarsCoV2 infection on 3/23 with symptoms onset two days prior. No fever since 25th (5 days prior). For the past four days he has had intermittent dyspnea and chest discomfort. It is worse in the evening and after eating. Was able to run outside earlier today flying a kite without any symptoms. No lower extremity edema, erythema or tenderness. Non smoker. No personal or family history of VTE.     Recently started buspar for anxiety. Was making him dizzy and stopped after on week. Started taking again recently.     SpO2 97% and speaking in full sentences. Clear lungs no murmurs.       The patient's PMHx, Surgical Hx, Allergies, and Medications were all reviewed with the patient.    Allergies:  Allergies   Allergen Reactions     Amoxicillin Hives     Penicillins Hives     Sulfa Drugs        Problem List:    Patient Active Problem List    Diagnosis Date Noted     Pityriasis rosea 01/08/2019     Priority: Medium     SABINA (generalized anxiety disorder) 10/17/2017     Priority: Medium     Bilateral thoracic back pain 07/26/2017     Priority: Medium     Drug allergy 03/20/2012     Priority: Medium     March 20, 2012 hives from amoxicillin              Past Medical History:    No past medical history on file.    Past Surgical History:    Past Surgical History:   Procedure Laterality Date     none         Family History:    Family History   Problem Relation Age of Onset     Mental Illness Father         ADD     Hypertension Father      Hyperlipidemia Father      Cerebrovascular Disease Maternal Grandmother         Cale bermudez cancer ? Colon ? Arthritis     Other Cancer Maternal Grandmother      Diabetes Maternal Grandfather      Heart Disease Maternal Grandfather      Colon Cancer Maternal Grandfather   "    Cerebrovascular Disease Paternal Grandfather      Myocardial Infarction Maternal Uncle        Social History:  Marital Status:  Single [1]  Social History     Tobacco Use     Smoking status: Never Smoker     Smokeless tobacco: Never Used   Substance Use Topics     Alcohol use: Yes     Comment: Sometimes when going out with freinds     Drug use: Never        Medications:    busPIRone (BUSPAR) 15 MG tablet  EPINEPHrine (ANY BX GENERIC EQUIV) 0.3 MG/0.3ML injection 2-pack          Review of Systems   Constitutional: Negative for chills and fever.   HENT: Negative for congestion.    Eyes: Negative for visual disturbance.   Respiratory: Positive for shortness of breath.    Cardiovascular: Negative for leg swelling.   Gastrointestinal: Negative for abdominal pain, diarrhea, nausea and vomiting.   Genitourinary: Negative for difficulty urinating.   Musculoskeletal: Negative for myalgias.   Skin: Negative for color change.   Neurological: Negative for syncope, light-headedness and headaches.       Physical Exam   BP: (!) 146/93  Pulse: 98  Temp: 99.1  F (37.3  C)  Resp: 18  Height: 172.7 cm (5' 8\")  Weight: 93 kg (205 lb)  SpO2: 98 %    Physical Exam  GEN: Awake, alert, and cooperative. No acute distress. Resting comfortably on cart.   HENT: MMM. External ears and nose normal bilaterally.  EYES: EOM intact. Conjunctiva clear. No discharge.   NECK: Symmetric, freely mobile.   CV : Regular rate and rhythm.  PULM: Normal effort. No wheezes, rales, or rhonchi bilaterally. Speaking in full sentences. No accessory muscle usage.   ABD: Soft, non-tender, non-distended. No rebound or guarding.   NEURO: Normal speech. Following commands. CN II-XII grossly intact. Answering questions and interacting appropriately.   EXT: No lower extremity edema, erythema, or tenderness.   INT: Warm. No diaphoresis. Normal color.        ED Course        Procedures           Critical Care time:  none               No results found for this or any " previous visit (from the past 24 hour(s)).    Medications - No data to display    Assessments & Plan (with Medical Decision Making)   25 year old male with past medical history of anxiety and recently recovered from Sars-CoV2 with intermittent dyspnea. He is well appearing on exam, lungs are clear SpO2 98% on room air. No clinical signs of DVT. No pleuritic chest pain. Dyspnea is not exertional. More likely related to anxiety. I have very low suspicion for VTE given intermittent nature of symptoms, no fever, hypoxia, tachycardia, pleuritic chest pain, or clinical signs of DVT. No concern for pneumonia, no fever or cough. No dyspnea while in department. Recommend he follow up with primary provider for his anxiety since busparone is not being well tolerated. Follow up and ED return precautions discussed.     I have reviewed the nursing notes.         New Prescriptions    No medications on file       Final diagnoses:   Dyspnea, unspecified type   2019 novel coronavirus disease (COVID-19)     Benjamin Page MD    4/4/2021   [facility] EMERGENCY DEPT    Disclaimer: This note consists of words and symbols derived from keyboarding and dictation using voice recognition software.  As a result, there may be errors that have gone undetected.  Please consider this when interpreting information found in this note.             Benjamin Page MD  04/09/21 1933

## 2021-04-05 NOTE — TELEPHONE ENCOUNTER
Call initiated by srini Ventura, phone was then given to patient.  He states his sx began on 3/22/2021 with a fever and headache which ended on 3/26/2021. He tested positive for COVID on 3/24/2021. He states that the last 3 days his chest has felt heavy and it is hard to breathe. He has had pressure in his chest off and on (lasting 4 hrs at a time). He has been increasingly short of breath the last 3 days at rest and says it is worse at night. He has a dry cough, that is occasionally productive of a little phlegm.     Triaged to a disposition of Go to ED now.   He intends to go to the [place] ER now.    Rashmi Culver RN Triage Nurse Advisor 9:28 PM 4/4/2021     Reason for Disposition    MODERATE difficulty breathing (e.g., speaks in phrases, SOB even at rest, pulse 100-120)    Chest pain or pressure    Additional Information    Negative: SEVERE difficulty breathing (e.g., struggling for each breath, speaks in single words)    Negative: Difficult to awaken or acting confused (e.g., disoriented, slurred speech)    Negative: Bluish (or gray) lips or face now    Negative: Shock suspected (e.g., cold/pale/clammy skin, too weak to stand, low BP, rapid pulse)    Negative: Sounds like a life-threatening emergency to the triager    Negative: [1] COVID-19 exposure AND [2] no symptoms    Negative: [1] Lives with someone known to have influenza (flu test positive) AND [2] flu-like symptoms (e.g., cough, runny nose, sore throat, SOB; with or without fever)    Negative: [1] Adult with possible COVID-19 symptoms AND [2] triager concerned about severity of symptoms or other causes    Negative: COVID-19 vaccine reaction suspected (e.g., fever, headache, muscle aches) occurring during days 1-3 after getting vaccine    Negative: COVID-19 vaccine, questions about    Negative: COVID-19 and breastfeeding, questions about    Negative: SEVERE or constant chest pain or pressure (Exception: mild central chest pain, present only when  coughing)    Protocols used: CORONAVIRUS (COVID-19) DIAGNOSED OR [id number] 1.3  COVID 19 Nurse Triage Plan/Patient Instructions    Please be aware that novel coronavirus (COVID-19) may be circulating in the community. If you develop symptoms such as fever, cough, or SOB or if you have concerns about the presence of another infection including coronavirus (COVID-19), please contact your health care provider or visit [email or web address].     Disposition/Instructions    ED Visit recommended. Follow protocol based instructions.     Bring Your Own Device:  Please also bring your smart device(s) (smart phones, tablets, laptops) and their charging cables for your personal use and to communicate with your care team during your visit.    Thank you for taking steps to prevent the spread of this virus.  o Limit your contact with others.  o Wear a simple mask to cover your cough.  o Wash your hands well and often.    Resources    [facility]: About COVID-19: [email or web address]    CDC: What to Do If You're Sick: www.cdc.gov/coronavirus/2019-ncov/about/steps-when-sick.html    CDC: Ending Home Isolation: www.cdc.gov/coronavirus/2019-ncov/hcp/disposition-in-home-patients.html     CDC: Caring for Someone: www.cdc.gov/coronavirus/2019-ncov/if-you-are-sick/care-for-someone.html     [facility]: Interim Guidance for Hospital Discharge to Home: www.health.[facility].[place]/diseases/coronavirus/hcp/hospdischarge.pdf    [facility] clinical trials (COVID-19 research studies): [email or web address].[email or web address]/umn-clinical-trials     Below are the COVID-19 hotlines at the [place] Department of Health ([facility]). Interpreters are available.   o For health questions: Call 287-084-5048 or 1-629.872.5507 (7 a.m. to 7 p.m.)  o For questions about schools and childcare: Call 782-555-8773 or 1-637.591.3045 (7 a.m. to 7 p.m.)

## 2021-04-05 NOTE — ED TRIAGE NOTES
Patient started to experience symptoms 4/21, tested positive 4/23 via saliva test. Patient states he only had ~ 2-3 days of low grade fever and HA. Until ~ 3 days ago he started to experience SOB, cough and chest pain. Productive cough. Anterior chest pain reproducible with palpation. Was able to run with kite outside today without problem. SOB worse after eating. Reports he has high anxiety and restarted Buspar this week (on and off). Denies body aches, N/V/D, loss of taste or smell.

## 2021-04-09 ASSESSMENT — ENCOUNTER SYMPTOMS
NAUSEA: 0
LIGHT-HEADEDNESS: 0
FEVER: 0
HEADACHES: 0
DIARRHEA: 0
CHILLS: 0
ABDOMINAL PAIN: 0
COLOR CHANGE: 0
MYALGIAS: 0
VOMITING: 0
SHORTNESS OF BREATH: 1
DIFFICULTY URINATING: 0

## 2021-08-31 ENCOUNTER — APPOINTMENT (OUTPATIENT)
Dept: GENERAL RADIOLOGY | Facility: CLINIC | Age: 26
End: 2021-08-31
Attending: PHYSICIAN ASSISTANT
Payer: COMMERCIAL

## 2021-08-31 ENCOUNTER — HOSPITAL ENCOUNTER (EMERGENCY)
Facility: CLINIC | Age: 26
Discharge: HOME | End: 2021-08-31
Attending: PHYSICIAN ASSISTANT | Admitting: PHYSICIAN ASSISTANT
Payer: COMMERCIAL

## 2021-08-31 VITALS
TEMPERATURE: 97.9 F | OXYGEN SATURATION: 99 % | HEART RATE: 69 BPM | BODY MASS INDEX: 23.99 KG/M2 | DIASTOLIC BLOOD PRESSURE: 73 MMHG | RESPIRATION RATE: 20 BRPM | WEIGHT: 162 LBS | HEIGHT: 69 IN | SYSTOLIC BLOOD PRESSURE: 122 MMHG

## 2021-08-31 DIAGNOSIS — J06.9 VIRAL URI WITH COUGH: ICD-10-CM

## 2021-08-31 LAB
DEPRECATED S PYO AG THROAT QL EIA: NEGATIVE
GROUP A STREP BY PCR: NOT DETECTED
SARS-COV-2 RNA RESP QL NAA+PROBE: NEGATIVE

## 2021-08-31 PROCEDURE — 99214 OFFICE O/P EST MOD 30 MIN: CPT | Performed by: PHYSICIAN ASSISTANT

## 2021-08-31 PROCEDURE — 71045 X-RAY EXAM CHEST 1 VIEW: CPT

## 2021-08-31 PROCEDURE — 87651 STREP A DNA AMP PROBE: CPT | Performed by: PHYSICIAN ASSISTANT

## 2021-08-31 PROCEDURE — G0463 HOSPITAL OUTPT CLINIC VISIT: HCPCS | Mod: 25 | Performed by: PHYSICIAN ASSISTANT

## 2021-08-31 PROCEDURE — 87635 SARS-COV-2 COVID-19 AMP PRB: CPT | Performed by: PHYSICIAN ASSISTANT

## 2021-08-31 ASSESSMENT — ENCOUNTER SYMPTOMS
SHORTNESS OF BREATH: 1
FEVER: 0
CONSTITUTIONAL NEGATIVE: 1
CARDIOVASCULAR NEGATIVE: 1
COUGH: 1

## 2021-08-31 ASSESSMENT — MIFFLIN-ST. JEOR: SCORE: 1710.21

## 2021-08-31 NOTE — ED PROVIDER NOTES
History     Chief Complaint   Patient presents with     Cough     Shortness of Breath     HPI  Fran Staley is a 26 year old male who presents with complaints of cough and shortness of breath since yesterday. He states he has burning throughout his chest with deep breathing. Patient states his children are getting over RSV as this has been going around her . Denies fevers, chills, sinus pressure, nasal congestion, nausea, vomiting, diarrhea, abdominal pain, or chest pain. Patient was ill with COVID-19 in April of this year. He states he felt like his symptoms completely resolved at that time. Patient denies history of asthma or underlying lung disease.      Allergies:  Allergies   Allergen Reactions     Amoxicillin Hives     Penicillins Hives     Sulfa Drugs        Problem List:    Patient Active Problem List    Diagnosis Date Noted     Pityriasis rosea 01/08/2019     Priority: Medium     SABINA (generalized anxiety disorder) 10/17/2017     Priority: Medium     Bilateral thoracic back pain 07/26/2017     Priority: Medium     Drug allergy 03/20/2012     Priority: Medium     March 20, 2012 hives from amoxicillin              Past Medical History:    No past medical history on file.    Past Surgical History:    Past Surgical History:   Procedure Laterality Date     none         Family History:    Family History   Problem Relation Age of Onset     Mental Illness Father         ADD     Hypertension Father      Hyperlipidemia Father      Cerebrovascular Disease Maternal Grandmother         Cale bermudez cancer ? Colon ? Arthritis     Other Cancer Maternal Grandmother      Diabetes Maternal Grandfather      Heart Disease Maternal Grandfather      Colon Cancer Maternal Grandfather      Cerebrovascular Disease Paternal Grandfather      Myocardial Infarction Maternal Uncle        Social History:  Marital Status:  Single [1]  Social History     Tobacco Use     Smoking status: Never Smoker     Smokeless tobacco: Never  "Used   Substance Use Topics     Alcohol use: Yes     Comment: Sometimes when going out with freinds     Drug use: Never        Medications:    EPINEPHrine (ANY BX GENERIC EQUIV) 0.3 MG/0.3ML injection 2-pack          Review of Systems   Constitutional: Negative.  Negative for fever.   HENT: Negative.    Respiratory: Positive for cough and shortness of breath.    Cardiovascular: Negative.    Skin: Negative.    All other systems reviewed and are negative.      Physical Exam   BP: 122/73  Pulse: 69  Temp: 97.9  F (36.6  C)  Resp: 20  Height: 175.3 cm (5' 9\")  Weight: 73.5 kg (162 lb)  SpO2: 99 %      Physical Exam  Constitutional:       General: He is not in acute distress.     Appearance: Normal appearance. He is well-developed. He is not ill-appearing, toxic-appearing or diaphoretic.   HENT:      Head: Normocephalic and atraumatic.      Right Ear: Tympanic membrane, ear canal and external ear normal.      Left Ear: Tympanic membrane, ear canal and external ear normal.      Nose: Nose normal. No mucosal edema, congestion or rhinorrhea.      Mouth/Throat:      Lips: Pink.      Mouth: Mucous membranes are moist.      Pharynx: Oropharynx is clear. Uvula midline. No pharyngeal swelling, oropharyngeal exudate, posterior oropharyngeal erythema or uvula swelling.      Tonsils: No tonsillar exudate or tonsillar abscesses.   Eyes:      Extraocular Movements: Extraocular movements intact.      Conjunctiva/sclera: Conjunctivae normal.      Pupils: Pupils are equal, round, and reactive to light.   Cardiovascular:      Rate and Rhythm: Normal rate and regular rhythm.      Pulses: Normal pulses.      Heart sounds: Normal heart sounds.   Pulmonary:      Effort: Pulmonary effort is normal. No respiratory distress.      Breath sounds: Normal breath sounds. No stridor. No wheezing, rhonchi or rales.   Musculoskeletal:         General: Normal range of motion.      Cervical back: Full passive range of motion without pain, normal range of " motion and neck supple. No rigidity. Normal range of motion.   Lymphadenopathy:      Cervical: No cervical adenopathy.   Skin:     General: Skin is warm and dry.      Capillary Refill: Capillary refill takes less than 2 seconds.   Neurological:      Mental Status: He is alert and oriented to person, place, and time.      Sensory: Sensation is intact.      Motor: Motor function is intact.   Psychiatric:         Behavior: Behavior is cooperative.         ED Course        Procedures    Results for orders placed or performed during the hospital encounter of 08/31/21 (from the past 24 hour(s))   Streptococcus A Rapid Scr w Reflx to PCR    Specimen: Throat; Swab   Result Value Ref Range    Group A Strep antigen Negative Negative   Symptomatic COVID-19 Virus (Coronavirus) by PCR Nasopharyngeal    Specimen: Nasopharyngeal; Swab   Result Value Ref Range    SARS CoV2 PCR Negative Negative    Narrative    Testing was performed using the mesfin  SARS-CoV-2 & Influenza A/B Assay on the mesfin  Khadijah  System.  This test should be ordered for the detection of SARS-COV-2 in individuals who meet SARS-CoV-2 clinical and/or epidemiological criteria. Test performance is unknown in asymptomatic patients.  This test is for in vitro diagnostic use under the FDA EUA for laboratories certified under CLIA to perform moderate and/or high complexity testing. This test has not been FDA cleared or approved.  A negative test does not rule out the presence of PCR inhibitors in the specimen or target RNA in concentration below the limit of detection for the assay. The possibility of a false negative should be considered if the patient's recent exposure or clinical presentation suggests COVID-19.  [facility] are certified under the Clinical Laboratory Improvement Amendments of 1988 (CLIA-88) as qualified to perform moderate and/or high complexity laboratory testing.   Group A Streptococcus PCR Throat Swab    Specimen: Throat; Swab    Result Value Ref Range    Group A strep by PCR Not Detected Not Detected    Narrative    The Xpert Xpress Strep A test, performed on the [employer] Systems, is a rapid, qualitative in vitro diagnostic test for the detection of Streptococcus pyogenes (Group A ß-hemolytic Streptococcus, Strep A) in throat swab specimens from patients with signs and symptoms of pharyngitis. The Xpert Xpress Strep A test can be used as an aid in the diagnosis of Group A Streptococcal pharyngitis. The assay is not intended to monitor treatment for Group A Streptococcus infections. The Xpert Xpress Strep A test utilizes an automated real-time polymerase chain reaction (PCR) to detect Streptococcus pyogenes DNA.   XR Chest Port 1 View    Narrative    CHEST PORTABLE ONE VIEW   8/31/2021 1:42 PM     HISTORY: Cough, shortness of breath.    COMPARISON: Chest x-ray on 9/22/2019      Impression    IMPRESSION: AP view of the chest was obtained. Cardiomediastinal  silhouette is within normal limits. No suspicious focal pulmonary  opacities. No significant pleural effusion or pneumothorax.     SPEEDY DIEGO MD         SYSTEM ID:  [id number]       Medications - No data to display    Assessments & Plan (with Medical Decision Making)     Pt is a 26 year old male who presents with complaints of cough and shortness of breath since yesterday. He states he has burning throughout his chest with deep breathing. Patient states his children are getting over RSV as this has been going around her . Patient was ill with COVID-19 in April of this year. He states he felt like his symptoms completely resolved at that time. Patient denies history of asthma or underlying lung disease.    Pt is afebrile on arrival.  Exam as above.  O2 sats of 99% on room air.  Heart rate of 69.  Patient is in no acute respiratory distress.  Rapid strep was negative.  Culture is pending.  COVID-19 testing was negative.  Chest x-ray is negative for pneumonia or  acute pathology.  Discussed results with patient.  Encouraged symptomatic treatments at home of suspected viral illness.  Return precautions were reviewed.  Hand-outs were provided.    Patient was instructed to follow-up with PCP in 3-5 days for continued care and management or sooner if new or worsening symptoms.  He is to return to the ED for persistent and/or worsening symptoms.  Patient expressed understanding of the diagnosis and plan and was discharged home in good condition.    I have reviewed the nursing notes.    I have reviewed the findings, diagnosis, plan and need for follow up with the patient.    Discharge Medication List as of 8/31/2021  1:55 PM          Final diagnoses:   Viral URI with cough       8/31/2021   [facility] EMERGENCY DEPT      Disclaimer:  This note consists of symbols derived from keyboarding, dictation and/or voice recognition software.  As a result, there may be errors in the script that have gone undetected.  Please consider this when interpreting information found in this chart.     Sheeba Melton PA-C  08/31/21 1801

## 2021-08-31 NOTE — RESULT ENCOUNTER NOTE
Group A Streptococcus PCR is NEGATIVE  No treatment or change in treatment [facility] ED lab result Strep Group A protocol.

## 2021-08-31 NOTE — LETTER
August 31, 2021      To Whom It May Concern:      Fran MUNGUIA Yiseladelfo was seen in our Emergency Department today, 08/31/21.  Please excuse from work today and tomorrow.  Thank you.      Sincerely,        Sheeba Melton PA-C

## 2021-09-06 ENCOUNTER — E-VISIT (OUTPATIENT)
Dept: FAMILY MEDICINE | Facility: CLINIC | Age: 26
End: 2021-09-06
Payer: COMMERCIAL

## 2021-09-06 DIAGNOSIS — T63.444A BEE STING REACTION, UNDETERMINED INTENT, INITIAL ENCOUNTER: Primary | ICD-10-CM

## 2021-09-06 PROCEDURE — 99207 PR NON-BILLABLE SERV PER CHARTING: CPT | Performed by: PHYSICIAN ASSISTANT

## 2021-09-08 RX ORDER — PREDNISONE 20 MG/1
20 TABLET ORAL DAILY
Qty: 5 TABLET | Refills: 0 | Status: SHIPPED | OUTPATIENT
Start: 2021-09-08 | End: 2022-02-01

## 2022-02-01 ENCOUNTER — VIRTUAL VISIT (OUTPATIENT)
Dept: FAMILY MEDICINE | Facility: CLINIC | Age: 27
End: 2022-02-01
Payer: COMMERCIAL

## 2022-02-01 DIAGNOSIS — L98.9 SKIN LESION: Primary | ICD-10-CM

## 2022-02-01 PROCEDURE — 99213 OFFICE O/P EST LOW 20 MIN: CPT | Mod: 95 | Performed by: NURSE PRACTITIONER

## 2022-02-01 NOTE — PROGRESS NOTES
Fran is a 26 year old who is being evaluated via a billable video visit.      How would you like to obtain your AVS? MyChart  If the video visit is dropped, the invitation should be resent by: Text to cell phone: [phone]  Will anyone else be joining your video visit? No    Video Start Time: 5:08 PM    Assessment & Plan     Skin lesion  - Adult Dermatology Referral; Future                 No follow-ups on file.    Dolores Eugene, NAS CNP  [facility]    Subjective   Fran is a 26 year old who presents for the following health issues     HPI     PATIENT HAS HAD MOLE ON RIGHT SIDE OF NECK FOR YEARS. DOES NOT ITCH OR HAVE ANY PAIN, PATIENT DID NOTE OVER THE YEARS HE BELIEVES IT HAS GOTTEN BIGGER. HE WOULD LIKE A REFERRAL TO DERM TO HAVE IT REMOVED.     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        Review of Systems   Constitutional, HEENT, cardiovascular, pulmonary, gi and gu systems are negative, except as otherwise noted.      Objective           Vitals:  No vitals were obtained today due to virtual visit.    Physical Exam   GENERAL: Healthy, alert and no distress  EYES: Eyes grossly normal to inspection.  No discharge or erythema, or obvious scleral/conjunctival abnormalities.  RESP: No audible wheeze, cough, or visible cyanosis.  No visible retractions or increased work of breathing.    SKIN: Visible skin clear. No significant rash, abnormal pigmentation or lesions.  NEURO: Cranial nerves grossly intact.  Mentation and speech appropriate for age.  PSYCH: Mentation appears normal, affect normal/bright, judgement and insight intact, normal speech and appearance well-groomed.                Video-Visit Details    Type of service:  Video Visit    Video End Time:5:11 PM    Originating Location (pt. Location): Home    Distant Location (provider location):  [facility]     Platform used for Video Visit: [employer]

## 2022-02-15 ENCOUNTER — E-VISIT (OUTPATIENT)
Dept: URGENT CARE | Facility: URGENT CARE | Age: 27
End: 2022-02-15
Payer: COMMERCIAL

## 2022-02-15 DIAGNOSIS — B96.89 ACUTE BACTERIAL SINUSITIS: Primary | ICD-10-CM

## 2022-02-15 DIAGNOSIS — J01.90 ACUTE BACTERIAL SINUSITIS: Primary | ICD-10-CM

## 2022-02-15 PROCEDURE — 99421 OL DIG E/M SVC 5-10 MIN: CPT | Performed by: FAMILY MEDICINE

## 2022-02-15 RX ORDER — DOXYCYCLINE HYCLATE 100 MG
100 TABLET ORAL 2 TIMES DAILY
Qty: 14 TABLET | Refills: 0 | Status: SHIPPED | OUTPATIENT
Start: 2022-02-15 | End: 2022-02-22

## 2022-02-15 NOTE — PATIENT INSTRUCTIONS
Dear Fran Staley    After reviewing your responses, I've been able to diagnose you with?a sinus infection caused by bacteria.?     Based on your responses and diagnosis, I have prescribed Doxycycline to treat your symptoms. I have sent this to your pharmacy.?     It is also important to stay well hydrated, get lots of rest and take over-the-counter decongestants,?tylenol?or ibuprofen if you?are able to?take those medications per your primary care provider to help relieve discomfort.?     It is important that you take?all of?your prescribed medication even if your symptoms are improving after a few doses.? Taking?all of?your medicine helps prevent the symptoms from returning.?     If your symptoms worsen, you develop severe headache, vomiting, high fever (>102), or are not improving in 7 days, please contact your primary care provider for an appointment or visit any of our convenient Walk-in Care or Urgent Care Centers to be seen which can be found on our website?here.?     Thanks again for choosing?us?as your health care partner,?   ?  Avtar Stafford, DO?

## 2022-09-06 ENCOUNTER — HOSPITAL ENCOUNTER (EMERGENCY)
Facility: CLINIC | Age: 27
Discharge: HOME | End: 2022-09-06
Attending: NURSE PRACTITIONER | Admitting: NURSE PRACTITIONER
Payer: COMMERCIAL

## 2022-09-06 ENCOUNTER — APPOINTMENT (OUTPATIENT)
Dept: ULTRASOUND IMAGING | Facility: CLINIC | Age: 27
End: 2022-09-06
Attending: NURSE PRACTITIONER
Payer: COMMERCIAL

## 2022-09-06 VITALS
HEART RATE: 78 BPM | OXYGEN SATURATION: 98 % | DIASTOLIC BLOOD PRESSURE: 80 MMHG | RESPIRATION RATE: 16 BRPM | SYSTOLIC BLOOD PRESSURE: 137 MMHG

## 2022-09-06 DIAGNOSIS — N50.811 PAIN IN BOTH TESTICLES: ICD-10-CM

## 2022-09-06 DIAGNOSIS — N50.812 PAIN IN BOTH TESTICLES: ICD-10-CM

## 2022-09-06 LAB
ALBUMIN UR-MCNC: NEGATIVE MG/DL
ANION GAP SERPL CALCULATED.3IONS-SCNC: 10 MMOL/L (ref 7–15)
APPEARANCE UR: CLEAR
BASOPHILS # BLD AUTO: 0 10E3/UL (ref 0–0.2)
BASOPHILS NFR BLD AUTO: 0 %
BILIRUB UR QL STRIP: NEGATIVE
BUN SERPL-MCNC: 12.9 MG/DL (ref 6–20)
CALCIUM SERPL-MCNC: 9.4 MG/DL (ref 8.6–10)
CHLORIDE SERPL-SCNC: 101 MMOL/L (ref 98–107)
COLOR UR AUTO: ABNORMAL
CREAT SERPL-MCNC: 0.77 MG/DL (ref 0.67–1.17)
DEPRECATED HCO3 PLAS-SCNC: 29 MMOL/L (ref 22–29)
EOSINOPHIL # BLD AUTO: 0 10E3/UL (ref 0–0.7)
EOSINOPHIL NFR BLD AUTO: 0 %
ERYTHROCYTE [DISTWIDTH] IN BLOOD BY AUTOMATED COUNT: 12.3 % (ref 10–15)
GFR SERPL CREATININE-BSD FRML MDRD: >90 ML/MIN/1.73M2
GLUCOSE SERPL-MCNC: 97 MG/DL (ref 70–99)
GLUCOSE UR STRIP-MCNC: NEGATIVE MG/DL
HCT VFR BLD AUTO: 40.6 % (ref 40–53)
HGB BLD-MCNC: 13.9 G/DL (ref 13.3–17.7)
HGB UR QL STRIP: NEGATIVE
IMM GRANULOCYTES # BLD: 0 10E3/UL
IMM GRANULOCYTES NFR BLD: 0 %
KETONES UR STRIP-MCNC: NEGATIVE MG/DL
LEUKOCYTE ESTERASE UR QL STRIP: NEGATIVE
LYMPHOCYTES # BLD AUTO: 3.8 10E3/UL (ref 0.8–5.3)
LYMPHOCYTES NFR BLD AUTO: 44 %
MCH RBC QN AUTO: 30 PG (ref 26.5–33)
MCHC RBC AUTO-ENTMCNC: 34.2 G/DL (ref 31.5–36.5)
MCV RBC AUTO: 88 FL (ref 78–100)
MONOCYTES # BLD AUTO: 0.6 10E3/UL (ref 0–1.3)
MONOCYTES NFR BLD AUTO: 7 %
NEUTROPHILS # BLD AUTO: 4.3 10E3/UL (ref 1.6–8.3)
NEUTROPHILS NFR BLD AUTO: 49 %
NITRATE UR QL: NEGATIVE
NRBC # BLD AUTO: 0 10E3/UL
NRBC BLD AUTO-RTO: 0 /100
PH UR STRIP: 7.5 [PH] (ref 5–7)
PLATELET # BLD AUTO: 329 10E3/UL (ref 150–450)
POTASSIUM SERPL-SCNC: 3.9 MMOL/L (ref 3.4–5.3)
RBC # BLD AUTO: 4.63 10E6/UL (ref 4.4–5.9)
RBC URINE: <1 /HPF
SODIUM SERPL-SCNC: 140 MMOL/L (ref 136–145)
SP GR UR STRIP: 1.01 (ref 1–1.03)
UROBILINOGEN UR STRIP-MCNC: NORMAL MG/DL
WBC # BLD AUTO: 8.8 10E3/UL (ref 4–11)
WBC URINE: <1 /HPF

## 2022-09-06 PROCEDURE — 81001 URINALYSIS AUTO W/SCOPE: CPT | Performed by: NURSE PRACTITIONER

## 2022-09-06 PROCEDURE — 87491 CHLMYD TRACH DNA AMP PROBE: CPT | Performed by: NURSE PRACTITIONER

## 2022-09-06 PROCEDURE — 93976 VASCULAR STUDY: CPT

## 2022-09-06 PROCEDURE — 80048 BASIC METABOLIC PNL TOTAL CA: CPT | Performed by: NURSE PRACTITIONER

## 2022-09-06 PROCEDURE — 36415 COLL VENOUS BLD VENIPUNCTURE: CPT | Performed by: NURSE PRACTITIONER

## 2022-09-06 PROCEDURE — 85014 HEMATOCRIT: CPT | Performed by: NURSE PRACTITIONER

## 2022-09-06 PROCEDURE — 87591 N.GONORRHOEAE DNA AMP PROB: CPT | Performed by: NURSE PRACTITIONER

## 2022-09-06 PROCEDURE — 99284 EMERGENCY DEPT VISIT MOD MDM: CPT | Mod: 25 | Performed by: NURSE PRACTITIONER

## 2022-09-06 PROCEDURE — 99282 EMERGENCY DEPT VISIT SF MDM: CPT | Performed by: NURSE PRACTITIONER

## 2022-09-06 ASSESSMENT — ACTIVITIES OF DAILY LIVING (ADL): ADLS_ACUITY_SCORE: 35

## 2022-09-06 NOTE — DISCHARGE INSTRUCTIONS
No worrisome findings on your ultrasound, labs, or urinalysis.  Continue to monitor.  If not improving in 3 to 5 days you should have recheck, or sooner if you develop fever, increased abdominal pain, or any other new symptoms of concern.

## 2022-09-06 NOTE — ED TRIAGE NOTES
Sent over by the  to be seen in the ED for groin pain more on left side, no urinary complaints     Triage Assessment     Row Name 09/06/22 3409       Triage Assessment (Adult)    Airway WDL WDL       Respiratory WDL    Respiratory WDL WDL       Peripheral/Neurovascular WDL    Peripheral Neurovascular WDL WDL       Cognitive/Neuro/Behavioral WDL    Cognitive/Neuro/Behavioral WDL WDL

## 2022-09-06 NOTE — Clinical Note
Fran Staley was seen and treated in our emergency department on 9/6/2022.  He may return to work on 09/07/2022.       If you have any questions or concerns, please don't hesitate to call.      Susy Fairbanks APRN CNP

## 2022-09-06 NOTE — ED PROVIDER NOTES
"  History     Chief Complaint   Patient presents with     Groin Pain     Patient reporting groin pain beginning on Sunday. Patient states that pain was in front part of pelvic bone and then went into testicles. Patient reports swelling in both testicles. Patient states that pain is much better today but still has a \"constant, dull pain.\" Patient denies nausea and vomiting. Patient denies STI concern. Patient denies urinary symptoms.     HPI  Fran Staley is a 27 year old male who presents for evaluation of testicular pain.  Symptoms started on Sunday, 2 days ago.  Initially he reported suprapubic pain with radiation into both testicles.  Symptoms have lessened, but he continues to have aching in both testicles.  The left feels worse than the right and reports feeling like there is swelling.  Denies fever or chills.  Denies nausea or vomiting.  Denies urinary symptoms.  Denies constipation or diarrhea.  He is sexually active, no new partners. Patient does not have concern for STI.    Allergies:  Allergies   Allergen Reactions     Amoxicillin Hives     Penicillins Hives     Sulfa Drugs        Problem List:    Patient Active Problem List    Diagnosis Date Noted     Pityriasis rosea 01/08/2019     Priority: Medium     SABINA (generalized anxiety disorder) 10/17/2017     Priority: Medium     Bilateral thoracic back pain 07/26/2017     Priority: Medium     Drug allergy 03/20/2012     Priority: Medium     March 20, 2012 hives from amoxicillin              Past Medical History:    No past medical history on file.    Past Surgical History:    Past Surgical History:   Procedure Laterality Date     none         Family History:    Family History   Problem Relation Age of Onset     Mental Illness Father         ADD     Hypertension Father      Hyperlipidemia Father      Cerebrovascular Disease Maternal Grandmother         Cale bermudez cancer ? Colon ? Arthritis     Other Cancer Maternal Grandmother      Diabetes Maternal " Grandfather      Heart Disease Maternal Grandfather      Colon Cancer Maternal Grandfather      Cerebrovascular Disease Paternal Grandfather      Myocardial Infarction Maternal Uncle        Social History:  Marital Status:  Single [1]  Social History     Tobacco Use     Smoking status: Never Smoker     Smokeless tobacco: Never Used   Vaping Use     Vaping Use: Some days   Substance Use Topics     Alcohol use: Yes     Comment: Sometimes when going out with freinds     Drug use: Never        Medications:    EPINEPHrine (ANY BX GENERIC EQUIV) 0.3 MG/0.3ML injection 2-pack          Review of Systems  As mentioned above in the history present illness. All other systems were reviewed and are negative.    Physical Exam   BP: 137/80  Pulse: 78  Resp: 16  SpO2: 98 %      Physical Exam  Constitutional:       General: He is not in acute distress.     Appearance: Normal appearance. He is well-developed. He is not ill-appearing.   HENT:      Head: Normocephalic and atraumatic.      Right Ear: External ear normal.      Left Ear: External ear normal.      Nose: Nose normal.      Mouth/Throat:      Mouth: Mucous membranes are moist.   Eyes:      Conjunctiva/sclera: Conjunctivae normal.   Cardiovascular:      Rate and Rhythm: Normal rate and regular rhythm.      Heart sounds: Normal heart sounds. No murmur heard.  Pulmonary:      Effort: Pulmonary effort is normal. No respiratory distress.      Breath sounds: Normal breath sounds.   Abdominal:      General: Bowel sounds are normal. There is no distension.      Palpations: Abdomen is soft.      Tenderness: There is no abdominal tenderness.   Genitourinary:     Penis: Normal and circumcised.       Testes: Cremasteric reflex is present.         Right: Mass or swelling not present. Right testis is descended.         Left: Tenderness present. Mass or swelling not present. Left testis is descended.      Epididymis:      Right: Normal.      Left: Normal.   Musculoskeletal:         General:  Normal range of motion.   Skin:     General: Skin is warm and dry.      Findings: No rash.   Neurological:      General: No focal deficit present.      Mental Status: He is alert and oriented to person, place, and time.         ED Course                 Procedures         Results for orders placed or performed during the hospital encounter of 09/06/22 (from the past 24 hour(s))   CBC with platelets differential    Narrative    The following orders were created for panel order CBC with platelets differential.  Procedure                               Abnormality         Status                     ---------                               -----------         ------                     CBC with platelets and d...[[id number]]                      Final result                 Please view results for these tests on the individual orders.   Basic metabolic panel   Result Value Ref Range    Creatinine 0.77 0.67 - 1.17 mg/dL    Sodium 140 136 - 145 mmol/L    Potassium 3.9 3.4 - 5.3 mmol/L    Urea Nitrogen 12.9 6.0 - 20.0 mg/dL    Chloride 101 98 - 107 mmol/L    Carbon Dioxide (CO2) 29 22 - 29 mmol/L    Anion Gap 10 7 - 15 mmol/L    Glucose 97 70 - 99 mg/dL    GFR Estimate >90 >60 mL/min/1.73m2    Calcium 9.4 8.6 - 10.0 mg/dL   CBC with platelets and differential   Result Value Ref Range    WBC Count 8.8 4.0 - 11.0 10e3/uL    RBC Count 4.63 4.40 - 5.90 10e6/uL    Hemoglobin 13.9 13.3 - 17.7 g/dL    Hematocrit 40.6 40.0 - 53.0 %    MCV 88 78 - 100 fL    MCH 30.0 26.5 - 33.0 pg    MCHC 34.2 31.5 - 36.5 g/dL    RDW 12.3 10.0 - 15.0 %    Platelet Count 329 150 - 450 10e3/uL    % Neutrophils 49 %    % Lymphocytes 44 %    % Monocytes 7 %    % Eosinophils 0 %    % Basophils 0 %    % Immature Granulocytes 0 %    NRBCs per 100 WBC 0 <1 /100    Absolute Neutrophils 4.3 1.6 - 8.3 10e3/uL    Absolute Lymphocytes 3.8 0.8 - 5.3 10e3/uL    Absolute Monocytes 0.6 0.0 - 1.3 10e3/uL    Absolute Eosinophils 0.0 0.0 - 0.7 10e3/uL    Absolute  Basophils 0.0 0.0 - 0.2 10e3/uL    Absolute Immature Granulocytes 0.0 <=0.4 10e3/uL    Absolute NRBCs 0.0 10e3/uL   US Testicular & Scrotum w Doppler Ltd    Narrative    TESTICULAR ULTRASOUND September 6, 2022 3:18 PM     HISTORY: Bilateral testicular pain, left greater than the right.     COMPARISON: None.    TECHNIQUE: Ultrasound gray scale, color Doppler flow, and Doppler  spectral waveform analysis performed.    FINDINGS: There is homogeneous normal echotexture throughout the  bilateral testes. The left testicle measures 4.9 x 3.0 x 2.4 cm. The  right testicle measures 5.0 x 3.4 x 2.6 cm. Epididymal head cysts  measuring up to 4 mm noted on the right.  The left epididymis is  unremarkable. Doppler evaluation shows normal arterial waveforms to  the testes bilaterally. There is no hydrocele. There is no varicocele.  There is no mass or abnormal calcifications.      Impression    IMPRESSION: No acute process demonstrated.    LESIA JAMES MD         SYSTEM ID:  [id number]   UA with Microscopic reflex to Culture    Specimen: Urine, Clean Catch   Result Value Ref Range    Color Urine Straw Colorless, Straw, Light Yellow, Yellow    Appearance Urine Clear Clear    Glucose Urine Negative Negative mg/dL    Bilirubin Urine Negative Negative    Ketones Urine Negative Negative mg/dL    Specific Gravity Urine 1.010 1.003 - 1.035    Blood Urine Negative Negative    pH Urine 7.5 (H) 5.0 - 7.0    Protein Albumin Urine Negative Negative mg/dL    Urobilinogen Urine Normal Normal, 2.0 mg/dL    Nitrite Urine Negative Negative    Leukocyte Esterase Urine Negative Negative    RBC Urine <1 <=2 /HPF    WBC Urine <1 <=5 /HPF    Narrative    Urine Culture not indicated       Medications - No data to display    Assessments & Plan (with Medical Decision Making)   27 year old male who presents for evaluation of testicular pain.  Symptoms started on Sunday, 2 days ago.  Initially he reported suprapubic pain with radiation into both testicles.   Symptoms have lessened, but he continues to have aching in both testicles.  The left feels worse than the right and reports feeling like there is swelling.  Denies fever or chills.  Denies nausea or vomiting.  Denies urinary symptoms.  Denies constipation or diarrhea.  He is sexually active, no new partners. Patient does not have concern for STI.    On exam patient is alert and oriented. NAD. Normotensive. No tachycardia. Mild tenderness with palpation to the left testicle. No scrotal swelling or erythema. No abdominal tenderness.   Labs are unremarkable.   UA is negative for infection. GC/Queenie pending.   Testicular and Scrotum US is normal.     Unclear cause for her pain. It is reassuring that pain has lessened.  Instructed to recheck for fevers, abdominal pain, or any new symptoms.    Discharge Medication List as of 9/6/2022  4:15 PM          Final diagnoses:   Pain in both testicles       9/6/2022   [facility] EMERGENCY DEPT     Susy Fairbanks APRN CNP  09/06/22 0498

## 2022-09-07 LAB
C TRACH DNA SPEC QL PROBE+SIG AMP: NEGATIVE
N GONORRHOEA DNA SPEC QL NAA+PROBE: NEGATIVE

## 2022-09-07 NOTE — RESULT ENCOUNTER NOTE
Final result for both N. Gonorrhoeae PCR and Chlamydia Trachomatis PCR are NEGATIVE.  No treatment or change in treatment per [facility] ED Lab Result N. Gonorrhea AND/OR Chlamydia T. protocol.

## 2022-11-18 ENCOUNTER — E-VISIT (OUTPATIENT)
Dept: FAMILY MEDICINE | Facility: CLINIC | Age: 27
End: 2022-11-18
Payer: COMMERCIAL

## 2022-11-18 DIAGNOSIS — Z20.828 EXPOSURE TO INFLUENZA: Primary | ICD-10-CM

## 2022-11-18 PROCEDURE — 99421 OL DIG E/M SVC 5-10 MIN: CPT | Performed by: PHYSICIAN ASSISTANT

## 2022-11-18 RX ORDER — OSELTAMIVIR PHOSPHATE 75 MG/1
75 CAPSULE ORAL DAILY
Qty: 7 CAPSULE | Refills: 0 | Status: SHIPPED | OUTPATIENT
Start: 2022-11-18 | End: 2022-11-25

## 2022-11-18 NOTE — PATIENT INSTRUCTIONS
Thank you for choosing us for your care. I have placed an order for a prescription so that you can start treatment. View your full visit summary for details by clicking on the link below. Your pharmacist will able to address any questions you may have about the medication.     If you're not feeling better within 5-7 days, please schedule an appointment.  You can schedule an appointment right here in [facility], or call [phone]  If the visit is for the same symptoms as your eVisit, we'll refund the cost of your eVisit if seen within seven days.

## 2023-05-27 ENCOUNTER — HOSPITAL ENCOUNTER (EMERGENCY)
Facility: CLINIC | Age: 28
Discharge: HOME | End: 2023-05-27
Attending: PHYSICIAN ASSISTANT | Admitting: PHYSICIAN ASSISTANT
Payer: COMMERCIAL

## 2023-05-27 VITALS
HEART RATE: 77 BPM | DIASTOLIC BLOOD PRESSURE: 71 MMHG | TEMPERATURE: 98 F | OXYGEN SATURATION: 98 % | SYSTOLIC BLOOD PRESSURE: 147 MMHG | RESPIRATION RATE: 18 BRPM

## 2023-05-27 DIAGNOSIS — K08.89 PAIN, DENTAL: ICD-10-CM

## 2023-05-27 DIAGNOSIS — K04.7 DENTAL INFECTION: ICD-10-CM

## 2023-05-27 PROCEDURE — 99213 OFFICE O/P EST LOW 20 MIN: CPT | Performed by: PHYSICIAN ASSISTANT

## 2023-05-27 PROCEDURE — G0463 HOSPITAL OUTPT CLINIC VISIT: HCPCS | Performed by: PHYSICIAN ASSISTANT

## 2023-05-27 RX ORDER — IBUPROFEN 600 MG/1
600 TABLET, FILM COATED ORAL EVERY 6 HOURS PRN
Qty: 30 TABLET | Refills: 0 | Status: SHIPPED | OUTPATIENT
Start: 2023-05-27 | End: 2023-07-12

## 2023-05-27 RX ORDER — CLINDAMYCIN HCL 150 MG
450 CAPSULE ORAL 3 TIMES DAILY
Qty: 63 CAPSULE | Refills: 0 | Status: SHIPPED | OUTPATIENT
Start: 2023-05-27 | End: 2023-06-03

## 2023-05-27 ASSESSMENT — ACTIVITIES OF DAILY LIVING (ADL): ADLS_ACUITY_SCORE: 35

## 2023-05-27 NOTE — DISCHARGE INSTRUCTIONS
Recommend follow-up in dental clinic in 2 days.    Seek urgent medical evaluation if there are new or worsening symptoms such as fever of 102 degrees F or greater, chest tightness, wheezing, facial pressure, pain/redness/tenderness/swelling around the eyes or ears, headaches, trouble breathing, or trouble swallowing      Tylenol and ibuprofen can be used at the same time for pain control because they work differently.     Take ibuprofen 400 mg every (4) hours or 600 mg every (6) hours for pain control (max 2400 mg per day)    Take acetaminophen 1000 mg every (6) hours for pain control (max 4000 mg per day).    Plan on taking acetaminophen in between doses of ibuprofen. For severe pain, you can take Tylenol and ibuprofen at the same time. Return to clinic or emergency room if pain persists or worsens.    Recommend taking a probiotic at the same time you are on antibiotic. Probiotic supports and maintains digestive health while taking antibiotic.

## 2023-05-27 NOTE — ED PROVIDER NOTES
History     Chief Complaint   Patient presents with     Dental Pain     X 2 days. Left side, upper and lower in the back of mouth.     HPI  Fran Staley is a 28 year old male who presents for evaluation of dental pain in the upper and lower jaw on the left side which has been ongoing worsening over the past 3 days.  He has been taking over-the-counter ibuprofen with limited relief of pain.  No fevers, no facial swelling, no neck swelling or pain.  No concerns of breathing or swallowing, no discomfort in the chest.  No pain in the back of the neck..  He has feelings in the affected molars, has not been to the dentist for 4 years.  States that he has pain when touching the teeth together.    Allergies:  Allergies   Allergen Reactions     Amoxicillin Hives     Penicillins Hives     Sulfacetamide Swelling     eye drops caused eyes to swell shut     Sulfa Antibiotics Unknown       Problem List:    Patient Active Problem List    Diagnosis Date Noted     Pityriasis rosea 01/08/2019     Priority: Medium     SABINA (generalized anxiety disorder) 10/17/2017     Priority: Medium     Bilateral thoracic back pain 07/26/2017     Priority: Medium     Drug allergy 03/20/2012     Priority: Medium     March 20, 2012 hives from amoxicillin              Past Medical History:    No past medical history on file.    Past Surgical History:    Past Surgical History:   Procedure Laterality Date     none         Family History:    Family History   Problem Relation Age of Onset     Mental Illness Father         ADD     Hypertension Father      Hyperlipidemia Father      Cerebrovascular Disease Maternal Grandmother         Jefferytobias danielarojelio cancer ? Colon ? Arthritis     Other Cancer Maternal Grandmother      Diabetes Maternal Grandfather      Heart Disease Maternal Grandfather      Colon Cancer Maternal Grandfather      Cerebrovascular Disease Paternal Grandfather      Myocardial Infarction Maternal Uncle        Social History:  Marital Status:   Single [1]  Social History     Tobacco Use     Smoking status: Never     Smokeless tobacco: Never   Vaping Use     Vaping status: Some Days   Substance Use Topics     Alcohol use: Yes     Comment: Sometimes when going out with freinds     Drug use: Never        Medications:    clindamycin (CLEOCIN) 150 MG capsule  EPINEPHrine (ANY BX GENERIC EQUIV) 0.3 MG/0.3ML injection 2-pack  ibuprofen (ADVIL/MOTRIN) 600 MG tablet          Review of Systems   Constitutional: Negative.    HENT: Positive for dental problem. Negative for facial swelling.    Respiratory: Negative.    Cardiovascular: Negative.    Musculoskeletal: Negative.        Physical Exam   BP: (!) 147/71  Pulse: 77  Temp: 98  F (36.7  C)  Resp: 18  SpO2: 98 %      Physical Exam  Vitals reviewed.   Constitutional:       General: He is not in acute distress.     Appearance: Normal appearance. He is not ill-appearing, toxic-appearing or diaphoretic.   HENT:      Head: Normocephalic and atraumatic.      Nose: Nose normal. No congestion or rhinorrhea.      Right Sinus: No maxillary sinus tenderness or frontal sinus tenderness.      Left Sinus: No maxillary sinus tenderness or frontal sinus tenderness.      Mouth/Throat:      Mouth: Mucous membranes are moist.      Dentition: Abnormal dentition. Does not have dentures. Dental tenderness present. No gingival swelling, dental caries, dental abscesses or gum lesions.      Pharynx: Oropharynx is clear. Uvula midline. No pharyngeal swelling, oropharyngeal exudate, posterior oropharyngeal erythema or uvula swelling.        Comments: Moderate tenderness with direct pressure over tooth 14 and tooth 19.  No bogginess or fluctuance in the gingival tissue.  No facial swelling or neck swelling.  Musculoskeletal:         General: Normal range of motion.      Cervical back: Normal range of motion and neck supple. No rigidity or tenderness. No muscular tenderness.   Lymphadenopathy:      Cervical: No cervical adenopathy.   Skin:      General: Skin is warm and dry.   Neurological:      Mental Status: He is alert and oriented to person, place, and time.         ED Course                 Procedures            No results found for this or any previous visit (from the past 24 hour(s)).    Medications - No data to display    Assessments & Plan (with Medical Decision Making)     The patient is a 28-year-old male who presents for evaluation of dental pain affecting tooth 14 and tooth 19 and the left upper and lower jaw.  Starting clindamycin today given he has an allergy to penicillins.  He has no fevers, no acute headaches or vision concerns.  No swelling in the face or neck.  No concerns of breathing or swallowing.  No evidence for dental abscess on exam.    Recommend follow-up in dental clinic in 2 days.  Provided a list of low-cost clinics for the patient not be able to schedule with his regular dentist.      Seek urgent medical evaluation if there are new or worsening symptoms such as fever of 102 degrees F or greater, chest tightness, wheezing, facial pressure, pain/redness/tenderness/swelling around the eyes or ears, headaches, trouble breathing, or trouble swallowing        Pt/guardian verbalized understanding and agrees with the treatment plan.      I have reviewed the nursing notes.    I have reviewed the findings, diagnosis, plan and need for follow up with the patient.      Discharge Medication List as of 5/27/2023  3:54 PM      START taking these medications    Details   clindamycin (CLEOCIN) 150 MG capsule Take 3 capsules (450 mg) by mouth 3 times daily for 7 days, Disp-63 capsule, R-0, E-Prescribe      ibuprofen (ADVIL/MOTRIN) 600 MG tablet Take 1 tablet (600 mg) by mouth every 6 hours as needed for moderate pain, Disp-30 tablet, R-0, E-Prescribe             Final diagnoses:   Pain, dental   Dental infection       5/27/2023   [facility] EMERGENCY DEPT     Joel Lai PA-C  05/28/23 1191

## 2023-05-27 NOTE — LETTER
[facility] EMERGENCY DEPT  [address] [address]  Phone: [phone]  Fax: [phone]    May 27, 2023        Fran Staley  [address] [place] [address]          To whom it may concern:    RE: Fran NILDA Luís    Patient was seen and treated today at our clinic.  Due to medical reasons, please excuse him from scheduled employment on May 30, 2023    Please contact me for questions or concerns.      Sincerely,      Joel Lai PA-C

## 2023-05-28 ASSESSMENT — ENCOUNTER SYMPTOMS
CONSTITUTIONAL NEGATIVE: 1
MUSCULOSKELETAL NEGATIVE: 1
RESPIRATORY NEGATIVE: 1
FACIAL SWELLING: 0
CARDIOVASCULAR NEGATIVE: 1

## 2023-07-10 ENCOUNTER — E-VISIT (OUTPATIENT)
Dept: FAMILY MEDICINE | Facility: CLINIC | Age: 28
End: 2023-07-10
Payer: COMMERCIAL

## 2023-07-10 DIAGNOSIS — F41.9 ANXIETY: Primary | ICD-10-CM

## 2023-07-10 PROCEDURE — 99421 OL DIG E/M SVC 5-10 MIN: CPT | Performed by: PHYSICIAN ASSISTANT

## 2023-07-10 ASSESSMENT — ANXIETY QUESTIONNAIRES
7. FEELING AFRAID AS IF SOMETHING AWFUL MIGHT HAPPEN: SEVERAL DAYS
6. BECOMING EASILY ANNOYED OR IRRITABLE: SEVERAL DAYS
4. TROUBLE RELAXING: MORE THAN HALF THE DAYS
GAD7 TOTAL SCORE: 15
GAD7 TOTAL SCORE: 15
5. BEING SO RESTLESS THAT IT IS HARD TO SIT STILL: NEARLY EVERY DAY
3. WORRYING TOO MUCH ABOUT DIFFERENT THINGS: NEARLY EVERY DAY
1. FEELING NERVOUS, ANXIOUS, OR ON EDGE: NEARLY EVERY DAY
2. NOT BEING ABLE TO STOP OR CONTROL WORRYING: MORE THAN HALF THE DAYS

## 2023-07-11 ASSESSMENT — ANXIETY QUESTIONNAIRES: GAD7 TOTAL SCORE: 15

## 2023-07-12 RX ORDER — FLUOXETINE 10 MG/1
10-20 CAPSULE ORAL DAILY
Qty: 60 CAPSULE | Refills: 2 | Status: SHIPPED | OUTPATIENT
Start: 2023-07-12 | End: 2023-12-20

## 2023-11-02 ENCOUNTER — HOSPITAL ENCOUNTER (EMERGENCY)
Facility: CLINIC | Age: 28
Discharge: LEFT BEFORE DISCHARGE | End: 2023-11-02
Attending: NURSE PRACTITIONER | Admitting: NURSE PRACTITIONER
Payer: COMMERCIAL

## 2023-11-02 PROCEDURE — G0463 HOSPITAL OUTPT CLINIC VISIT: HCPCS

## 2023-11-03 NOTE — ED NOTES
Not in waiting room when called, made 3 attempts, security states saw go outside and does not see his car where it was

## 2023-12-20 ENCOUNTER — MYC REFILL (OUTPATIENT)
Dept: PEDIATRICS | Facility: CLINIC | Age: 28
End: 2023-12-20
Payer: COMMERCIAL

## 2023-12-20 ENCOUNTER — MYC REFILL (OUTPATIENT)
Dept: FAMILY MEDICINE | Facility: CLINIC | Age: 28
End: 2023-12-20
Payer: COMMERCIAL

## 2023-12-20 DIAGNOSIS — Z91.038 ALLERGIC REACTION TO INSECT BITE: ICD-10-CM

## 2023-12-20 DIAGNOSIS — F41.9 ANXIETY: ICD-10-CM

## 2023-12-20 RX ORDER — FLUOXETINE 10 MG/1
10-20 CAPSULE ORAL DAILY
Qty: 60 CAPSULE | Refills: 0 | Status: SHIPPED | OUTPATIENT
Start: 2023-12-20 | End: 2024-01-14

## 2023-12-21 RX ORDER — EPINEPHRINE 0.3 MG/.3ML
0.3 INJECTION SUBCUTANEOUS PRN
Qty: 1 EACH | Refills: 1 | Status: SHIPPED | OUTPATIENT
Start: 2023-12-21

## 2024-01-05 ENCOUNTER — VIRTUAL VISIT (OUTPATIENT)
Dept: FAMILY MEDICINE | Facility: CLINIC | Age: 29
End: 2024-01-05
Payer: COMMERCIAL

## 2024-01-05 DIAGNOSIS — Z53.9 NO SHOW: Primary | ICD-10-CM

## 2024-01-11 ENCOUNTER — MYC MEDICAL ADVICE (OUTPATIENT)
Dept: FAMILY MEDICINE | Facility: CLINIC | Age: 29
End: 2024-01-11

## 2024-01-11 ENCOUNTER — VIRTUAL VISIT (OUTPATIENT)
Dept: FAMILY MEDICINE | Facility: CLINIC | Age: 29
End: 2024-01-11
Payer: COMMERCIAL

## 2024-01-11 DIAGNOSIS — G47.26 SHIFT WORK SLEEP DISORDER: Primary | ICD-10-CM

## 2024-01-11 DIAGNOSIS — G47.26 SHIFT WORK SLEEP DISORDER: ICD-10-CM

## 2024-01-11 PROCEDURE — 99214 OFFICE O/P EST MOD 30 MIN: CPT | Mod: 95 | Performed by: STUDENT IN AN ORGANIZED HEALTH CARE EDUCATION/TRAINING PROGRAM

## 2024-01-11 RX ORDER — MODAFINIL 200 MG/1
200 TABLET ORAL DAILY PRN
Qty: 90 TABLET | Refills: 3 | Status: SHIPPED | OUTPATIENT
Start: 2024-01-11 | End: 2024-07-31

## 2024-01-11 NOTE — COMMUNITY RESOURCES LIST (ENGLISH)
01/11/2024   [facility] [facility]  N/A  For questions about this resource list or additional care needs, please contact your primary care clinic or care manager.  Phone: [phone]   Email: N/A   Address: [address] [place] [address]   Hours: N/A        Financial Stability       Utility payment assistance  1  [place] [facility] Office - Energy Assistance Program Distance: 8.65 miles      Phone/Virtual   [address] [place] [address]  Language: English  Hours: Mon - Fri 8:00 AM - 4:30 PM  Fees: Free   Phone: (744) 873-7296 Email: [email or web address] Website: [email or web address]/agency-information     2  [facility] and [facility] Distance: 10.99 miles      In-Person, Phone/Virtual   [address] [place] [address]  Language: English  Hours: Mon - Fri 8:00 AM - 4:30 PM  Fees: Free   Phone: (736) 526-4775 Email: sharmin@[facility]. Website: [email or web address]-Services          Important Numbers & Websites       Emergency Services   911  City Services   311  Poison Control   (538) 102-7294  Suicide Prevention Lifeline   (953) 865-3755 (TALK)  Child Abuse Hotline   (335) 797-4516 (4-A-Child)  Sexual Assault Hotline   (961) 459-4679 (HOPE)  National Runaway Safeline   (919) 529-5787 (RUNAWAY)  All-Options Talkline   (616) 610-5024  Substance Abuse Referral   (489) 337-9462 (HELP)

## 2024-01-11 NOTE — PROGRESS NOTES
Bk is a 28 year old who is being evaluated via a billable video visit.      How would you like to obtain your AVS? MyChart  If the video visit is dropped, the invitation should be resent by: Text to cell phone: [phone]  Will anyone else be joining your video visit? No          Assessment & Plan     Shift work sleep disorder  We will trial modafinil. As this is a controlled substance, would prefer patient to follow up in 1 month to discuss efficacy, monitor use. Only use on days when hours are irregular, not daily preferred. Monitor for side effects. CSA to be filled at next visit.  - modafinil (PROVIGIL) 200 MG tablet  Dispense: 90 tablet; Refill: 3       Akila Lopez MD  [facility]    Subjective   Bk is a 28 year old, presenting for the following health issues:  Fatigue        1/11/2024    11:21 AM   Additional Questions   Roomed by Liliane SALINAS CMA   Accompanied by Self       History of Present Illness       Reason for visit:  Wanted to see  if i could try provigil for shift work disorder. I plow sometimes 48hr shifts.    He eats 2-3 servings of fruits and vegetables daily.He consumes 0 sweetened beverage(s) daily.He exercises with enough effort to increase his heart rate 30 to 60 minutes per day.  He exercises with enough effort to increase his heart rate 4 days per week.   He is taking medications regularly.       Patient works shift work, variable hours. During the winter, plows which includes variable hours.   He has had some concerns regarding feeling fatigued while driving and working long days.     Has a coworker on modafinil which is where he heard about this.     Review of Systems   Constitutional, HEENT, cardiovascular, pulmonary, gi and gu systems are negative, except as otherwise noted.      Objective           Vitals:  No vitals were obtained today due to virtual visit.    Physical Exam   GENERAL: Healthy, alert and no distress  EYES: Eyes grossly normal to  inspection.  No discharge or erythema, or obvious scleral/conjunctival abnormalities.  RESP: No audible wheeze, cough, or visible cyanosis.  No visible retractions or increased work of breathing.    SKIN: Visible skin clear. No significant rash, abnormal pigmentation or lesions.  NEURO: Cranial nerves grossly intact.  Mentation and speech appropriate for age.  PSYCH: Mentation appears normal, affect normal/bright, judgement and insight intact, normal speech and appearance well-groomed.          Video-Visit Details    Type of service:  Video Visit     Originating Location (pt. Location): Home    Distant Location (provider location):  On-site  Platform used for Video Visit: [employer]

## 2024-01-14 ENCOUNTER — MYC REFILL (OUTPATIENT)
Dept: FAMILY MEDICINE | Facility: CLINIC | Age: 29
End: 2024-01-14
Payer: COMMERCIAL

## 2024-01-14 DIAGNOSIS — F41.9 ANXIETY: ICD-10-CM

## 2024-01-15 RX ORDER — FLUOXETINE 10 MG/1
10-20 CAPSULE ORAL DAILY
Qty: 60 CAPSULE | Refills: 0 | Status: SHIPPED | OUTPATIENT
Start: 2024-01-15 | End: 2024-02-09

## 2024-01-16 RX ORDER — MODAFINIL 200 MG/1
200 TABLET ORAL DAILY PRN
Qty: 90 TABLET | Refills: 3 | Status: CANCELLED | OUTPATIENT
Start: 2024-01-16

## 2024-02-04 ENCOUNTER — HEALTH MAINTENANCE LETTER (OUTPATIENT)
Age: 29
End: 2024-02-04

## 2024-02-08 ASSESSMENT — ANXIETY QUESTIONNAIRES
3. WORRYING TOO MUCH ABOUT DIFFERENT THINGS: MORE THAN HALF THE DAYS
6. BECOMING EASILY ANNOYED OR IRRITABLE: SEVERAL DAYS
GAD7 TOTAL SCORE: 11
7. FEELING AFRAID AS IF SOMETHING AWFUL MIGHT HAPPEN: MORE THAN HALF THE DAYS
GAD7 TOTAL SCORE: 11
IF YOU CHECKED OFF ANY PROBLEMS ON THIS QUESTIONNAIRE, HOW DIFFICULT HAVE THESE PROBLEMS MADE IT FOR YOU TO DO YOUR WORK, TAKE CARE OF THINGS AT HOME, OR GET ALONG WITH OTHER PEOPLE: VERY DIFFICULT
GAD7 TOTAL SCORE: 11
7. FEELING AFRAID AS IF SOMETHING AWFUL MIGHT HAPPEN: MORE THAN HALF THE DAYS
2. NOT BEING ABLE TO STOP OR CONTROL WORRYING: SEVERAL DAYS
5. BEING SO RESTLESS THAT IT IS HARD TO SIT STILL: NEARLY EVERY DAY
1. FEELING NERVOUS, ANXIOUS, OR ON EDGE: SEVERAL DAYS
8. IF YOU CHECKED OFF ANY PROBLEMS, HOW DIFFICULT HAVE THESE MADE IT FOR YOU TO DO YOUR WORK, TAKE CARE OF THINGS AT HOME, OR GET ALONG WITH OTHER PEOPLE?: VERY DIFFICULT
4. TROUBLE RELAXING: SEVERAL DAYS

## 2024-02-08 ASSESSMENT — PATIENT HEALTH QUESTIONNAIRE - PHQ9
SUM OF ALL RESPONSES TO PHQ QUESTIONS 1-9: 10
SUM OF ALL RESPONSES TO PHQ QUESTIONS 1-9: 10
10. IF YOU CHECKED OFF ANY PROBLEMS, HOW DIFFICULT HAVE THESE PROBLEMS MADE IT FOR YOU TO DO YOUR WORK, TAKE CARE OF THINGS AT HOME, OR GET ALONG WITH OTHER PEOPLE: SOMEWHAT DIFFICULT

## 2024-02-09 ENCOUNTER — VIRTUAL VISIT (OUTPATIENT)
Dept: FAMILY MEDICINE | Facility: CLINIC | Age: 29
End: 2024-02-09
Payer: COMMERCIAL

## 2024-02-09 DIAGNOSIS — G47.26 SHIFT WORK SLEEP DISORDER: ICD-10-CM

## 2024-02-09 DIAGNOSIS — F41.9 ANXIETY: ICD-10-CM

## 2024-02-09 PROCEDURE — 99214 OFFICE O/P EST MOD 30 MIN: CPT | Mod: 95 | Performed by: STUDENT IN AN ORGANIZED HEALTH CARE EDUCATION/TRAINING PROGRAM

## 2024-02-09 RX ORDER — FLUOXETINE 10 MG/1
30 CAPSULE ORAL DAILY
Qty: 90 CAPSULE | Refills: 0 | Status: SHIPPED | OUTPATIENT
Start: 2024-02-09 | End: 2024-03-05

## 2024-02-09 NOTE — PROGRESS NOTES
Bk is a 28 year old who is being evaluated via a billable video visit.      How would you like to obtain your AVS? MyChart  If the video visit is dropped, the invitation should be resent by: Text to cell phone: [phone]  Will anyone else be joining your video visit? No          Assessment & Plan     Shift work sleep disorder  Patient is a very pleasant 28-year-old who presents today for follow-up regarding shiftwork sleep disorder.  He has used the modafinil and this does seem to help quite significantly with being able to adjust, avoid feeling fatigued while driving in particular.  Does not need a refill at this time.  Follow-up as needed.  He will need this more during the summer months, using it every once a while now.    Anxiety  Regarding anxiety, patient felt that the initial increased dosage to 20 mg of Prozac was quite helpful.  Unfortunately, this benefits seem to wear off little.  He is interested in considering going up.  We discussed a trial of 30 mg (will start with 310 mg capsules for now to more easily adjust if side effects occur), follow-up in 1 month with me.  Consider additional therapy with medication such as Wellbutrin.  - FLUoxetine (PROZAC) 10 MG capsule  Dispense: 90 capsule; Refill: 0        Depression Screening Follow Up        2/8/2024     1:24 PM   PHQ   PHQ-9 Total Score 10   Q9: Thoughts of better off dead/self-harm past 2 weeks Not at all       Follow Up Actions Taken  Crisis resource information provided in After Visit Summary         Subjective   Bk is a 28 year old, presenting for the following health issues:  Recheck Medication (Provigil) and Anxiety        2/9/2024    11:06 AM   Additional Questions   Roomed by Laura SHEA   Accompanied by Self     HPI     Medication Followup of Provigil  Taking Medication as prescribed: yes  Side Effects:  None  Medication Helping Symptoms:  yes    Anxiety Follow-Up  How are you doing with your anxiety since your last visit? No change,  would like to increase dose   Are you having other symptoms that might be associated with anxiety? Yes:  some panic attacks  Have you had a significant life event? No   Are you feeling depressed? No  Do you have any concerns with your use of alcohol or other drugs? No    Social History     Tobacco Use    Smoking status: Never    Smokeless tobacco: Never   Vaping Use    Vaping Use: Some days    Substances: Nicotine    Devices: Disposable   Substance Use Topics    Alcohol use: Yes     Comment: Sometimes when going out with freinds    Drug use: Never         12/21/2020     2:28 PM 7/10/2023     6:46 PM 2/8/2024     1:26 PM   SABINA-7 SCORE   Total Score 16 (severe anxiety) 15 (severe anxiety) 11 (moderate anxiety)   Total Score 16 15 11         5/4/2019     1:51 AM 12/21/2020     2:31 PM 2/8/2024     1:24 PM   PHQ   PHQ-9 Total Score 10 9 10   Q9: Thoughts of better off dead/self-harm past 2 weeks Not at all Not at all Not at all         Review of Systems  Constitutional, HEENT, cardiovascular, pulmonary, gi and gu systems are negative, except as otherwise noted.      Objective           Vitals:  No vitals were obtained today due to virtual visit.    Physical Exam   GENERAL: alert and no distress  EYES: Eyes grossly normal to inspection.  No discharge or erythema, or obvious scleral/conjunctival abnormalities.  RESP: No audible wheeze, cough, or visible cyanosis.    SKIN: Visible skin clear. No significant rash, abnormal pigmentation or lesions.  NEURO: Cranial nerves grossly intact.  Mentation and speech appropriate for age.  PSYCH: Appropriate affect, tone, and pace of words          Video-Visit Details    Type of service:  Video Visit     Start 11:15 am  End 11:26 am  Total time 10 min and 37 sec video visit.     Originating Location (pt. Location): Other in vehicle at [facility]    Distant Location (provider location):  On-site  Platform used for Video Visit: Concepcion  Signed Electronically by: Akila Julio  MD Jessica

## 2024-02-20 ENCOUNTER — HOSPITAL ENCOUNTER (EMERGENCY)
Facility: CLINIC | Age: 29
Discharge: HOME | End: 2024-02-20
Attending: EMERGENCY MEDICINE | Admitting: EMERGENCY MEDICINE
Payer: COMMERCIAL

## 2024-02-20 VITALS
OXYGEN SATURATION: 97 % | DIASTOLIC BLOOD PRESSURE: 84 MMHG | SYSTOLIC BLOOD PRESSURE: 124 MMHG | HEART RATE: 85 BPM | RESPIRATION RATE: 18 BRPM | TEMPERATURE: 97.5 F

## 2024-02-20 DIAGNOSIS — R56.9 SEIZURE (H): ICD-10-CM

## 2024-02-20 LAB
ANION GAP SERPL CALCULATED.3IONS-SCNC: 13 MMOL/L (ref 7–15)
BUN SERPL-MCNC: 8.7 MG/DL (ref 6–20)
CALCIUM SERPL-MCNC: 9.6 MG/DL (ref 8.6–10)
CHLORIDE SERPL-SCNC: 100 MMOL/L (ref 98–107)
CREAT SERPL-MCNC: 0.75 MG/DL (ref 0.67–1.17)
DEPRECATED HCO3 PLAS-SCNC: 26 MMOL/L (ref 22–29)
EGFRCR SERPLBLD CKD-EPI 2021: >90 ML/MIN/1.73M2
ERYTHROCYTE [DISTWIDTH] IN BLOOD BY AUTOMATED COUNT: 11.6 % (ref 10–15)
GLUCOSE SERPL-MCNC: 99 MG/DL (ref 70–99)
HCT VFR BLD AUTO: 39.1 % (ref 40–53)
HGB BLD-MCNC: 13.8 G/DL (ref 13.3–17.7)
HOLD SPECIMEN: NORMAL
HOLD SPECIMEN: NORMAL
MCH RBC QN AUTO: 30.2 PG (ref 26.5–33)
MCHC RBC AUTO-ENTMCNC: 35.3 G/DL (ref 31.5–36.5)
MCV RBC AUTO: 86 FL (ref 78–100)
PLATELET # BLD AUTO: 289 10E3/UL (ref 150–450)
POTASSIUM SERPL-SCNC: 3.6 MMOL/L (ref 3.4–5.3)
RBC # BLD AUTO: 4.57 10E6/UL (ref 4.4–5.9)
SODIUM SERPL-SCNC: 139 MMOL/L (ref 135–145)
WBC # BLD AUTO: 6.8 10E3/UL (ref 4–11)

## 2024-02-20 PROCEDURE — 250N000013 HC RX MED GY IP 250 OP 250 PS 637: Performed by: EMERGENCY MEDICINE

## 2024-02-20 PROCEDURE — 36415 COLL VENOUS BLD VENIPUNCTURE: CPT | Performed by: EMERGENCY MEDICINE

## 2024-02-20 PROCEDURE — 93010 ELECTROCARDIOGRAM REPORT: CPT | Performed by: EMERGENCY MEDICINE

## 2024-02-20 PROCEDURE — 93005 ELECTROCARDIOGRAM TRACING: CPT | Performed by: EMERGENCY MEDICINE

## 2024-02-20 PROCEDURE — 85027 COMPLETE CBC AUTOMATED: CPT | Performed by: EMERGENCY MEDICINE

## 2024-02-20 PROCEDURE — 99284 EMERGENCY DEPT VISIT MOD MDM: CPT | Performed by: EMERGENCY MEDICINE

## 2024-02-20 PROCEDURE — 80048 BASIC METABOLIC PNL TOTAL CA: CPT | Performed by: EMERGENCY MEDICINE

## 2024-02-20 PROCEDURE — 99284 EMERGENCY DEPT VISIT MOD MDM: CPT | Mod: 25 | Performed by: EMERGENCY MEDICINE

## 2024-02-20 RX ORDER — HYDROXYZINE HYDROCHLORIDE 25 MG/1
50 TABLET, FILM COATED ORAL ONCE
Status: COMPLETED | OUTPATIENT
Start: 2024-02-20 | End: 2024-02-20

## 2024-02-20 RX ORDER — HYDROXYZINE HYDROCHLORIDE 25 MG/1
25 TABLET, FILM COATED ORAL ONCE
Status: COMPLETED | OUTPATIENT
Start: 2024-02-20 | End: 2024-02-20

## 2024-02-20 RX ADMIN — HYDROXYZINE HYDROCHLORIDE 25 MG: 25 TABLET, FILM COATED ORAL at 22:56

## 2024-02-20 ASSESSMENT — ACTIVITIES OF DAILY LIVING (ADL): ADLS_ACUITY_SCORE: 35

## 2024-02-21 NOTE — ED NOTES
"Pt reports having a recent seizure and was evalutaed by EMS and the refsued to come in. Pt reports not feel right, not eating/no appetite since and a major increase in anxiety. Pt appears anxious and is asking for something to help calm him down. Pt reports stopping his prozac since the seizure he had been on it for 4 months and didn't know if that is what caused it. 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

## 2024-02-21 NOTE — ED PROVIDER NOTES
History     Chief Complaint   Patient presents with    Anxiety     HPI  Fran Staley is a 28 year old male who presents for an episode of loss of consciousness.  The patient says that 3 days prior to his visit.  It was very early in the morning, he was laying in bed and felt jittery and shaky.  He does not remember what happened next but his father says that he heard him fall to the ground and came up and found him shaking on the ground, unresponsive.  This lasted for about a minute.  Afterwards the patient says that he was very confused per his father's report, he does not remember this.  He next remembers sitting on the bed and having EMS they are evaluating him.  They checked his blood glucose it was normal and the patient refused to transfer to the hospital.  He has never had a seizure before.  No tongue biting.  He is felt weak and rundown since then.  He denies any headache, nausea, vomiting, or fevers.  He feels anxious and jittery now.  No chest pain, abdominal pain, vomiting.  He has not been able to eat or drink however because he just does not feel hungry.  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  He has been using kratom frequently to help with his symptoms.  He denies illicit drug use or other stimulant use.    Allergies:  Allergies   Allergen Reactions    Amoxicillin Hives    Penicillins Hives    Sulfacetamide Swelling     eye drops caused eyes to swell shut    Sulfa Antibiotics Unknown       Problem List:    Patient Active Problem List    Diagnosis Date Noted    Pityriasis rosea 01/08/2019     Priority: Medium    SABINA (generalized anxiety disorder) 10/17/2017     Priority: Medium    Bilateral thoracic back pain 07/26/2017     Priority: Medium    Drug allergy 03/20/2012     Priority: Medium     March 20, 2012 hives from amoxicillin              Past Medical History:    No  past medical history on file.    Past Surgical History:    Past Surgical History:   Procedure Laterality Date    none         Family History:    Family History   Problem Relation Age of Onset    Mental Illness Father         ADD    Hypertension Father     Hyperlipidemia Father     Cerebrovascular Disease Maternal Grandmother         Gull blater cancer ? Colon ? Arthritis    Other Cancer Maternal Grandmother     Diabetes Maternal Grandfather     Heart Disease Maternal Grandfather     Colon Cancer Maternal Grandfather     Cerebrovascular Disease Paternal Grandfather     Myocardial Infarction Maternal Uncle        Social History:  Marital Status:  Single [1]  Social History     Tobacco Use    Smoking status: Never    Smokeless tobacco: Never   Vaping Use    Vaping Use: Some days    Substances: Nicotine    Devices: Disposable   Substance Use Topics    Alcohol use: Yes     Comment: Sometimes when going out with freinds    Drug use: Never        Medications:    EPINEPHrine (ANY BX GENERIC EQUIV) 0.3 MG/0.3ML injection 2-pack  FLUoxetine (PROZAC) 10 MG capsule  modafinil (PROVIGIL) 200 MG tablet          Review of Systems    Physical Exam   BP: (!) 152/92  Pulse: 58  Temp: 97.5  F (36.4  C)  Resp: 18  SpO2: 100 %      Physical Exam  Vitals and nursing note reviewed.   Constitutional:       General: He is in acute distress.      Appearance: He is well-developed. He is not diaphoretic.   HENT:      Head: Normocephalic and atraumatic.      Right Ear: External ear normal.      Left Ear: External ear normal.      Nose: Nose normal.   Eyes:      General: No scleral icterus.     Conjunctiva/sclera: Conjunctivae normal.   Cardiovascular:      Rate and Rhythm: Normal rate and regular rhythm.   Pulmonary:      Effort: Pulmonary effort is normal. No respiratory distress.      Breath sounds: No stridor.   Abdominal:      General: There is no distension.      Palpations: Abdomen is soft.   Musculoskeletal:      Cervical back: Normal  range of motion.   Skin:     General: Skin is warm and dry.   Neurological:      Mental Status: He is alert.      GCS: GCS eye subscore is 4. GCS verbal subscore is 5. GCS motor subscore is 6.      Cranial Nerves: Cranial nerves 2-12 are intact.      Motor: Tremor present. No pronator drift.      Coordination: Finger-Nose-Finger Test normal. Rapid alternating movements normal.   Psychiatric:         Behavior: Behavior normal.         ED Course                 Procedures              EKG Interpretation:      Interpreted by Rafael Cardenas MD  Time reviewed: 2335  Symptoms at time of EKG: Unresponsive episode   Rhythm: normal sinus   Rate: normal  Axis: normal  Ectopy: none  Conduction: normal  ST Segments/ T Waves: No ST-T wave changes  Q Waves: none  Comparison to prior: No old EKG available    Clinical Impression: normal EKG      Critical Care time:  none               Results for orders placed or performed during the hospital encounter of 02/20/24 (from the past 24 hour(s))   CBC with platelets   Result Value Ref Range    WBC Count 6.8 4.0 - 11.0 10e3/uL    RBC Count 4.57 4.40 - 5.90 10e6/uL    Hemoglobin 13.8 13.3 - 17.7 g/dL    Hematocrit 39.1 (L) 40.0 - 53.0 %    MCV 86 78 - 100 fL    MCH 30.2 26.5 - 33.0 pg    MCHC 35.3 31.5 - 36.5 g/dL    RDW 11.6 10.0 - 15.0 %    Platelet Count 289 150 - 450 10e3/uL   Basic metabolic panel   Result Value Ref Range    Sodium 139 135 - 145 mmol/L    Potassium 3.6 3.4 - 5.3 mmol/L    Chloride 100 98 - 107 mmol/L    Carbon Dioxide (CO2) 26 22 - 29 mmol/L    Anion Gap 13 7 - 15 mmol/L    Urea Nitrogen 8.7 6.0 - 20.0 mg/dL    Creatinine 0.75 0.67 - 1.17 mg/dL    GFR Estimate >90 >60 mL/min/1.73m2    Calcium 9.6 8.6 - 10.0 mg/dL    Glucose 99 70 - 99 mg/dL   Silva Draw    Narrative    The following orders were created for panel order Silva Draw.  Procedure                               Abnormality         Status                     ---------                                -----------         ------                     Extra Blue Top Tube[[id number]]                              Final result               Extra Red Top Tube[[id number]]                               Final result                 Please view results for these tests on the individual orders.   Extra Blue Top Tube   Result Value Ref Range    Hold Specimen JIC    Extra Red Top Tube   Result Value Ref Range    Hold Specimen JIC        Medications   hydrOXYzine HCl (ATARAX) tablet 25 mg (25 mg Oral $Given 2/20/24 2256)     Or   hydrOXYzine HCl (ATARAX) tablet 50 mg ( Oral See Alternative 2/20/24 2256)       Assessments & Plan (with Medical Decision Making)   28-year-old male who presents for evaluation after unresponsive episode at home.  It sounds more likely to have been a seizure given the report of his more prolonged confusion afterwards.  He has a normal neurologic exam now except for the tremor.  He has not been sleeping well because of his job and has been using modafinil and kratom for his symptoms.  His electrolytes are within normal limits.  There is no signs of trauma.  EKG is sinus rhythm without signs of ischemia or dysrhythmia such as WPW, Brugada syndrome, arrhythmogenic right ventricular dysplasia, or prolonged QT syndrome.  The patient is well-appearing here and is safe for outpatient workup.  I have ordered the patient outpatient MRI and EEG for further evaluation of placed a referral for neurology follow-up.  He is told to return if he has worsening standing episodes or repeated episodes, otherwise follow-up in clinic.  We discussed that he should not drive a vehicle for 3 months or until he is cleared by his doctor.  The patient is in agreement with this plan.    I have reviewed the nursing notes.    I have reviewed the findings, diagnosis, plan and need for follow up with the patient.       New Prescriptions    No medications on file       Final diagnoses:   Seizure (H)       2/20/2024   [facility]  [place] [facility]       Rafael Cardenas MD  02/20/24 4151

## 2024-02-21 NOTE — ED TRIAGE NOTES
Pt reports had a seizure on Saturday. Was not seen in ER. No hx of seizures. Reports since seizure poor PO intake, anxiety. Denied SI. Occasional kratom use.      Triage Assessment (Adult)       Row Name 02/20/24 0391          Triage Assessment    Airway WDL WDL        Respiratory WDL    Respiratory WDL WDL        Skin Circulation/Temperature WDL    Skin Circulation/Temperature WDL WDL        Cardiac WDL    Cardiac WDL WDL        Peripheral/Neurovascular WDL    Peripheral Neurovascular WDL WDL        Cognitive/Neuro/Behavioral WDL    Cognitive/Neuro/Behavioral WDL WDL

## 2024-02-21 NOTE — DISCHARGE INSTRUCTIONS
Stop using kratom as this may have triggered the seizure.  I would stop using the modafinil as well because this could be causing your tremor and may cause difficulty with sleeping which increases your risk of a seizure.  Get plenty of sleep.  Avoid alcohol.  Return to the emergency department for repeated episodes or other concerns.  Follow-up with the outpatient testing, schedule follow-up visit with neurology and with MRI and EEG for further evaluation.

## 2024-03-05 DIAGNOSIS — F41.9 ANXIETY: ICD-10-CM

## 2024-03-05 RX ORDER — FLUOXETINE 10 MG/1
30 CAPSULE ORAL DAILY
Qty: 90 CAPSULE | Refills: 1 | Status: SHIPPED | OUTPATIENT
Start: 2024-03-05

## 2024-03-08 ENCOUNTER — OFFICE VISIT (OUTPATIENT)
Dept: FAMILY MEDICINE | Facility: CLINIC | Age: 29
End: 2024-03-08
Payer: COMMERCIAL

## 2024-03-08 VITALS
BODY MASS INDEX: 26.37 KG/M2 | SYSTOLIC BLOOD PRESSURE: 118 MMHG | TEMPERATURE: 97.8 F | HEIGHT: 68 IN | OXYGEN SATURATION: 97 % | RESPIRATION RATE: 14 BRPM | WEIGHT: 174 LBS | HEART RATE: 74 BPM | DIASTOLIC BLOOD PRESSURE: 76 MMHG

## 2024-03-08 DIAGNOSIS — Z11.4 SCREENING FOR HIV (HUMAN IMMUNODEFICIENCY VIRUS): ICD-10-CM

## 2024-03-08 DIAGNOSIS — Z13.220 SCREENING FOR HYPERLIPIDEMIA: ICD-10-CM

## 2024-03-08 DIAGNOSIS — F41.0 PANIC DISORDER WITHOUT AGORAPHOBIA: ICD-10-CM

## 2024-03-08 DIAGNOSIS — Z00.00 ROUTINE GENERAL MEDICAL EXAMINATION AT A HEALTH CARE FACILITY: Primary | ICD-10-CM

## 2024-03-08 DIAGNOSIS — F41.1 GAD (GENERALIZED ANXIETY DISORDER): ICD-10-CM

## 2024-03-08 DIAGNOSIS — Z82.49 FAMILY HISTORY OF ISCHEMIC HEART DISEASE: ICD-10-CM

## 2024-03-08 DIAGNOSIS — B00.1 COLD SORE: ICD-10-CM

## 2024-03-08 DIAGNOSIS — Z11.59 NEED FOR HEPATITIS C SCREENING TEST: ICD-10-CM

## 2024-03-08 PROCEDURE — 99214 OFFICE O/P EST MOD 30 MIN: CPT | Mod: 25 | Performed by: STUDENT IN AN ORGANIZED HEALTH CARE EDUCATION/TRAINING PROGRAM

## 2024-03-08 PROCEDURE — 99395 PREV VISIT EST AGE 18-39: CPT | Mod: 25 | Performed by: STUDENT IN AN ORGANIZED HEALTH CARE EDUCATION/TRAINING PROGRAM

## 2024-03-08 RX ORDER — VALACYCLOVIR HYDROCHLORIDE 1 G/1
2000 TABLET, FILM COATED ORAL 2 TIMES DAILY
Qty: 12 TABLET | Refills: 2 | Status: SHIPPED | OUTPATIENT
Start: 2024-03-08 | End: 2024-03-09

## 2024-03-08 RX ORDER — ESCITALOPRAM OXALATE 10 MG/1
10 TABLET ORAL DAILY
Qty: 90 TABLET | Refills: 0 | Status: SHIPPED | OUTPATIENT
Start: 2024-03-08

## 2024-03-08 RX ORDER — HYDROXYZINE PAMOATE 25 MG/1
25-50 CAPSULE ORAL 3 TIMES DAILY PRN
Qty: 30 CAPSULE | Refills: 1 | Status: SHIPPED | OUTPATIENT
Start: 2024-03-08

## 2024-03-08 SDOH — HEALTH STABILITY: PHYSICAL HEALTH: ON AVERAGE, HOW MANY DAYS PER WEEK DO YOU ENGAGE IN MODERATE TO STRENUOUS EXERCISE (LIKE A BRISK WALK)?: 5 DAYS

## 2024-03-08 ASSESSMENT — ANXIETY QUESTIONNAIRES
4. TROUBLE RELAXING: MORE THAN HALF THE DAYS
2. NOT BEING ABLE TO STOP OR CONTROL WORRYING: MORE THAN HALF THE DAYS
GAD7 TOTAL SCORE: 14
GAD7 TOTAL SCORE: 14
IF YOU CHECKED OFF ANY PROBLEMS ON THIS QUESTIONNAIRE, HOW DIFFICULT HAVE THESE PROBLEMS MADE IT FOR YOU TO DO YOUR WORK, TAKE CARE OF THINGS AT HOME, OR GET ALONG WITH OTHER PEOPLE: VERY DIFFICULT
6. BECOMING EASILY ANNOYED OR IRRITABLE: SEVERAL DAYS
3. WORRYING TOO MUCH ABOUT DIFFERENT THINGS: MORE THAN HALF THE DAYS
7. FEELING AFRAID AS IF SOMETHING AWFUL MIGHT HAPPEN: MORE THAN HALF THE DAYS
GAD7 TOTAL SCORE: 14
7. FEELING AFRAID AS IF SOMETHING AWFUL MIGHT HAPPEN: MORE THAN HALF THE DAYS
8. IF YOU CHECKED OFF ANY PROBLEMS, HOW DIFFICULT HAVE THESE MADE IT FOR YOU TO DO YOUR WORK, TAKE CARE OF THINGS AT HOME, OR GET ALONG WITH OTHER PEOPLE?: VERY DIFFICULT
5. BEING SO RESTLESS THAT IT IS HARD TO SIT STILL: NEARLY EVERY DAY
1. FEELING NERVOUS, ANXIOUS, OR ON EDGE: MORE THAN HALF THE DAYS

## 2024-03-08 ASSESSMENT — PATIENT HEALTH QUESTIONNAIRE - PHQ9
SUM OF ALL RESPONSES TO PHQ QUESTIONS 1-9: 5
10. IF YOU CHECKED OFF ANY PROBLEMS, HOW DIFFICULT HAVE THESE PROBLEMS MADE IT FOR YOU TO DO YOUR WORK, TAKE CARE OF THINGS AT HOME, OR GET ALONG WITH OTHER PEOPLE: SOMEWHAT DIFFICULT
SUM OF ALL RESPONSES TO PHQ QUESTIONS 1-9: 5

## 2024-03-08 ASSESSMENT — SOCIAL DETERMINANTS OF HEALTH (SDOH): HOW OFTEN DO YOU GET TOGETHER WITH FRIENDS OR RELATIVES?: TWICE A WEEK

## 2024-03-08 ASSESSMENT — PAIN SCALES - GENERAL: PAINLEVEL: NO PAIN (0)

## 2024-03-08 NOTE — PROGRESS NOTES
Preventive Care Visit  [facility]  Akila Lopez MD, Family Medicine  Mar 8, 2024    Assessment & Plan     Routine general medical examination at a health care facility  Patient is a very pleasant 28-year-old gentleman who presents today for annual visit.  While here, we also discussed anxiety.    SABINA (generalized anxiety disorder)  Panic disorder without agoraphobia  Patient has longstanding history of generalized anxiety disorder with panic episodes.  He has fluctuating episodes of panic attacks anywhere from 4 times a week to nothing within 2 weeks.  He has previously been on venlafaxine and most recently on Prozac.  He stopped the Prozac after an episode of loss of consciousness.  He felt that the Prozac was may be helping somewhat, but not significantly.  He is also previously been on BuSpar which caused dizziness and hydroxyzine which made him tired.  We discussed medication options including SSRIs versus SNRIs.  With this episode of loss of consciousness, we did not pursue Wellbutrin today given the concern that he may have had a seizure.  We did opt to start Lexapro, follow-up in 1 and half to 2 months.  Patient is also given a different formulation of hydroxyzine to see if this improves his anxiety better without the significant side effect of fatigue. I did also briefly suggest counseling.   - escitalopram (LEXAPRO) 10 MG tablet; Take 1 tablet (10 mg) by mouth daily  - hydrOXYzine guerda (VISTARIL) 25 MG capsule; Take 1-2 capsules (25-50 mg) by mouth 3 times daily as needed for anxiety  - PRIMARY CARE FOLLOW-UP SCHEDULING; Future    Cold sore  Refill of Valtrex which has worked previously for him for cold sores.  - valACYclovir (VALTREX) 1000 mg tablet; Take 2 tablets (2,000 mg) by mouth 2 times daily for 1 day    Family history of ischemic heart disease  Screening for hyperlipidemia  Patient has not had lipid panel completed.  He does have a strong family history of coronary  "artery disease in a grandparent.  He will have labs done at a future time.  - Lipid panel reflex to direct LDL Fasting; Future    Screening for HIV (human immunodeficiency virus)  Once in a lifetime screen.   - HIV Antigen Antibody Combo; Future    Need for hepatitis C screening test  Once in a lifetime screen.   - Hepatitis C Screen Reflex to HCV RNA Quant and Genotype; Future    Patient has been advised of split billing requirements and indicates understanding: Yes    No LOS data to display   Time spent by me doing chart review, history and exam, documentation and further activities per the note    MED REC REQUIRED  Post Medication Reconciliation Status:     BMI  Estimated body mass index is 26.26 kg/m  as calculated from the following:    Height as of this encounter: 1.734 m (5' 8.25\").    Weight as of this encounter: 78.9 kg (174 lb).     Counseling  Appropriate preventive services were discussed with this patient, including applicable screening as appropriate for fall prevention, nutrition, physical activity, Tobacco-use cessation, weight loss and cognition.  Checklist reviewing preventive services available has been given to the patient.  Reviewed patient's diet, addressing concerns and/or questions.   The patient's PHQ-9 score is consistent with mild depression. He was provided with information regarding depression.       Mireya Bourgeois is a 28 year old, presenting for the following:  Physical, Anxiety (Stopped taking the prozac 3 weeks ago, didn't feel like it was helping), and ER F/U (02/20/2024- seizure )        3/8/2024     4:02 PM   Additional Questions   Roomed by Laura SHEA   Accompanied by son        Health Care Directive  Patient does not have a Health Care Directive or Living Will: Discussed advance care planning with patient; information given to patient to review.    HPI    Anxiety seems the same has had it since he was really young. Gets horrible panic attacks.     Prozac did help. Had " "fewer    No counseling  Panic attacks - sometimes 4 times a week, sometimes 2 week span when not getting them. Especiallly if he is thinking about having one he gets one.   Lasting anywhere from 20 minutes or so to an hour. (The higher anxiety faterwards lasts another 40 minutes or so afte rinitial panic episode).     Times outside of panic attacks still higher anxiety than \"a normal person\"     Sleep at night - some difficulties,   Normally up until like 2 am with overthinking  But also a shift worker.     Hasn't taken anythign to help with sleep except the hydrox in the past.       Breathing techniques since panic attacks, don't seem to help much.     No history of trauma.         7/10/2023     6:46 PM 2/8/2024     1:26 PM 3/8/2024     3:44 PM   SABINA-7 SCORE   Total Score 15 (severe anxiety) 11 (moderate anxiety) 14 (moderate anxiety)   Total Score 15 11 14           12/21/2020     2:31 PM 2/8/2024     1:24 PM 3/8/2024     3:43 PM   PHQ   PHQ-9 Total Score 9 10 5   Q9: Thoughts of better off dead/self-harm past 2 weeks Not at all Not at all Not at all             3/8/2024   General Health   How would you rate your overall physical health? Good   Feel stress (tense, anxious, or unable to sleep) Rather much   (!) STRESS CONCERN      3/8/2024   Nutrition   Three or more servings of calcium each day? Yes   Diet: Regular (no restrictions)    Breakfast skipped   How many servings of fruit and vegetables per day? (!) 0-1   How many sweetened beverages each day? 0-1         3/8/2024   Exercise   Days per week of moderate/strenous exercise 5 days         3/8/2024   Social Factors   Frequency of gathering with friends or relatives Twice a week   Worry food won't last until get money to buy more No   Food not last or not have enough money for food? No   Do you have housing?  Yes   Are you worried about losing your housing? No   Lack of transportation? No   Unable to get utilities (heat,electricity)? No         3/8/2024 " "  Dental   Dentist two times every year? Yes         3/8/2024   TB Screening   Were you born outside of [place]?  No       Today's PHQ-9 Score:       3/8/2024     3:43 PM   PHQ-9 SCORE   PHQ-9 Total Score MyChart 5 (Mild depression)   PHQ-9 Total Score 5         3/8/2024   Substance Use   Alcohol more than 3/day or more than 7/wk No   Do you use any other substances recreationally? No     Social History     Tobacco Use    Smoking status: Never    Smokeless tobacco: Never   Vaping Use    Vaping Use: Some days    Substances: Nicotine    Devices: Disposable   Substance Use Topics    Alcohol use: Yes     Comment: Sometimes when going out with freinds    Drug use: Never             3/8/2024   One time HIV Screening   Previous HIV test? No         3/8/2024   STI Screening   New sexual partner(s) since last STI/HIV test? No         3/8/2024   Contraception/Family Planning   Questions about contraception or family planning No        Reviewed and updated as needed this visit by Provider                        Review of Systems  Constitutional, HEENT, cardiovascular, pulmonary, gi and gu systems are negative, except as otherwise noted.     Objective    Exam  /76 (BP Location: Right arm, Patient Position: Sitting, Cuff Size: Adult Regular)   Pulse 74   Temp 97.8  F (36.6  C) (Tympanic)   Resp 14   Ht 1.734 m (5' 8.25\")   Wt 78.9 kg (174 lb)   SpO2 97%   BMI 26.26 kg/m     Estimated body mass index is 26.26 kg/m  as calculated from the following:    Height as of this encounter: 1.734 m (5' 8.25\").    Weight as of this encounter: 78.9 kg (174 lb).    Physical Exam  GENERAL: alert and no distress  EYES: Eyes grossly normal to inspection, and conjunctivae and sclerae normal  HENT: ear canals and TM's normal, nose and mouth without ulcers or lesions. Upper and lower lips with abrasions consistent with cold sores.   NECK: no adenopathy, no asymmetry, masses, or scars  RESP: lungs clear to auscultation - no rales, rhonchi or " wheezes  CV: regular rate and rhythm, normal S1 S2, no S3 or S4, no murmur, click or rub, no peripheral edema  ABDOMEN: soft, nontender, no hepatosplenomegaly, no masses and bowel sounds normal  MS: no gross musculoskeletal defects noted, no edema  SKIN: no suspicious lesions or rashes  NEURO: Normal strength and tone, mentation intact and speech normal  PSYCH: mentation appears normal, affect normal/bright      Signed Electronically by: Akila Lopez MD    Answers submitted by the patient for this visit:  Patient Health Questionnaire (Submitted on 3/8/2024)  If you checked off any problems, how difficult have these problems made it for you to do your work, take care of things at home, or get along with other people?: Somewhat difficult  PHQ9 TOTAL SCORE: 5  SABINA-7 (Submitted on 3/8/2024)  SABINA 7 TOTAL SCORE: 14

## 2024-03-08 NOTE — PATIENT INSTRUCTIONS
Start with 1/2 tablet (5 mg) for 2 weeks, then increase to 1 full tablet (10 mg).   Follow up in 1.5-2 months on anxiety (sooner if need be)  Preventive Care Advice   This is general advice given by our system to help you stay healthy. However, your care team may have specific advice just for you. Please talk to your care team about your preventive care needs.  Nutrition  Eat 5 or more servings of fruits and vegetables each day.  Try wheat bread, brown rice and whole grain pasta (instead of white bread, rice, and pasta).  Get enough calcium and vitamin D. Check the label on foods and aim for 100% of the RDA (recommended daily allowance).  Lifestyle  Exercise at least 150 minutes each week   (30 minutes a day, 5 days a week).  Do muscle strengthening activities 2 days a week. These help control your weight and prevent disease.  No smoking.  Wear sunscreen to prevent skin cancer.  Have a dental exam and cleaning every 6 months.  Yearly exams  See your health care team every year to talk about:  Any changes in your health.  Any medicines your care team has prescribed.  Preventive care, family planning, and ways to prevent chronic diseases.  Shots (vaccines)   HPV shots (up to age 26), if you've never had them before.  Hepatitis B shots (up to age 59), if you've never had them before.  COVID-19 shot: Get this shot when it's due.  Flu shot: Get a flu shot every year.  Tetanus shot: Get a tetanus shot every 10 years.  Pneumococcal, hepatitis A, and RSV shots: Ask your care team if you need these based on your risk.  Shingles shot (for age 50 and up).  General health tests  Diabetes screening:  Starting at age 35, Get screened for diabetes at least every 3 years.  If you are younger than age 35, ask your care team if you should be screened for diabetes.  Cholesterol test: At age 39, start having a cholesterol test every 5 years, or more often if advised.  Bone density scan (DEXA): At age 50, ask your care team if you  should have this scan for osteoporosis (brittle bones).  Hepatitis C: Get tested at least once in your life.  STIs (sexually transmitted infections)  Before age 24: Ask your care team if you should be screened for STIs.  After age 24: Get screened for STIs if you're at risk. You are at risk for STIs (including HIV) if:  You are sexually active with more than one person.  You don't use condoms every time.  You or a partner was diagnosed with a sexually transmitted infection.  If you are at risk for HIV, ask about PrEP medicine to prevent HIV.  Get tested for HIV at least once in your life, whether you are at risk for HIV or not.  Cancer screening tests  Cervical cancer screening: If you have a cervix, begin getting regular cervical cancer screening tests at age 21. Most people who have regular screenings with normal results can stop after age 65. Talk about this with your provider.  Breast cancer scan (mammogram): If you've ever had breasts, begin having regular mammograms starting at age 40. This is a scan to check for breast cancer.  Colon cancer screening: It is important to start screening for colon cancer at age 45.  Have a colonoscopy test every 10 years (or more often if you're at risk) Or, ask your provider about stool tests like a FIT test every year or Cologuard test every 3 years.  To learn more about your testing options, visit: https://www.P[employer] Engine/168818.pdf.  For help making a decision, visit: https://bit.ly/qb49240.  Prostate cancer screening test: If you have a prostate and are age 55 to 69, ask your provider if you would benefit from a yearly prostate cancer screening test.  Lung cancer screening: If you are a current or former smoker age 50 to 80, ask your care team if ongoing lung cancer screenings are right for you.  For informational purposes only. Not to replace the advice of your health care provider. Copyright   2023 [employer]. All rights reserved. Clinically reviewed by the  [facility] Transitions Program. Flig[employer] [id number] - REV 01/24.    Learning About Stress  What is stress?     Stress is your body's response to a hard situation. Your body can have a physical, emotional, or mental response. Stress is a fact of life for most people, and it affects everyone differently. What causes stress for you may not be stressful for someone else.  A lot of things can cause stress. You may feel stress when you go on a job interview, take a test, or run a race. This kind of short-term stress is normal and even useful. It can help you if you need to work hard or react quickly. For example, stress can help you finish an important job on time.  Long-term stress is caused by ongoing stressful situations or events. Examples of long-term stress include long-term health problems, ongoing problems at work, or conflicts in your family. Long-term stress can harm your health.  How does stress affect your health?  When you are stressed, your body responds as though you are in danger. It makes hormones that speed up your heart, make you breathe faster, and give you a burst of energy. This is called the fight-or-flight stress response. If the stress is over quickly, your body goes back to normal and no harm is done.  But if stress happens too often or lasts too long, it can have bad effects. Long-term stress can make you more likely to get sick, and it can make symptoms of some diseases worse. If you tense up when you are stressed, you may develop neck, shoulder, or low back pain. Stress is linked to high blood pressure and heart disease.  Stress also harms your emotional health. It can make you tucker, tense, or depressed. Your relationships may suffer, and you may not do well at work or school.  What can you do to manage stress?  You can try these things to help manage stress:   Do something active. Exercise or activity can help reduce stress. Walking is a great way to get started. Even everyday  activities such as housecleaning or yard work can help.  Try yoga or gerson chi. These techniques combine exercise and meditation. You may need some training at first to learn them.  Do something you enjoy. For example, listen to music or go to a movie. Practice your hobby or do volunteer work.  Meditate. This can help you relax, because you are not worrying about what happened before or what may happen in the future.  Do guided imagery. Imagine yourself in any setting that helps you feel calm. You can use online videos, books, or a teacher to guide you.  Do breathing exercises. For example:  From a standing position, bend forward from the waist with your knees slightly bent. Let your arms dangle close to the floor.  Breathe in slowly and deeply as you return to a standing position. Roll up slowly and lift your head last.  Hold your breath for just a few seconds in the standing position.  Breathe out slowly and bend forward from the waist.  Let your feelings out. Talk, laugh, cry, and express anger when you need to. Talking with supportive friends or family, a counselor, or a matias leader about your feelings is a healthy way to relieve stress. Avoid discussing your feelings with people who make you feel worse.  Write. It may help to write about things that are bothering you. This helps you find out how much stress you feel and what is causing it. When you know this, you can find better ways to cope.  What can you do to prevent stress?  You might try some of these things to help prevent stress:  Manage your time. This helps you find time to do the things you want and need to do.  Get enough sleep. Your body recovers from the stresses of the day while you are sleeping.  Get support. Your family, friends, and community can make a difference in how you experience stress.  Limit your news feed. Avoid or limit time on social media or news that may make you feel stressed.  Do something active. Exercise or activity can help  "reduce stress. Walking is a great way to get started.  Where can you learn more?  Go to https://www.SuperCloud.net/patiented  Enter N032 in the search box to learn more about \"Learning About Stress.\"  Current as of: February 26, 2023               Content Version: 13.8    1431-2438 [employer].   Care instructions adapted under license by your healthcare professional. If you have questions about a medical condition or this instruction, always ask your healthcare professional. [employer] disclaims any warranty or liability for your use of this information.      Learning About Depression Screening  What is depression screening?  Depression screening is a way to see if you have depression symptoms. It may be done by a doctor or counselor. It's often part of a routine checkup. That's because your mental health is just as important as your physical health.  Depression is a mental health condition that affects how you feel, think, and act. You may:  Have less energy.  Lose interest in your daily activities.  Feel sad and grouchy for a long time.  Depression is very common. It affects people of all ages.  Many things can lead to depression. Some people become depressed after they have a stroke or find out they have a major illness like cancer or heart disease. The death of a loved one or a breakup may lead to depression. It can run in families. Most experts believe that a combination of inherited genes and stressful life events can cause it.  What happens during screening?  You may be asked to fill out a form about your depression symptoms. You and the doctor will discuss your answers. The doctor may ask you more questions to learn more about how you think, act, and feel.  What happens after screening?  If you have symptoms of depression, your doctor will talk to you about your options.  Doctors usually treat depression with medicines or counseling. Often, combining the two works best. Many " "people don't get help because they think that they'll get over the depression on their own. But people with depression may not get better unless they get treatment.  The cause of depression is not well understood. There may be many factors involved. But if you have depression, it's not your fault.  A serious symptom of depression is thinking about death or suicide. If you or someone you care about talks about this or about feeling hopeless, get help right away.  It's important to know that depression can be treated. Medicine, counseling, and self-care may help.  Where can you learn more?  Go to https://www.[employer].co.net/patiented  Enter T185 in the search box to learn more about \"Learning About Depression Screening.\"  Current as of: June 25, 2023               Content Version: 13.8    7614-5865 [employer].   Care instructions adapted under license by your healthcare professional. If you have questions about a medical condition or this instruction, always ask your healthcare professional. [employer] disclaims any warranty or liability for your use of this information.      "

## 2024-03-23 ENCOUNTER — E-VISIT (OUTPATIENT)
Dept: URGENT CARE | Facility: CLINIC | Age: 29
End: 2024-03-23
Payer: COMMERCIAL

## 2024-03-23 DIAGNOSIS — Z20.828 EXPOSURE TO INFLUENZA: Primary | ICD-10-CM

## 2024-03-23 PROCEDURE — 99207 PR NON-BILLABLE SERV PER CHARTING: CPT | Performed by: PHYSICIAN ASSISTANT

## 2024-03-23 NOTE — PATIENT INSTRUCTIONS
Bk,    Thank you for choosing us for your care. I have placed an order for a lab test(s). View your full visit summary for details by clicking on the link below. You can schedule a lab only appointment right here in [employer], or by calling [phone].    You will receive your lab results and next steps via [employer] when the lab results return.    Sincerely,  Lázaro Fuller PA-C

## 2024-07-29 DIAGNOSIS — G47.26 SHIFT WORK SLEEP DISORDER: ICD-10-CM

## 2024-07-31 RX ORDER — MODAFINIL 200 MG/1
200 TABLET ORAL DAILY
Qty: 90 TABLET | Refills: 0 | Status: SHIPPED | OUTPATIENT
Start: 2024-07-31

## 2024-08-01 ENCOUNTER — TELEPHONE (OUTPATIENT)
Dept: FAMILY MEDICINE | Facility: CLINIC | Age: 29
End: 2024-08-01
Payer: COMMERCIAL

## 2024-08-01 NOTE — TELEPHONE ENCOUNTER
Prior Authorization Retail Medication Request    Medication/Dose: modafinil 200 mg tablet  Diagnosis and ICD code (if different than what is on RX):    New/renewal/insurance change PA/secondary ins. PA:  Previously Tried and Failed:    Rationale:      Insurance   Primary: cover my meds ID: [id number]  Insurance ID:      Secondary (if applicable):  Insurance ID:      Pharmacy Information (if different than what is on RX)  Name: [facility]    Phone:    Fax:

## 2024-08-02 NOTE — TELEPHONE ENCOUNTER
Prior Authorization Approval    Authorization Effective Date: 8/2/2024  Authorization Expiration Date: 2/2/2025  Medication: MONDAFINIL   Reference #:     Insurance Company: [employer] ([facility]) - Phone [phone] Fax [phone]  Which Pharmacy is filling the prescription (Not needed for infusion/clinic administered): [id number] - [place] [place] - [address]  Pharmacy Notified: Yes  Patient Notified: Instructed pharmacy to notify patient when script is ready to /ship.

## 2024-08-02 NOTE — TELEPHONE ENCOUNTER
Central Prior Authorization Team   Phone: [phone]    PA Initiation    Medication: MONDAFINIL   Insurance Company: [employer] ([facility]) - Phone [phone] Fax [phone]  Pharmacy Filling the Rx: [id number] - [place], [place] - [address]  Filling Pharmacy Phone: [phone]  Filling Pharmacy Fax:    Start Date: 8/2/2024

## 2025-01-13 ENCOUNTER — ANCILLARY PROCEDURE (OUTPATIENT)
Dept: GENERAL RADIOLOGY | Facility: CLINIC | Age: 30
End: 2025-01-13
Payer: COMMERCIAL

## 2025-01-13 ENCOUNTER — OFFICE VISIT (OUTPATIENT)
Dept: FAMILY MEDICINE | Facility: CLINIC | Age: 30
End: 2025-01-13
Payer: COMMERCIAL

## 2025-01-13 VITALS
HEIGHT: 69 IN | BODY MASS INDEX: 26.07 KG/M2 | OXYGEN SATURATION: 96 % | DIASTOLIC BLOOD PRESSURE: 84 MMHG | RESPIRATION RATE: 16 BRPM | TEMPERATURE: 97.5 F | WEIGHT: 176 LBS | SYSTOLIC BLOOD PRESSURE: 124 MMHG | HEART RATE: 83 BPM

## 2025-01-13 DIAGNOSIS — S92.502A CLOSED FRACTURE OF PHALANX OF LEFT FIFTH TOE, INITIAL ENCOUNTER: ICD-10-CM

## 2025-01-13 DIAGNOSIS — S99.922A FOOT INJURY, LEFT, INITIAL ENCOUNTER: Primary | ICD-10-CM

## 2025-01-13 DIAGNOSIS — S99.922A FOOT INJURY, LEFT, INITIAL ENCOUNTER: ICD-10-CM

## 2025-01-13 PROBLEM — L42 PITYRIASIS ROSEA: Status: RESOLVED | Noted: 2019-01-08 | Resolved: 2025-01-13

## 2025-01-13 PROCEDURE — 99213 OFFICE O/P EST LOW 20 MIN: CPT

## 2025-01-13 PROCEDURE — 73630 X-RAY EXAM OF FOOT: CPT | Mod: TC | Performed by: RADIOLOGY

## 2025-01-13 ASSESSMENT — PAIN SCALES - GENERAL: PAINLEVEL_OUTOF10: MODERATE PAIN (5)

## 2025-01-13 NOTE — PATIENT INSTRUCTIONS
Patients with pain inadequately controlled with lindsay taping, may require a walking boot or a short-leg walking cast with a toe plate   The injured toe should be immobilized until point tenderness has resolved, which usually requires four to six weeks.     Application of ice and elevation of the foot above the level of the heart during the first few days after the injury will decrease inflammation, swelling, and pain. Care should be taken not to cause a cold-induced injury when applying ice. This can generally be accomplished by placing a layer of bandage or cloth between the ice and the skin, and limiting the application time to approximately 15 minutes at a time, followed by a respite of 20 to 30 minutes.

## 2025-01-13 NOTE — PROGRESS NOTES
Assessment & Plan     Foot injury, left, initial encounter  Fracture of the 5th middle phalanx of left foot.   Provided walking shoe and explained buddy taping to the 4th toe if able to, help to promote alignment and stability as bone heals.   Continue use of NSAIDs such as naproxen twice a day with food and plenty of water  Ice and elevation   See AVS  -Podiatry referral  - XR Foot Left G/E 3 Views; Future      Follow up as needed if symptoms worsen or do not improve.         Mireya Bourgeois is a 29 year old, presenting for the following health issues:  Musculoskeletal Problem (Left foot; stubbed toe on 01/07/2025)        1/13/2025     1:24 PM   Additional Questions   Roomed by Janet NUNES   Accompanied by None         1/13/2025     1:24 PM   Patient Reported Additional Medications   Patient reports taking the following new medications None     History of Present Illness       Reason for visit:  Pinkey toe on left foot  Symptom onset:  3-7 days ago  Symptoms include:  Really bad pain in my pinkey toe  Symptom intensity:  Severe  Symptom progression:  Staying the same  Had these symptoms before:  No  What makes it worse:  Walking around  What makes it better:  No   He is taking medications regularly.       Concern - Left foot pain  Onset: 01/07/2025  Description: Pain from the pink toe down into the foot. Stubbed toe on corner of door frame.  Intensity: moderate  Progression of Symptoms:  same  Accompanying Signs & Symptoms: Aching pain that progresses to a sharp/ shooting pain when putting weight on the foot  Previous history of similar problem: n/a  Precipitating factors:        Worsened by: weight baring   Alleviating factors:        Improved by: None  Therapies tried and outcome: Icing, elevating, tylenol/ ibuprofen    The 5th toe was clipped and stubbed leading to hyperabduction of the toe away from the 4th toe.  Bruising and swelling  Has been unable to bear weight on the entire lateral side of the left foot.  "  Reports no numbness/tingling   No previous foot injury or surgery.         Review of Systems  Constitutional, HEENT, cardiovascular, pulmonary, gi and gu systems are negative, except as otherwise noted.      Objective    /84   Pulse 83   Temp 97.5  F (36.4  C) (Tympanic)   Resp 16   Ht 1.753 m (5' 9\")   Wt 79.8 kg (176 lb)   SpO2 96%   BMI 25.99 kg/m    Body mass index is 25.99 kg/m .  Physical Exam  Constitutional:       General: He is not in acute distress.     Appearance: Normal appearance.   Musculoskeletal:      Right foot: Normal.      Left foot: Decreased range of motion. Normal capillary refill. Swelling, tenderness and bony tenderness present. Normal pulse.        Legs:       Comments: Bruise with edema noted, pain to palpation of 5th toe and along the tuberosity of metatarsal bone and tuberosity left foot. Unable to move 5th toe to active ROM, significant pain with passive ROM.   No tenderness to medial tuberosity and full ROM toes 1-4.      Skin:     General: Skin is warm and dry.      Capillary Refill: Capillary refill takes less than 2 seconds.   Neurological:      General: No focal deficit present.      Mental Status: He is alert and oriented to person, place, and time.      Motor: No weakness.   Psychiatric:         Mood and Affect: Mood normal.         Behavior: Behavior normal.            CXR Left foot - Reviewed and interpreted by me: 5th middle phalange. No metatarsal fracture.         Signed Electronically by: NAS Olsen, CNP, DNP       DME (Durable Medical Equipment) Orders and Documentation  Orders Placed This Encounter   Procedures    Ankle/Foot Bracing Supplies Order Post-op Shoe; Left        The patient was assessed and it was determined the patient is in need of the following listed DME Supplies/Equipment. Please complete supporting documentation below to demonstrate medical necessity.         "

## 2025-01-14 ENCOUNTER — PATIENT OUTREACH (OUTPATIENT)
Dept: CARE COORDINATION | Facility: CLINIC | Age: 30
End: 2025-01-14
Payer: COMMERCIAL

## 2025-01-16 ENCOUNTER — PATIENT OUTREACH (OUTPATIENT)
Dept: CARE COORDINATION | Facility: CLINIC | Age: 30
End: 2025-01-16
Payer: COMMERCIAL

## 2025-02-06 ENCOUNTER — PATIENT OUTREACH (OUTPATIENT)
Dept: CARE COORDINATION | Facility: CLINIC | Age: 30
End: 2025-02-06
Payer: COMMERCIAL

## 2025-02-20 ENCOUNTER — PATIENT OUTREACH (OUTPATIENT)
Dept: CARE COORDINATION | Facility: CLINIC | Age: 30
End: 2025-02-20
Payer: COMMERCIAL

## 2025-03-08 ENCOUNTER — E-VISIT (OUTPATIENT)
Dept: FAMILY MEDICINE | Facility: CLINIC | Age: 30
End: 2025-03-08
Payer: COMMERCIAL

## 2025-03-08 DIAGNOSIS — L91.8 SKIN TAG: ICD-10-CM

## 2025-03-08 DIAGNOSIS — I78.1 NEVUS, NON-NEOPLASTIC: Primary | ICD-10-CM

## 2025-03-08 PROCEDURE — 99207 PR NON-BILLABLE SERV PER CHARTING: CPT | Performed by: STUDENT IN AN ORGANIZED HEALTH CARE EDUCATION/TRAINING PROGRAM

## 2025-03-10 NOTE — TELEPHONE ENCOUNTER
Provider E-Visit time total (minutes):  Less than 5 minutes.     Left message for patient to call back to schedule. Sari Baig MA

## 2025-04-02 ENCOUNTER — HOSPITAL ENCOUNTER (EMERGENCY)
Facility: CLINIC | Age: 30
Discharge: HOME | End: 2025-04-02
Attending: FAMILY MEDICINE | Admitting: FAMILY MEDICINE
Payer: COMMERCIAL

## 2025-04-02 VITALS
TEMPERATURE: 97.7 F | DIASTOLIC BLOOD PRESSURE: 85 MMHG | OXYGEN SATURATION: 100 % | SYSTOLIC BLOOD PRESSURE: 138 MMHG | RESPIRATION RATE: 16 BRPM | HEART RATE: 67 BPM

## 2025-04-02 DIAGNOSIS — F19.10 SUBSTANCE ABUSE (H): ICD-10-CM

## 2025-04-02 PROCEDURE — 99283 EMERGENCY DEPT VISIT LOW MDM: CPT | Performed by: FAMILY MEDICINE

## 2025-04-02 PROCEDURE — 99284 EMERGENCY DEPT VISIT MOD MDM: CPT | Performed by: FAMILY MEDICINE

## 2025-04-02 RX ORDER — CLONIDINE HYDROCHLORIDE 0.3 MG/1
0.3 TABLET ORAL EVERY 8 HOURS PRN
Qty: 30 TABLET | Refills: 0 | Status: SHIPPED | OUTPATIENT
Start: 2025-04-02

## 2025-04-02 ASSESSMENT — COLUMBIA-SUICIDE SEVERITY RATING SCALE - C-SSRS
2. HAVE YOU ACTUALLY HAD ANY THOUGHTS OF KILLING YOURSELF IN THE PAST MONTH?: NO
1. IN THE PAST MONTH, HAVE YOU WISHED YOU WERE DEAD OR WISHED YOU COULD GO TO SLEEP AND NOT WAKE UP?: NO
1. IN THE PAST MONTH, HAVE YOU WISHED YOU WERE DEAD OR WISHED YOU COULD GO TO SLEEP AND NOT WAKE UP?: NO
6. HAVE YOU EVER DONE ANYTHING, STARTED TO DO ANYTHING, OR PREPARED TO DO ANYTHING TO END YOUR LIFE?: NO
6. HAVE YOU EVER DONE ANYTHING, STARTED TO DO ANYTHING, OR PREPARED TO DO ANYTHING TO END YOUR LIFE?: NO
2. HAVE YOU ACTUALLY HAD ANY THOUGHTS OF KILLING YOURSELF IN THE PAST MONTH?: NO

## 2025-04-02 NOTE — ED TRIAGE NOTES
Pt has been using kratom for 7 years, attempting to quit for the last 2 years, states for the last week and a half he has had muscle cramps, starting in his thighs now in his arms, worse today.      Triage Assessment (Adult)       Row Name 04/02/25 2713          Triage Assessment    Airway WDL WDL        Respiratory WDL    Respiratory WDL WDL        Skin Circulation/Temperature WDL    Skin Circulation/Temperature WDL WDL        Cardiac WDL    Cardiac WDL WDL        Peripheral/Neurovascular WDL    Peripheral Neurovascular WDL WDL        Cognitive/Neuro/Behavioral WDL    Cognitive/Neuro/Behavioral WDL WDL

## 2025-04-02 NOTE — ED PROVIDER NOTES
HPI   Patient is a 29-year-old male presenting with concerns for kratom withdrawal.  The patient has been using approximately twelve 22 g tablets a day over the past 7 years.  When he tries to stop or reduce the amount that he is using he experiences extreme withdrawal symptoms including cramping pain.  He is not currently experiencing withdrawal.      Allergies:  Allergies   Allergen Reactions    Amoxicillin Hives    Penicillins Hives    Sulfacetamide Swelling     eye drops caused eyes to swell shut    Sulfa Antibiotics Unknown     Problem List:    Patient Active Problem List    Diagnosis Date Noted    SABINA (generalized anxiety disorder) 10/17/2017     Priority: Medium    Bilateral thoracic back pain 07/26/2017     Priority: Medium    Drug allergy 03/20/2012     Priority: Medium     March 20, 2012 hives from amoxicillin            Past Medical History:    No past medical history on file.  Past Surgical History:    Past Surgical History:   Procedure Laterality Date    none       Family History:    Family History   Problem Relation Age of Onset    Anxiety Disorder Mother     Mental Illness Father         ADHD    Hypertension Father     Hyperlipidemia Father         ADHD    Cerebrovascular Disease Maternal Grandmother         Gull blater cancer ? Colon ? Arthritis    Other Cancer Maternal Grandmother     Heart Disease Maternal Grandfather     Colon Cancer Maternal Grandfather     Cerebrovascular Disease Paternal Grandfather     Myocardial Infarction Maternal Uncle     Diabetes No family hx of         80% of them are treateded fir ADHD     Social History:  Marital Status:  Single [1]  Social History     Tobacco Use    Smoking status: Never     Passive exposure: Never    Smokeless tobacco: Never   Vaping Use    Vaping status: Every Day    Substances: Nicotine, Flavoring    Devices: Disposable   Substance Use Topics    Alcohol use: Yes     Comment: Sometimes when going out with freinds    Drug use: Never       Medications:    cloNIDine (CATAPRES) 0.3 MG tablet  EPINEPHrine (ANY BX GENERIC EQUIV) 0.3 MG/0.3ML injection 2-pack  escitalopram (LEXAPRO) 10 MG tablet  hydrOXYzine guerda (VISTARIL) 25 MG capsule  valACYclovir (VALTREX) 1000 mg tablet      Review of Systems   All other systems reviewed and are negative.      PE   BP: 138/85  Pulse: 67  Temp: 97.7  F (36.5  C)  Resp: 16  SpO2: 100 %  Physical Exam  Vitals and nursing note reviewed.   Constitutional:       General: He is not in acute distress.  HENT:      Head: Atraumatic.      Right Ear: External ear normal.      Left Ear: External ear normal.      Nose: Nose normal.      Mouth/Throat:      Mouth: Mucous membranes are moist.      Pharynx: Oropharynx is clear.   Eyes:      General: No scleral icterus.     Extraocular Movements: Extraocular movements intact.      Conjunctiva/sclera: Conjunctivae normal.      Pupils: Pupils are equal, round, and reactive to light.   Cardiovascular:      Rate and Rhythm: Normal rate.   Pulmonary:      Effort: Pulmonary effort is normal. No respiratory distress.   Musculoskeletal:         General: Normal range of motion.      Cervical back: Normal range of motion.   Skin:     General: Skin is warm and dry.   Neurological:      Mental Status: He is alert and oriented to person, place, and time.   Psychiatric:         Behavior: Behavior normal.         ED COURSE and [facility]   1859.  Patient experiencing withdrawal symptoms from kratom, though not currently.  Referral order placed for substance abuse follow-up.  Suboxone likely needed.  We talked about clonidine, prescription provided.    Electronic medical chart reviewed, including medical problems, medications, medical allergies, social history.  Recent hospitalizations and surgical procedures reviewed.  Recent clinic visits and consultations reviewed.  Recent labs and test results reviewed.  Nursing notes reviewed.    The patient, their parent if applicable, and/or their medical decision  maker(s) and I have reviewed all of the available historical information, applicable PMH, physical exam findings, and objective diagnostic data gathered during this ED visit.  We then discussed all work-up options and then together agreed upon the course taken during this visit.  The ultimate disposition and plan was a cooperative decision made between myself and the patient, their parent if applicable, and/or their legal decision maker(s).  The risks and benefits of all decisions made during this visit were discussed to the best of my abilities given the circumstances, and all parties are understanding of the pertinent ramifications of these decisions.      LABS  Labs Ordered and Resulted from Time of ED Arrival to Time of ED Departure - No data to display    IMAGING  Images reviewed by me.  Radiology report also reviewed.  No orders to display       Procedures    Medications - No data to display      IMPRESSION       ICD-10-CM    1. Substance abuse (H)  F19.10 [facility] [place]               Medication List        Started      cloNIDine 0.3 MG tablet  Commonly known as: CATAPRES  0.3 mg, Oral, EVERY 8 HOURS PRN                                  Biju Kahn MD  04/02/25 5784

## 2025-04-02 NOTE — DISCHARGE INSTRUCTIONS
RETURN TO THE EMERGENCY ROOM FOR THE FOLLOWING:    At anytime for any concern.    FOLLOW UP:    Substance use disorder referral order was placed at the time of discharge, expect a phone call within the next few days double scheduling.  Requiring Suboxone?    TREATMENT RECOMMENDATIONS:    Consider clonidine for withdrawal symptoms.    NURSE ADVICE LINE:  [phone] or [phone]

## 2025-04-02 NOTE — ED PROVIDER NOTES
There were no vitals taken for this visit.    Fran Staley is a 29-year-old male presenting with complaints of bilateral upper and lower extremity muscle cramping over the last week.  No known recent viral illnesses.  Denies fevers but endorses body aches and chills.  Given patient's history, I recommended he/she be evaluated in the emergency department.  We discussed that he/she will likely require further testing and/or treatment beyond the capabilities of the current urgent care setting including labs and potential imaging.  Patient expresses understanding of this and agreement with the plan.  I have explained what could happen if they choose to not have the treatment/transfer.        Kristina Lombardo PA-C  04/02/25 1820

## 2025-04-03 ENCOUNTER — OFFICE VISIT (OUTPATIENT)
Dept: FAMILY MEDICINE | Facility: CLINIC | Age: 30
End: 2025-04-03
Payer: COMMERCIAL

## 2025-04-03 ENCOUNTER — TELEPHONE (OUTPATIENT)
Dept: ADDICTION MEDICINE | Facility: CLINIC | Age: 30
End: 2025-04-03

## 2025-04-03 VITALS
OXYGEN SATURATION: 98 % | RESPIRATION RATE: 18 BRPM | SYSTOLIC BLOOD PRESSURE: 108 MMHG | DIASTOLIC BLOOD PRESSURE: 76 MMHG | HEIGHT: 69 IN | WEIGHT: 190.6 LBS | HEART RATE: 53 BPM | BODY MASS INDEX: 28.23 KG/M2

## 2025-04-03 DIAGNOSIS — D64.9 ANEMIA, UNSPECIFIED TYPE: ICD-10-CM

## 2025-04-03 DIAGNOSIS — R68.2 DRY MOUTH: ICD-10-CM

## 2025-04-03 DIAGNOSIS — M79.10 MUSCLE PAIN: Primary | ICD-10-CM

## 2025-04-03 LAB
ALBUMIN SERPL BCG-MCNC: 4.5 G/DL (ref 3.5–5.2)
ALBUMIN UR-MCNC: NEGATIVE MG/DL
ALP SERPL-CCNC: 68 U/L (ref 40–150)
ALT SERPL W P-5'-P-CCNC: 21 U/L (ref 0–70)
ANION GAP SERPL CALCULATED.3IONS-SCNC: 10 MMOL/L (ref 7–15)
APPEARANCE UR: CLEAR
AST SERPL W P-5'-P-CCNC: 23 U/L (ref 0–45)
BASOPHILS # BLD AUTO: 0 10E3/UL (ref 0–0.2)
BASOPHILS NFR BLD AUTO: 1 %
BILIRUB SERPL-MCNC: 0.2 MG/DL
BILIRUB UR QL STRIP: NEGATIVE
BUN SERPL-MCNC: 16.1 MG/DL (ref 6–20)
CALCIUM SERPL-MCNC: 9.8 MG/DL (ref 8.8–10.4)
CHLORIDE SERPL-SCNC: 103 MMOL/L (ref 98–107)
CK SERPL-CCNC: 147 U/L (ref 39–308)
COLOR UR AUTO: YELLOW
CREAT SERPL-MCNC: 0.9 MG/DL (ref 0.67–1.17)
EGFRCR SERPLBLD CKD-EPI 2021: >90 ML/MIN/1.73M2
EOSINOPHIL # BLD AUTO: 0.1 10E3/UL (ref 0–0.7)
EOSINOPHIL NFR BLD AUTO: 1 %
ERYTHROCYTE [DISTWIDTH] IN BLOOD BY AUTOMATED COUNT: 12.2 % (ref 10–15)
EST. AVERAGE GLUCOSE BLD GHB EST-MCNC: 97 MG/DL
GLUCOSE SERPL-MCNC: 98 MG/DL (ref 70–99)
GLUCOSE UR STRIP-MCNC: NEGATIVE MG/DL
HBA1C MFR BLD: 5 % (ref 0–5.6)
HCO3 SERPL-SCNC: 26 MMOL/L (ref 22–29)
HCT VFR BLD AUTO: 36.2 % (ref 40–53)
HGB BLD-MCNC: 12.6 G/DL (ref 13.3–17.7)
HGB UR QL STRIP: NEGATIVE
IMM GRANULOCYTES # BLD: 0 10E3/UL
IMM GRANULOCYTES NFR BLD: 0 %
IRON BINDING CAPACITY (ROCHE): 266 UG/DL (ref 240–430)
IRON SATN MFR SERPL: 33 % (ref 15–46)
IRON SERPL-MCNC: 88 UG/DL (ref 61–157)
KETONES UR STRIP-MCNC: NEGATIVE MG/DL
LDH SERPL L TO P-CCNC: 146 U/L (ref 0–250)
LEUKOCYTE ESTERASE UR QL STRIP: NEGATIVE
LYMPHOCYTES # BLD AUTO: 2.6 10E3/UL (ref 0.8–5.3)
LYMPHOCYTES NFR BLD AUTO: 46 %
MAGNESIUM SERPL-MCNC: 1.7 MG/DL (ref 1.7–2.3)
MCH RBC QN AUTO: 30.1 PG (ref 26.5–33)
MCHC RBC AUTO-ENTMCNC: 34.8 G/DL (ref 31.5–36.5)
MCV RBC AUTO: 86 FL (ref 78–100)
MONOCYTES # BLD AUTO: 0.4 10E3/UL (ref 0–1.3)
MONOCYTES NFR BLD AUTO: 7 %
MONOCYTES NFR BLD AUTO: NEGATIVE %
NEUTROPHILS # BLD AUTO: 2.6 10E3/UL (ref 1.6–8.3)
NEUTROPHILS NFR BLD AUTO: 45 %
NITRATE UR QL: NEGATIVE
PH UR STRIP: 6 [PH] (ref 5–7)
PLATELET # BLD AUTO: 263 10E3/UL (ref 150–450)
POTASSIUM SERPL-SCNC: 4.3 MMOL/L (ref 3.4–5.3)
PROT SERPL-MCNC: 7.1 G/DL (ref 6.4–8.3)
RBC # BLD AUTO: 4.19 10E6/UL (ref 4.4–5.9)
RBC #/AREA URNS AUTO: NORMAL /HPF
SODIUM SERPL-SCNC: 139 MMOL/L (ref 135–145)
SP GR UR STRIP: 1.02 (ref 1–1.03)
TSH SERPL DL<=0.005 MIU/L-ACNC: 2.46 UIU/ML (ref 0.3–4.2)
UROBILINOGEN UR STRIP-ACNC: 0.2 E.U./DL
WBC # BLD AUTO: 5.7 10E3/UL (ref 4–11)
WBC #/AREA URNS AUTO: NORMAL /HPF

## 2025-04-03 ASSESSMENT — PAIN SCALES - GENERAL: PAINLEVEL_OUTOF10: MODERATE PAIN (5)

## 2025-04-03 NOTE — CONFIDENTIAL NOTE
Pt seen in ED for ongoing kratom use. Clonidine prescribed on discharge.    Please call patient to setup with RC appt, thanks.    Jeff Wolfe MD

## 2025-04-03 NOTE — PROGRESS NOTES
Assessment & Plan     Muscle pain  Patient describes muscle cramps primarily in his legs and arms, sometimes all over in his abdomen.  Associated with fatigue, dry mouth and excess thirst.  No new medications does use kratom regularly for many years he has not had his dose cut down or missed doses.  Family history of diabetes.  Will update blood work as below  - Comprehensive metabolic panel (BMP + Alb, Alk Phos, ALT, AST, Total. Bili, TP)  - CBC with platelets and differential  - Mononucleosis screen  - TSH with free T4 reflex  - CK total  - Lactate Dehydrogenase  - Magnesium  - Comprehensive metabolic panel (BMP + Alb, Alk Phos, ALT, AST, Total. Bili, TP)  - CBC with platelets and differential  - Mononucleosis screen  - TSH with free T4 reflex  - CK total  - Lactate Dehydrogenase  - Magnesium    Dry mouth  Lab work as below  - Comprehensive metabolic panel (BMP + Alb, Alk Phos, ALT, AST, Total. Bili, TP)  - Hemoglobin A1c  - UA with Microscopic reflex to Culture - lab collect  - Comprehensive metabolic panel (BMP + Alb, Alk Phos, ALT, AST, Total. Bili, TP)  - Hemoglobin A1c  - UA with Microscopic reflex to Culture - lab collect  - UA Microscopic with Reflex to Culture    Anemia, unspecified type  Very mild anemia on exam will add on iron panel  - Iron and iron binding capacity  - Iron and iron binding capacity      The longitudinal plan of care for the diagnosis(es)/condition(s) as documented were addressed during this visit. Due to the added complexity in care, I will continue to support Bk in the subsequent management and with ongoing continuity of care.    MED REC REQUIRED  Post Medication Reconciliation Status:         Subjective   Bk is a 29 year old, presenting for the following health issues:  Musculoskeletal Problem and ER F/U        4/3/2025     4:34 PM   Additional Questions   Roomed by Kalyn SHEA MA   Accompanied by Self     History of Present Illness       Reason for visit:  Body pain all other and  "cramps    He eats 0-1 servings of fruits and vegetables daily.He consumes 1 sweetened beverage(s) daily.He exercises with enough effort to increase his heart rate 60 or more minutes per day.  He exercises with enough effort to increase his heart rate 6 days per week.   He is taking medications regularly.      Myalgias-  For the past 1.5 weeks, started in his thighs as a burning pain, then shooting pains.  Now cramping.  Then went into arms.  Says whole body hurts    ED/UC Followup:    Facility:  [facility]  Date of visit: 4/2/2025  Reason for visit: muscle cramps, starting in his thighs, now in his arms  Current Status: Still having symptoms    Did take clonidine once yesterday      Review of Systems  Constitutional, HEENT, cardiovascular, pulmonary, gi and gu systems are negative, except as otherwise noted.      Objective    /76 (BP Location: Right arm, Patient Position: Chair, Cuff Size: Adult Regular)   Pulse 53   Resp 18   Ht 1.753 m (5' 9\")   Wt 86.5 kg (190 lb 9.6 oz)   SpO2 98%   BMI 28.15 kg/m    Body mass index is 28.15 kg/m .  Physical Exam   GENERAL: alert and no distress  EYES: Eyes grossly normal to inspection, PERRL and conjunctivae and sclerae normal  HENT: ear canals and TM's normal, nose and mouth without ulcers or lesions  NECK: no adenopathy, no asymmetry, masses, or scars  RESP: lungs clear to auscultation - no rales, rhonchi or wheezes  CV: regular rate and rhythm, normal S1 S2, no S3 or S4, no murmur, click or rub, no peripheral edema  ABDOMEN: soft, nontender, no hepatosplenomegaly, no masses and bowel sounds normal  MS: no gross musculoskeletal defects noted, no edema  SKIN: no suspicious lesions or rashes  NEURO: Normal strength and tone, mentation intact and speech normal  PSYCH: mentation appears normal, affect normal/bright            Signed Electronically by: Samra Longo, DO    "

## 2025-04-03 NOTE — TELEPHONE ENCOUNTER
RN reviewed provider directive and chart. Per ED notes, patient reported using Kratom for the past 7 years.    RN called patient but the call went to . RN left a message to call the clinic back regarding a referral and ask to speak with a nurse. Let patient know he can also schedule an appointment when he calls back if he would like. RN also encouraged patient to review the MyC message RN sent.    Awaiting return call or response to MyC message.    Aaliyah Alford RN on 4/3/2025 at 3:44 PM    [facility]  [facility] [address], [address]   [place], [place], [address]  Phone: [phone]  Fax: [phone]    Open Monday-Friday  Closed over lunch hour  Walk in hours: 9am-11:30am and 12:30-3pm    *For [facility] providers, if you'd like to have Recovery Clinic staff reach out to a patient, enter ambulatory order Adult Mental Health  Referral #[phone] for Addiction Medicine or [facility] Referral #[phone] for Addiction Medicine, as appropriate.

## 2025-04-04 NOTE — TELEPHONE ENCOUNTER
I have attempted to contact this patient by phone with the following results: message left to return my call with

## 2025-04-04 NOTE — TELEPHONE ENCOUNTER
Patient has reviewed MyC message RN sent with Recovery Clinic contact/hours.     Routing to Recovery Clinic scheduler to attempt to reach out to patient.     Aaliyah Alford RN on 4/4/2025 at 9:36 AM

## 2025-04-07 ENCOUNTER — VIRTUAL VISIT (OUTPATIENT)
Dept: FAMILY MEDICINE | Facility: CLINIC | Age: 30
End: 2025-04-07
Payer: COMMERCIAL

## 2025-04-07 DIAGNOSIS — F11.20 MODERATE OPIOID USE DISORDER (H): Primary | ICD-10-CM

## 2025-04-07 PROCEDURE — 98006 SYNCH AUDIO-VIDEO EST MOD 30: CPT | Performed by: FAMILY MEDICINE

## 2025-04-07 RX ORDER — CYCLOBENZAPRINE HCL 10 MG
10 TABLET ORAL 3 TIMES DAILY PRN
Qty: 10 TABLET | Refills: 0 | Status: SHIPPED | OUTPATIENT
Start: 2025-04-07

## 2025-04-07 RX ORDER — BUPRENORPHINE HYDROCHLORIDE AND NALOXONE HYDROCHLORIDE DIHYDRATE 2; .5 MG/1; MG/1
TABLET SUBLINGUAL
Qty: 28 TABLET | Refills: 0 | Status: SHIPPED | OUTPATIENT
Start: 2025-04-07

## 2025-04-07 RX ORDER — CLONIDINE HYDROCHLORIDE 0.1 MG/1
0.1 TABLET ORAL 3 TIMES DAILY PRN
Qty: 20 TABLET | Refills: 0 | Status: SHIPPED | OUTPATIENT
Start: 2025-04-07

## 2025-04-07 RX ORDER — GABAPENTIN 300 MG/1
300 CAPSULE ORAL 4 TIMES DAILY PRN
Qty: 20 CAPSULE | Refills: 0 | Status: SHIPPED | OUTPATIENT
Start: 2025-04-07

## 2025-04-07 RX ORDER — ONDANSETRON 4 MG/1
4 TABLET, ORALLY DISINTEGRATING ORAL EVERY 8 HOURS PRN
Qty: 20 TABLET | Refills: 0 | Status: SHIPPED | OUTPATIENT
Start: 2025-04-07

## 2025-04-07 NOTE — PROGRESS NOTES
Bk is a 29 year old who is being evaluated via a billable video visit.    How would you like to obtain your AVS? MyChart  If the video visit is dropped, the invitation should be resent by: Text to cell phone: [phone]  Will anyone else be joining your video visit? No    Assessment & Plan     Moderate opioid use disorder (H)  Plan for 24 hour cessation of kratom then initiate suboxone. Medications sent to help with withdrawal, close follow up in 3-4 days to reassess.   - naloxone (NARCAN) 4 MG/0.1ML nasal spray  Dispense: 2 each; Refill: 3  - buprenorphine-naloxone (SUBOXONE) 2-0.5 MG SUBL sublingual tablet  Dispense: 28 tablet; Refill: 0  - cloNIDine (CATAPRES) 0.1 MG tablet  Dispense: 20 tablet; Refill: 0  - ondansetron (ZOFRAN ODT) 4 MG ODT tab  Dispense: 20 tablet; Refill: 0  - gabapentin (NEURONTIN) 300 MG capsule  Dispense: 20 capsule; Refill: 0  - cyclobenzaprine (FLEXERIL) 10 MG tablet  Dispense: 10 tablet; Refill: 0    The longitudinal plan of care for the diagnosis(es)/condition(s) as documented were addressed during this visit. Due to the added complexity in care, I will continue to support Bk in the subsequent management and with ongoing continuity of care.    Subjective   Bk is a 29 year old, presenting for the following health issues:  Patient Request        4/7/2025     8:23 AM   Additional Questions   Roomed by Kalyn SHEA MA   Accompanied by Self     HPI        Would like to talk about quitting Kratom    2 tablets every 3 hours-22 mg each 7 hydroxy     14    Review of Systems  Constitutional, HEENT, cardiovascular, pulmonary, gi and gu systems are negative, except as otherwise noted.      Objective           Vitals:  No vitals were obtained today due to virtual visit.    Physical Exam   GENERAL: alert and no distress  EYES: Eyes grossly normal to inspection.  No discharge or erythema, or obvious scleral/conjunctival abnormalities.  RESP: No audible wheeze, cough, or visible cyanosis.    SKIN:  Visible skin clear. No significant rash, abnormal pigmentation or lesions.  NEURO: Cranial nerves grossly intact.  Mentation and speech appropriate for age.  PSYCH: Appropriate affect, tone, and pace of words          Video-Visit Details    Type of service:  Video Visit   Originating Location (pt. Location): Home    Distant Location (provider location):  Off-site  Platform used for Video Visit: Concepcion  Signed Electronically by: Samra Longo DO

## 2025-04-07 NOTE — PATIENT INSTRUCTIONS
Starting Suboxone at Home  2 mg Suboxone (buprenorphine and naloxone)  Am I ready to begin Suboxone?   Wait to take Suboxone until you feel very sick from withdrawal symptoms. Taking Suboxone too soon can make withdrawal worse.  You may be ready to start Suboxone if all of these statements are true for you:  It has been 12 hours since you used pain pills prescribed by a doctor; and  It has been 24 hours since you used all other opioids; and  It has been 48 to 72 hours since you used methadone; and  You have at least 3 of these withdrawal symptoms: Yawning, sweating, chills, enlarged pupils, joint and bone aches, shaking, twitching, anxious, irritable, or restless (can't sit still), fast heartbeat, bumpy skin (gooseflesh), watery eyes, runny nose, loss of appetite, stomach cramps, nausea, vomiting, diarrhea.  How do I take Suboxone?  Important things to know before you start:   Suboxone comes either in tablet form or as a piece of film. Each tablet or piece of film is 2 mg.  Dry your hands before touching the medicine.  Let the medicine dissolve under the tongue or on the inside of your cheek. It won't work if swallowed.  If you feel worse after starting Suboxone, STOP taking it and contact your care team.  Day 1  Note: If you received medicine in the 4 mg strength, you'll need to cut it in half.  Take a 2 mg dose. Wait 1 hour.  If you still feel sick, take another 2 mg dose. Then wait 1 hour.  If you're still sick, take another 2 mg. Wait 4 to 8 hours.  If you still feel unwell, take the last 2 mg dose.  Day 2  If you took 2 mg total on Day 1, take 2 mg today.  If you took 4 mg total on Day 1, take 4 mg today.  If you took 6 mg total on Day 1, then today:  Take 4 mg in the morning. Wait 8 hours and watch for symptoms of withdrawal.  If your symptoms return, take 2 mg.  If you took a total of 8 mg on Day 1, then today:  Take 4 mg in the morning; and  Take 4 mg in the evening.  Day 3 and beyond  Repeat the  "instructions under Day 2 until your next visit with your care team.  If you still have withdrawal symptoms or major cravings, call your care team.    For informational purposes only. Not to replace the advice of your health care provider. Copyright   2023 [facility]. All rights reserved. Clinically reviewed by the Opioid Use Disorder Care Map Team. [employer] [id number] - REV 10/24.  Community Support for Substance Use Disorder  Information for Family and Friends  How is addiction different from substance use disorder?   Addiction is a treatable, chronic medical disease that involves complex interactions among brain circuits, genetics, the environment, and a person's own life experiences.  Put simply, substance use disorder (GERALDINE) is \"use despite harm.\" Once substance use becomes harmful for a person, it can be tough to change the related patterns of behavior. This process of change is often labeled recovery.   Social support creates sustainable change  Building relationships and creating sustainable change are key features of healing. Social support can come from friends and family. It's also commonly found in community groups that provide an understanding, supportive environment.  It may help to think of addiction like any other chronic illness--for example, obesity. While it's possible to lose weight without support, it isn't ideal. People often have more success with diet and exercise when they feel ownership over their actions and the consequences. This is also true of someone in recovery. We can all remember a time that someone lifted us up and helped us move forward. Friends, family, and community support can all play this role in recovery.   \"Hope, the belief that these challenges and conditions can be overcome, is the foundation of recovery.\"  - [facility], 2023   What is needed for successful recovery?  Recovery addresses the whole person. It includes their strengths, talents, coping abilities, " "resources, key values, and a sense of community. It is supported by peers, friends, and family members.  Hope. The cornerstone of recovery is believing that challenges and conditions can be overcome.  Motivation for change. Recovery needs to occur internally. Everyone has their own motivation for change. Without motivation, it is unlikely a person will create and sustain change.  Social and community support. When you find people who encourage change and help you stay motivated, personal growth can speed up.  Resources. Continuing the process of change requires energy and resources. It is important that your loved one meet their basic needs for safety and nourishment. Their healthcare team can tell you about resources to help to build a solid foundation for change. For example, mental health treatment are often critical to engaging with others  Willingness to learn and adapt. There is no \"one-size fits all\" approach to recovery. Expecting things to go a certain way can have disappointing results, or may even lead to some negative outcomes. Recovery involves learning how well a given group fits one's needs, then deciding whether to continue or to find a new approach.  How can I find a free support group meeting near me?   Below is a list of some links to try for support. Providing this list doesn't mean we endorse a specific organization. There are many groups not mentioned here that can also provide helpful support.  AA - Alcoholics Anonymous  Visit https://www.[employer].SnapDash/home for online meetings that include other Anonymous groups.  AA [place]  https://a[place].org  Offers online and in-person meetings.   Narcotics Anonymous  https://www.nam[place].org  Information about local meetings and worldwide, online meetings.  Al-Anon and Alateen  http://al-anon.org  https://al-anon.org/newcomers/teen-corner-alateen  Offers help and hope to anyone who is affected by alcoholism in a family member or friend. " Includes electronic meetings and searchable, local meeting listings.   Recovery groups  Other recovery groups include but are not limited to:   Refuge Recovery  Celebrate Recovery  Other anonymous programs  Drug-specific (marijuana, crystal meth, cocaine)  Emotions Anonymous  CODA (Co-dependence anonymous)  Overeaters Anonymous  To learn more about finding a support group near you, visit: https://www.[employer]/find-a-support-group-meeting-near-you-82319.  For informational purposes only. Not to replace the advice of your health care provider. Copyright   2023 [facility]. All rights reserved. Clinically reviewed by the Opioid Use Disorder Care Map Team. [employer] 580163- 03/23.

## 2025-04-10 ENCOUNTER — OFFICE VISIT (OUTPATIENT)
Dept: FAMILY MEDICINE | Facility: CLINIC | Age: 30
End: 2025-04-10
Payer: COMMERCIAL

## 2025-04-10 VITALS
BODY MASS INDEX: 28.14 KG/M2 | OXYGEN SATURATION: 100 % | RESPIRATION RATE: 16 BRPM | WEIGHT: 190 LBS | HEIGHT: 69 IN | SYSTOLIC BLOOD PRESSURE: 116 MMHG | DIASTOLIC BLOOD PRESSURE: 82 MMHG | HEART RATE: 63 BPM | TEMPERATURE: 98.2 F

## 2025-04-10 DIAGNOSIS — Z11.4 SCREENING FOR HIV (HUMAN IMMUNODEFICIENCY VIRUS): Primary | ICD-10-CM

## 2025-04-10 DIAGNOSIS — Z11.59 NEED FOR HEPATITIS C SCREENING TEST: ICD-10-CM

## 2025-04-10 DIAGNOSIS — F11.21 MODERATE OPIOID USE DISORDER, IN EARLY REMISSION (H): ICD-10-CM

## 2025-04-10 DIAGNOSIS — F11.21 MODERATE OPIOID USE DISORDER, IN EARLY REMISSION (H): Primary | ICD-10-CM

## 2025-04-10 LAB — CREAT UR-MCNC: 92 MG/DL

## 2025-04-10 PROCEDURE — 99214 OFFICE O/P EST MOD 30 MIN: CPT | Performed by: FAMILY MEDICINE

## 2025-04-10 PROCEDURE — 3079F DIAST BP 80-89 MM HG: CPT | Performed by: FAMILY MEDICINE

## 2025-04-10 PROCEDURE — 1126F AMNT PAIN NOTED NONE PRSNT: CPT | Performed by: FAMILY MEDICINE

## 2025-04-10 PROCEDURE — 3074F SYST BP LT 130 MM HG: CPT | Performed by: FAMILY MEDICINE

## 2025-04-10 PROCEDURE — G2211 COMPLEX E/M VISIT ADD ON: HCPCS | Performed by: FAMILY MEDICINE

## 2025-04-10 PROCEDURE — G0482 DRUG TEST DEF 15-21 CLASSES: HCPCS | Performed by: FAMILY MEDICINE

## 2025-04-10 RX ORDER — BUPRENORPHINE HYDROCHLORIDE AND NALOXONE HYDROCHLORIDE DIHYDRATE 2; .5 MG/1; MG/1
2 TABLET SUBLINGUAL 2 TIMES DAILY
Qty: 28 TABLET | Refills: 0 | Status: SHIPPED | OUTPATIENT
Start: 2025-04-10 | End: 2025-04-17

## 2025-04-10 ASSESSMENT — PAIN SCALES - GENERAL: PAINLEVEL_OUTOF10: NO PAIN (0)

## 2025-04-10 NOTE — LETTER
Opioid / Opioid Plus Controlled Substance Agreement    This is an agreement between you and your provider about the safe and appropriate use of controlled substance/opioids prescribed by your care team. Controlled substances are medicines that can cause physical and mental dependence (abuse).    There are strict laws about having and using these medicines. We here at [facility] are committing to working with you in your efforts to get better. To support you in this work, we ll help you schedule regular office appointments for medicine refills. If we must cancel or change your appointment for any reason, we ll make sure you have enough medicine to last until your next appointment.     As a Provider, I will:  Listen carefully to your concerns and treat you with respect.   Recommend a treatment plan that I believe is in your best interest. This plan may involve therapies other than opioid pain medication.   Talk with you often about the possible benefits, and the risk of harm of any medicine that we prescribe for you.   Provide a plan on how to taper (discontinue or go off) using this medicine if the decision is made to stop its use.    As a Patient, I understand that opioid(s):   Are a controlled substance prescribed by my care team to help me function or work and manage my condition(s).   Are strong medicines and can cause serious side effects such as:  Drowsiness, which can seriously affect my driving ability  A lower breathing rate, enough to cause death  Harm to my thinking ability   Depression   Abuse of and addiction to this medicine  Need to be taken exactly as prescribed. Combining opioids with certain medicines or chemicals (such as illegal drugs, sedatives, sleeping pills, and benzodiazepines) can be dangerous or even fatal. If I stop opioids suddenly, I may have severe withdrawal symptoms.  Do not work for all types of pain nor for all patients. If they re not helpful, I may be asked to stop  them.      The risks, benefits and side effects of these medicine(s) were explained to me. I agree that:  I will take part in other treatments as advised by my care team. This may be psychiatry or counseling, physical therapy, behavioral therapy, group treatment or a referral to a specialist.     I will keep all my appointments. I understand that this is part of the monitoring of opioids. My care team may require an office visit for EVERY opioid/controlled substance refill. If I miss appointments or don t follow instructions, my care team may stop my medicine.    I will take my medicines as prescribed. I will not change the dose or schedule unless my care team tells me to. There will be no refills if I run out early.     I may be asked to come to the clinic and complete a urine drug test or complete a pill count at any time. If I don t give a urine sample or participate in a pill count, the care team may stop my medicine.    I will only receive prescriptions from this clinic for chronic pain. If I am treated by another provider for acute pain issues, I will tell them that I am taking opioid pain medication for chronic pain and that I have a treatment agreement with this provider. I will inform my [facility] care team within one business day if I am given a prescription for any pain medication by another healthcare provider. My [facility] care team can contact other providers and pharmacists about my use of any medicines.    It is up to me to make sure that I don t run out of my medicines on weekends or holidays. If my care team is willing to refill my opioid prescription without a visit, I must request refills only during office hours. Refills may take up to 3 business days to process. I will use one pharmacy to fill all my opioid and other controlled substance prescriptions. I will notify the clinic about any changes to my insurance or medication availability.    I am responsible for my prescriptions.  If the medicine/prescription is lost, stolen or destroyed, it will not be replaced. I also agree not to share controlled substance medicines with anyone.    I am aware I should not use any illegal or recreational drugs. I agree not to drink alcohol unless my care team says I can.       If I enroll in the [place] [facility] program, I will tell my care team prior to my next refill.     I will tell my care team right away if I become pregnant, have a new medical problem treated outside of my regular clinic, or have a change in my medications.    I understand that this medicine can affect my thinking, judgment and reaction time. Alcohol and drugs affect the brain and body, which can affect the safety of my driving. Being under the influence of alcohol or drugs can affect my decision-making, behaviors, personal safety, and the safety of others. Driving while impaired (DWI) can occur if a person is driving, operating, or in physical control of a car, motorcycle, boat, snowmobile, ATV, motorbike, off-road vehicle, or any other motor vehicle ([place] Statute 169A.20). I understand the risk if I choose to drive or operate any vehicle or machinery.    I understand that if I do not follow any of the conditions above, my prescriptions or treatment may be stopped or changed.          Opioids  What You Need to Know    What are opioids?   Opioids are pain medicines that must be prescribed by a doctor. They are also known as narcotics.     Examples are:   morphine (MS Contin, Ana)  oxycodone (Oxycontin)  oxycodone and acetaminophen (Percocet)  hydrocodone and acetaminophen (Vicodin, Norco)   fentanyl patch (Duragesic)   hydromorphone (Dilaudid)   methadone  codeine (Tylenol #3)     What do opioids do well?   Opioids are best for severe short-term pain such as after a surgery or injury. They may work well for cancer pain. They may help some people with long-lasting (chronic) pain.     What do opioids NOT do well?   Opioids  never get rid of pain entirely, and they don t work well for most patients with chronic pain. Opioids don t reduce swelling, one of the causes of pain.                                    Other ways to manage chronic pain and improve function include:     Treat the health problem that may be causing pain  Anti-inflammation medicines, which reduce swelling and tenderness, such as ibuprofen (Advil, Motrin) or naproxen (Aleve)  Acetaminophen (Tylenol)  Antidepressants and anti-seizure medicines, especially for nerve pain  Topical treatments such as patches or creams  Injections or nerve blocks  Chiropractic or osteopathic treatment  Acupuncture, massage, deep breathing, meditation, visual imagery, aromatherapy  Use heat or ice at the pain site  Physical therapy   Exercise  Stop smoking  Take part in therapy       Risks and side effects     Talk to your doctor before you start or decide to keep taking opioids. Possible side effects include:    Lowering your breathing rate enough to cause death  Overdose, including death, especially if taking higher than prescribed doses  Worse depression symptoms; less pleasure in things you usually enjoy  Feeling tired or sluggish  Slower thoughts or cloudy thinking  Being more sensitive to pain over time; pain is harder to control  Trouble sleeping or restless sleep  Changes in hormone levels (for example, less testosterone)  Changes in sex drive or ability to have sex  Constipation  Unsafe driving  Itching and sweating  Dizziness  Nausea, throwing up and dry mouth    What else should I know about opioids?    Opioids may lead to dependence, tolerance, or addiction.    Dependence means that if you stop or reduce the medicine too quickly, you will have withdrawal symptoms. These include loose poop (diarrhea), jitters, flu-like symptoms, nervousness and tremors. Dependence is not the same as addiction.                     Tolerance means needing higher doses over time to get the same  effect. This may increase the chance of serious side effects.    Addiction is when people improperly use a substance that harms their body, their mind or their relations with others. Use of opiates can cause a relapse of addiction if you have a history of drug or alcohol abuse.    People who have used opioids for a long time may have a lower quality of life, worse depression, higher levels of pain and more visits to doctors.    You can overdose on opioids. Take these steps to lower your risk of overdose:    Recognize the signs:  Signs of overdose include decrease or loss of consciousness (blackout), slowed breathing, trouble waking up and blue lips. If someone is worried about overdose, they should call 911.    Talk to your doctor about Narcan (naloxone).   If you are at risk for overdose, you may be given a prescription for Narcan. This medicine very quickly reverses the effects of opioids.   If you overdose, a friend or family member can give you Narcan while waiting for the ambulance. They need to know the signs of overdose and how to give Narcan.     Don't use alcohol or street drugs.   Taking them with opioids can cause death.    Do not take any of these medicines unless your doctor says it s OK. Taking these with opioids can cause death:  Benzodiazepines, such as lorazepam (Ativan), alprazolam (Xanax) or diazepam (Valium)  Muscle relaxers, such as cyclobenzaprine (Flexeril)  Sleeping pills like zolpidem (Ambien)   Other opioids      How to keep you and other people safe while taking opioids:    Never share your opioids with others.  Opioid medicines are regulated by the Drug Enforcement Agency ([place]). Selling or sharing medications is a criminal act.    2. Be sure to store opioids in a secure place, locked up if possible. Young children can easily swallow them and overdose.    3. When you are traveling with your medicines, keep them in the original bottles. If you use a pill box, be sure you also carry a copy  of your medicine list from your clinic or pharmacy.    4. Safe disposal of opioids    Most pharmacies have places to get rid of medicine, called disposal kiosks. Medicine disposal options are also available in every [facility]. Search your county and  medication disposal  to find more options. You can find more details at:  [email or web address]     I agree that my provider, clinic care team, and pharmacy may work with any city, state or federal law enforcement agency that investigates the misuse, sale, or other diversion of my controlled medicine. I will allow my provider to discuss my care with, or share a copy of, this agreement with any other treating provider, pharmacy or emergency room where I receive care.    I have read this agreement and have asked questions about anything I did not understand.    _______________________________________________________  Patient Signature - Fran Staley _____________________                   Date     _______________________________________________________  Provider Signature - Samra Longo,    _____________________                   Date     _______________________________________________________  Witness Signature (required if provider not present while patient signing)   _____________________                   Date

## 2025-04-10 NOTE — PROGRESS NOTES
"  Assessment & Plan     Screening for HIV (human immunodeficiency virus)  ***    Need for hepatitis C screening test  ***    Moderate opioid use disorder, in early remission (H)  ***  - Drug Confirmation Panel Urine with Creat - lab collect  - buprenorphine-naloxone (SUBOXONE) 2-0.5 MG SUBL sublingual tablet  Dispense: 28 tablet; Refill: 0  - Drug Confirmation Panel Urine with Creat - lab collect            BMI  Estimated body mass index is 28.06 kg/m  as calculated from the following:    Height as of this encounter: 1.753 m (5' 9\").    Weight as of this encounter: 86.2 kg (190 lb).   {Weight Management Plan needed for ACO:536000}      {FOLLOW UP PLANS (Optional) Includes COVID19 Treatment Plan:968527}    Mireya Bourgeois is a 29 year old, presenting for the following health issues:  Drug Problem        4/10/2025     6:00 PM   Additional Questions   Roomed by Kalyn SHEA MA   Accompanied by Self     History of Present Illness       Reason for visit:  Body pain all other and cramps    He eats 0-1 servings of fruits and vegetables daily.He consumes 1 sweetened beverage(s) daily.He exercises with enough effort to increase his heart rate 60 or more minutes per day.  He exercises with enough effort to increase his heart rate 6 days per week.   He is taking medications regularly.              Started suboxone on Tuesday      Current Narcotic Use/History:  Which opioid(s) are you currently using, that are not already on your med list?: Kratom  How do you use your drug of choice? Oral  What is your estimated total dose (mg if pills, grams of heroin) per day? Extract 700 hydroxy.  22mg 14-16 pills a day  When did you last use? Last use was Midnight on 4/7  Have you ever tried to quit on your own? YES-   What have you done to try quiting in the past? Cold-turkey and taper  What was the longest period of time you have been sober from opioids?: 6 hours  When and how did you start using opioids? 2018    Other Substance/Psychiatric " "History:  Have you been struggling with any other mental health symptoms?: No  Any other drug use other than opioids?: None  Do you struggle with any other addictive behaviors (sex, gambling, internet, shopping, TV etc): No  Are you sexually active?: yes, single partner, contraception - none       Family History:  Does anyone in your family have a history of a use disorder? YES- Maternal side, alcohol    Opioid Use Disorder Criteria:        No data to display              PHQ Score:      12/21/2020     2:31 PM 2/8/2024     1:24 PM 3/8/2024     3:43 PM   PHQ   PHQ-9 Total Score 9 10 5   Q9: Thoughts of better off dead/self-harm past 2 weeks Not at all Not at all Not at all     GAD7 Score:       7/10/2023     6:46 PM 2/8/2024     1:26 PM 3/8/2024     3:44 PM   SABINA-7 SCORE   Total Score 15 (severe anxiety) 11 (moderate anxiety) 14 (moderate anxiety)   Total Score 15 11 14     {If no scores are seen above, please complete the assessments and right click on scores to refresh Link to Opioid Use Disorder Criteria  Link to PHQ assessment  Link to GAD7 assessment  If patient is having withdrawal symptoms, complete the COWS assessment use .cows to pull in score  :779903}    PDMP Review         Value Time User    State PDMP site checked  Yes 4/7/2025  9:46 AM Samra Longo DO            {Provider  Link to [employer] Activity - Search OUD :916812}  {additonal problems for provider to add (Optional):112686}    {ROS Picklists (Optional):803750}      Objective    /82 (BP Location: Right arm, Patient Position: Chair, Cuff Size: Adult Regular)   Pulse 63   Temp 98.2  F (36.8  C)   Resp 16   Ht 1.753 m (5' 9\")   Wt 86.2 kg (190 lb)   SpO2 100%   BMI 28.06 kg/m    Body mass index is 28.06 kg/m .  Physical Exam   {Exam List (Optional):011953}    {Diagnostic Test Results (Optional):245515}        Signed Electronically by: Samra Longo DO  {Email feedback regarding this note to " [email or web address]   :[id number]}

## 2025-04-14 LAB
BUPRENORPHINE UR CFM-MCNC: 115 NG/ML
BUPRENORPHINE/CREAT UR: 125 NG/MG {CREAT}
GABAPENTIN UR QL CFM: PRESENT
NALOXONE UR CFM-MCNC: 249 NG/ML
NALOXONE: 271 NG/MG {CREAT}
NORBUPRENORPHINE UR CFM-MCNC: 196 NG/ML
NORBUPRENORPHINE/CREAT UR: 213 NG/MG {CREAT}

## 2025-04-14 NOTE — PROGRESS NOTES
1. 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  Patient's mother showed fundus photos from 2019 that demonstrate similar appearance as today's exam.  There is a subtle difference in the appearance of the optic nerve head between the photos (2019 and most recent visit) however I believe this is attributable to a singh difference in the exposure of the photographs-the most recent visit showed an overexposed photograph.  No evidence of papilledema or true optic disc edema in either eye today by exam.  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     OCT is only thickened in the region of the abnormal myelinated RNFL and is otherwise within normal limits in both eyes.  No evidence of visual dysfunction or optic nerve dysfunction including full automated perimetry in the right eye today.     Reassured patient and parents that I see no indication for serious disease/pathology    2.  Headache - etiology of the patient's headaches is unclear but I believe this is unrelated to his anomalous appearing right optic nerve head.  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  He may have a mild primary headache disorder with possible contribution by concussion.  No concern for elevated ICP as etiology of headaches.     Patient can follow-up with neuro-ophthalmology as needed.  If he continues to have prominent headaches I would recommend referral to either a primary care provider knowledgeable in headaches or a neurologist with headache expertise.    Letter to Eliana Gonzales, OD.  Recommend patient continue to follow-up with Dr. Gonzales and colleagues in the future for routine eye care.    Servando Mao is a pleasant 17 year old Choose not to Answer male who presents to my neuro-ophthalmology clinic today for evaluation of abnormal optic nerve head appearance.     HPI:  Vision in the right eye  "  Assessment & Plan     Moderate opioid use disorder, in early remission (H)  Taking 4mg-4mg, if needed can take additional 2 mg midday. Follow up in 1 week. Congratulated on sobriety  - Drug Confirmation Panel Urine with Creat - lab collect  - buprenorphine-naloxone (SUBOXONE) 2-0.5 MG SUBL sublingual tablet  Dispense: 28 tablet; Refill: 0  - Drug Confirmation Panel Urine with Creat - lab collect            BMI  Estimated body mass index is 28.06 kg/m  as calculated from the following:    Height as of this encounter: 1.753 m (5' 9\").    Weight as of this encounter: 86.2 kg (190 lb).         Follow up 1 week  The longitudinal plan of care for the diagnosis(es)/condition(s) as documented were addressed during this visit. Due to the added complexity in care, I will continue to support Bk in the subsequent management and with ongoing continuity of care.    Mireya Bourgeois is a 29 year old, presenting for the following health issues:  Drug Problem        4/10/2025     6:00 PM   Additional Questions   Roomed by Kalyn SHEA MA   Accompanied by Self     HPI          Current Narcotic Use/History:  Which opioid(s) are you currently using, that are not already on your med list?: No  How do you use your drug of choice? KRatom  What is your estimated total dose (mg if pills, grams of heroin) per day? unsure  When did you last use? 3 days ago  Have you ever tried to quit on your own? yes  What have you done to try quiting in the past? Cold turkey  What was the longest period of time you have been sober from opioids?:   When and how did you start using opioids? Over 5 years ago    Other Substance/Psychiatric History:  Have you been struggling with any other mental health symptoms?: no  Any other drug use other than opioids?: None  Do you struggle with any other addictive behaviors (sex, gambling, internet, shopping, TV etc): no  Are you sexually active?: yes, single partner, contraception - none       Family History:  Does " "anyone in your family have a history of a use disorder? Alcohol use    Opioid Use Disorder Criteria:        No data to display              PHQ Score:      12/21/2020     2:31 PM 2/8/2024     1:24 PM 3/8/2024     3:43 PM   PHQ   PHQ-9 Total Score 9 10 5   Q9: Thoughts of better off dead/self-harm past 2 weeks Not at all Not at all Not at all     GAD7 Score:       7/10/2023     6:46 PM 2/8/2024     1:26 PM 3/8/2024     3:44 PM   SABINA-7 SCORE   Total Score 15 (severe anxiety) 11 (moderate anxiety) 14 (moderate anxiety)   Total Score 15 11 14         PDMP Review         Value Time User    State PDMP site checked  Yes 4/10/2025  3:10 PM Samra Longo DO              Review of Systems  Constitutional, HEENT, cardiovascular, pulmonary, gi and gu systems are negative, except as otherwise noted.      Objective    /82 (BP Location: Right arm, Patient Position: Chair, Cuff Size: Adult Regular)   Pulse 63   Temp 98.2  F (36.8  C)   Resp 16   Ht 1.753 m (5' 9\")   Wt 86.2 kg (190 lb)   SpO2 100%   BMI 28.06 kg/m    Body mass index is 28.06 kg/m .  Physical Exam   GENERAL: alert and no distress  EYES: Eyes grossly normal to inspection, PERRL and conjunctivae and sclerae normal  HENT: ear canals and TM's normal, nose and mouth without ulcers or lesions  NECK: no adenopathy, no asymmetry, masses, or scars  RESP: lungs clear to auscultation - no rales, rhonchi or wheezes  CV: regular rate and rhythm, normal S1 S2, no S3 or S4, no murmur, click or rub, no peripheral edema  ABDOMEN: soft, nontender, no hepatosplenomegaly, no masses and bowel sounds normal  MS: no gross musculoskeletal defects noted, no edema  SKIN: no suspicious lesions or rashes  NEURO: Normal strength and tone, mentation intact and speech normal  PSYCH: mentation appears normal, affect normal/bright            Signed Electronically by: Samra Longo DO    " has worsened over about the last year. Possible mild discomfort with eye movements. He reports occasional headaches in the right temple that is usually present when he wakes up. The only thing that makes the headaches go away is time. 4-5 of these, happens almost every morning. Saw new optometrist (old optom retired) who noted indisctinct optic nerve head margins and possible myelinated RNFL. Last exam prior to that was 2019. They had never been informed of abnormality in optic nerve previously. Of note, patient was refracted to 20/20 at that time.     Denies double vision, pulsatile tinnitus, TVOs. ROS negative for use of oral antibiotics for acne of the tetracycline class, retinoid acne creams, oral vitamin A supplements. No history of smoking or personal or family history of blood clots.     Independent historians: Patient, mother, father.    Review of outside testing: outside notes reviewed, testing not clear 2/2 quality.      My interpretation performed today of outside testing: see above      Review of outside clinical notes:  Janet COFFEY           Past medical history: healthy     Family history / social history: migraines in mom. No ARMD/glaucoma, no autoimmune disease.    Exam:  Visual acuity without correction was 20/30 in the right eye and 20/20 in the left eye.  Pupil function was normal with both pupils being briskly reactive to light and no APD.  IOP was 17 in the right eye and 19 in the left eye. Visual fields were full in both eyes. Ocular motility was full in all gaze directions. Color plates were 11/11 in the right eye and 11/11 in the left eye.      Strabismus exam: Full motility in both eyes.  No nystagmus.    Anterior segment exam: Unremarkable bilaterally  fundus exam: myelinated RNFL right eye superiorly.  There was a possible small region of myelinated nerve fiber layer inferiorly in the left eye although this was not certain.  There were spontaneous venous pulsations clearly visible at the  left optic nerve head.    Tests ordered and interpreted today:  OCT rNFL demonstrated a mean NFL thickness of 117 in the right eye and 105 in the left eye. Thickness profile demonstrated thickening in the right eye focally superiorly in the region of the myelinated nerve fiber layer and normal thickness in all quadrants in the left eye.    VF: Glaucoma TOP visual field was performed. Test was reliable.. Right eye was normal. Left eye  was normal.       Benjamin Euceda,    PGY-5 Neuro-Ophthalmology Fellow    35 minutes were spent on the date of the encounter by me doing chart review, history and exam, documentation, and further activities as noted above    Complete documentation of historical and exam elements from today's encounter can be found in the full encounter summary report (not reduplicated in this progress note).  I personally obtained the chief complaint(s) and history of present illness.  I confirmed and edited as necessary the review of systems, past medical/surgical history, family history, social history, and examination findings as documented by others; and I examined the patient myself.  I personally reviewed the relevant tests, images, and reports as documented above.  I formulated and edited as necessary the assessment and plan and discussed the findings and management plan with the patient and family     Yosi Modi MD

## 2025-04-16 ENCOUNTER — PATIENT OUTREACH (OUTPATIENT)
Dept: CARE COORDINATION | Facility: CLINIC | Age: 30
End: 2025-04-16
Payer: COMMERCIAL

## 2025-04-19 ENCOUNTER — HEALTH MAINTENANCE LETTER (OUTPATIENT)
Age: 30
End: 2025-04-19

## 2025-05-19 ENCOUNTER — MYC REFILL (OUTPATIENT)
Dept: FAMILY MEDICINE | Facility: CLINIC | Age: 30
End: 2025-05-19
Payer: COMMERCIAL

## 2025-05-19 DIAGNOSIS — F11.21 MODERATE OPIOID USE DISORDER, IN EARLY REMISSION (H): ICD-10-CM

## 2025-05-19 RX ORDER — BUPRENORPHINE HYDROCHLORIDE AND NALOXONE HYDROCHLORIDE DIHYDRATE 2; .5 MG/1; MG/1
2 TABLET SUBLINGUAL 2 TIMES DAILY
Qty: 120 TABLET | Refills: 0 | Status: SHIPPED | OUTPATIENT
Start: 2025-05-19

## 2025-05-19 NOTE — TELEPHONE ENCOUNTER
PDMP Review         Value Time User    State PDMP site checked  Yes 5/19/2025 11:09 AM Jenifer Rust MD Amy C. Anderson, M.D.

## 2025-06-02 ENCOUNTER — VIRTUAL VISIT (OUTPATIENT)
Dept: FAMILY MEDICINE | Facility: CLINIC | Age: 30
End: 2025-06-02
Payer: COMMERCIAL

## 2025-06-02 DIAGNOSIS — F11.21 MODERATE OPIOID USE DISORDER, IN EARLY REMISSION (H): Primary | ICD-10-CM

## 2025-06-02 PROCEDURE — 98005 SYNCH AUDIO-VIDEO EST LOW 20: CPT | Performed by: FAMILY MEDICINE

## 2025-06-02 NOTE — PROGRESS NOTES
Bk is a 30 year old who is being evaluated via a billable video visit.    How would you like to obtain your AVS? MyChart  If the video visit is dropped, the invitation should be resent by: Text to cell phone: [phone]  Will anyone else be joining your video visit? No    Assessment & Plan     Moderate opioid use disorder, in early remission (H)  Doing well, one day where he ad to take midday dose. Encouraged to use tylenol as replacement as work gets busier, if he feels additional cravings can add in midday dose as needed.   - Drug Confirmation Panel Urine with Creat - lab collect  - buprenorphine-naloxone (SUBOXONE) 2-0.5 MG SUBL sublingual tablet  Dispense: 120 tablet; Refill: 0      The longitudinal plan of care for the diagnosis(es)/condition(s) as documented were addressed during this visit. Due to the added complexity in care, I will continue to support Bk in the subsequent management and with ongoing continuity of care.      Subjective   Bk is a 30 year old, presenting for the following health issues:  Opioid Refill        4/18/2025     2:26 PM   Additional Questions   Roomed by Kalyn SHEA MA   Accompanied by Self     HPI        He is currently taking 8 mg of buprenorphine daily. This is taken in 2 dose(s) daily.  Did take an extra tablet for 4 days    Status Since Last Visit:  Have you used any opioids since your last visit?: no use since last visit  Do you feel that your dose of suboxone is too high or too low? Adequate  Have there been cravings for opioids? No   Any withdrawal symptoms? none    Any side effects from the medication? no  Any alcohol use? No  Any other recreational drug use? No    Precipitating Factors:  Triggers have been: non-existent   Other Supports:  Do you attend counseling or meet with a therapist? No  Do you attend NA or AA meetings? No  Do you have/meet with a sponsor? No  Family and support systems have been:   What other goals have you been working on (job, family,  relationships, etc)? Just started new job    PDMP Review         Value Time User    State PDMP site checked  Yes 6/2/2025 11:34 AM Samra Longo DO              Review of Systems  Constitutional, HEENT, cardiovascular, pulmonary, gi and gu systems are negative, except as otherwise noted.      Objective           Vitals:  No vitals were obtained today due to virtual visit.    Physical Exam   GENERAL: alert and no distress  EYES: Eyes grossly normal to inspection.  No discharge or erythema, or obvious scleral/conjunctival abnormalities.  RESP: No audible wheeze, cough, or visible cyanosis.    SKIN: Visible skin clear. No significant rash, abnormal pigmentation or lesions.  NEURO: Cranial nerves grossly intact.  Mentation and speech appropriate for age.  PSYCH: Appropriate affect, tone, and pace of words        Video-Visit Details    Type of service:  Video Visit   Originating Location (pt. Location): Home  Distant Location (provider location):  Off-site  Platform used for Video Visit: Velma  Signed Electronically by: Samra Longo DO

## 2025-06-03 ENCOUNTER — LAB (OUTPATIENT)
Dept: LAB | Facility: CLINIC | Age: 30
End: 2025-06-03
Payer: COMMERCIAL

## 2025-06-03 DIAGNOSIS — F11.21 MODERATE OPIOID USE DISORDER, IN EARLY REMISSION (H): ICD-10-CM

## 2025-06-03 LAB — CREAT UR-MCNC: 120 MG/DL

## 2025-06-05 ENCOUNTER — RESULTS FOLLOW-UP (OUTPATIENT)
Dept: FAMILY MEDICINE | Facility: CLINIC | Age: 30
End: 2025-06-05

## 2025-06-05 LAB
BUPRENORPHINE UR CFM-MCNC: 100 NG/ML
BUPRENORPHINE/CREAT UR: 83 NG/MG {CREAT}
NALOXONE UR CFM-MCNC: 823 NG/ML
NALOXONE: 686 NG/MG {CREAT}
NORBUPRENORPHINE UR CFM-MCNC: 432 NG/ML
NORBUPRENORPHINE/CREAT UR: 360 NG/MG {CREAT}

## 2025-06-05 RX ORDER — BUPRENORPHINE HYDROCHLORIDE AND NALOXONE HYDROCHLORIDE DIHYDRATE 2; .5 MG/1; MG/1
2 TABLET SUBLINGUAL 2 TIMES DAILY
Qty: 120 TABLET | Refills: 0 | Status: SHIPPED | OUTPATIENT
Start: 2025-06-05

## 2025-07-02 ENCOUNTER — VIRTUAL VISIT (OUTPATIENT)
Dept: FAMILY MEDICINE | Facility: CLINIC | Age: 30
End: 2025-07-02
Payer: COMMERCIAL

## 2025-07-02 DIAGNOSIS — M54.42 CHRONIC BILATERAL LOW BACK PAIN WITH LEFT-SIDED SCIATICA: ICD-10-CM

## 2025-07-02 DIAGNOSIS — G89.29 CHRONIC BILATERAL LOW BACK PAIN WITH LEFT-SIDED SCIATICA: ICD-10-CM

## 2025-07-02 DIAGNOSIS — F11.21 MODERATE OPIOID USE DISORDER, IN EARLY REMISSION (H): Primary | ICD-10-CM

## 2025-07-02 NOTE — PROGRESS NOTES
Bk is a 30 year old who is being evaluated via a billable video visit.    How would you like to obtain your AVS? MyChart  If the video visit is dropped, the invitation should be resent by: Text to cell phone: [phone]  Will anyone else be joining your video visit? No      Assessment & Plan     Moderate opioid use disorder, in early remission (H)  Doing well on current dose, refills provided  - Drug Confirmation Panel Urine with Creat - lab collect    Chronic bilateral low back pain with left-sided sciatica  Pain for over 3 months.  Did see chiropractor with no improvement.  X-rays was completed at chiropractor that was stable.  With radicular symptoms recommend pursuing MRI.  Would like imaging faxed to [facility]    The longitudinal plan of care for the diagnosis(es)/condition(s) as documented were addressed during this visit. Due to the added complexity in care, I will continue to support Bk in the subsequent management and with ongoing continuity of care.    Subjective   Bk is a 30 year old, presenting for the following health issues:  Addiction Problem        7/2/2025     5:30 PM   Additional Questions   Roomed by Kalyn SHEA MA   Accompanied by Self     HPI        He is currently taking 8 mg of buprenorphine daily. This is taken in 2 dose(s) daily.     Status Since Last Visit:  Have you used any opioids since your last visit?: no use since last visit  Do you feel that your dose of suboxone is too high or too low? Adequate  Have there been cravings for opioids? No   Any withdrawal symptoms? none    Any side effects from the medication? none  Any alcohol use? No  Any other recreational drug use? No    Precipitating Factors:  Triggers have been: non-existent   Other Supports:  Do you attend counseling or meet with a therapist? No  Do you attend NA or AA meetings? No  Do you have/meet with a sponsor? No  Family and support systems have been:   What other goals have you been working on (job, family, relationships,  etc)?       PDMP Review         Value Time User    State PDMP site checked  Yes 6/2/2025 11:34 AM Samra Longo DO          Low back pain-    Review of Systems  Constitutional, HEENT, cardiovascular, pulmonary, gi and gu systems are negative, except as otherwise noted.      Objective           Vitals:  No vitals were obtained today due to virtual visit.    Physical Exam   GENERAL: alert and no distress  EYES: Eyes grossly normal to inspection.  No discharge or erythema, or obvious scleral/conjunctival abnormalities.  RESP: No audible wheeze, cough, or visible cyanosis.    SKIN: Visible skin clear. No significant rash, abnormal pigmentation or lesions.  NEURO: Cranial nerves grossly intact.  Mentation and speech appropriate for age.  PSYCH: Appropriate affect, tone, and pace of words        Video-Visit Details    Type of service:  Video Visit   Originating Location (pt. Location): Home    Distant Location (provider location):  On-site  Platform used for Video Visit: Concepcion  Signed Electronically by: Samra Longo DO

## 2025-07-03 ENCOUNTER — LAB (OUTPATIENT)
Dept: LAB | Facility: CLINIC | Age: 30
End: 2025-07-03
Payer: COMMERCIAL

## 2025-07-03 ENCOUNTER — MYC MEDICAL ADVICE (OUTPATIENT)
Dept: FAMILY MEDICINE | Facility: CLINIC | Age: 30
End: 2025-07-03

## 2025-07-03 ENCOUNTER — DOCUMENTATION ONLY (OUTPATIENT)
Dept: FAMILY MEDICINE | Facility: CLINIC | Age: 30
End: 2025-07-03

## 2025-07-03 DIAGNOSIS — F11.21 MODERATE OPIOID USE DISORDER, IN EARLY REMISSION (H): ICD-10-CM

## 2025-07-03 LAB — CREAT UR-MCNC: 265 MG/DL

## 2025-07-03 RX ORDER — BUPRENORPHINE HYDROCHLORIDE AND NALOXONE HYDROCHLORIDE DIHYDRATE 2; .5 MG/1; MG/1
2 TABLET SUBLINGUAL 2 TIMES DAILY
Qty: 120 TABLET | Refills: 0 | Status: SHIPPED | OUTPATIENT
Start: 2025-07-03

## 2025-07-07 ENCOUNTER — HOSPITAL ENCOUNTER (OUTPATIENT)
Dept: ULTRASOUND IMAGING | Facility: CLINIC | Age: 30
Discharge: HOME | End: 2025-07-07
Attending: NURSE PRACTITIONER | Admitting: NURSE PRACTITIONER
Payer: COMMERCIAL

## 2025-07-07 ENCOUNTER — OFFICE VISIT (OUTPATIENT)
Dept: FAMILY MEDICINE | Facility: CLINIC | Age: 30
End: 2025-07-07
Payer: COMMERCIAL

## 2025-07-07 ENCOUNTER — OFFICE VISIT (OUTPATIENT)
Dept: PEDIATRICS | Facility: CLINIC | Age: 30
End: 2025-07-07
Payer: COMMERCIAL

## 2025-07-07 VITALS
WEIGHT: 188 LBS | SYSTOLIC BLOOD PRESSURE: 122 MMHG | RESPIRATION RATE: 16 BRPM | HEART RATE: 63 BPM | OXYGEN SATURATION: 100 % | HEIGHT: 69 IN | DIASTOLIC BLOOD PRESSURE: 84 MMHG | TEMPERATURE: 97.7 F | BODY MASS INDEX: 27.85 KG/M2

## 2025-07-07 VITALS
TEMPERATURE: 97.7 F | WEIGHT: 188 LBS | SYSTOLIC BLOOD PRESSURE: 122 MMHG | OXYGEN SATURATION: 100 % | BODY MASS INDEX: 27.85 KG/M2 | HEIGHT: 69 IN | HEART RATE: 63 BPM | RESPIRATION RATE: 20 BRPM | DIASTOLIC BLOOD PRESSURE: 84 MMHG

## 2025-07-07 DIAGNOSIS — R10.13 ABDOMINAL PAIN, EPIGASTRIC: Primary | ICD-10-CM

## 2025-07-07 DIAGNOSIS — R10.13 EPIGASTRIC PAIN: ICD-10-CM

## 2025-07-07 DIAGNOSIS — R10.13 EPIGASTRIC PAIN: Primary | ICD-10-CM

## 2025-07-07 LAB
ALBUMIN SERPL BCG-MCNC: 4.3 G/DL (ref 3.5–5.2)
ALP SERPL-CCNC: 79 U/L (ref 40–150)
ALT SERPL W P-5'-P-CCNC: 23 U/L (ref 0–70)
ANION GAP SERPL CALCULATED.3IONS-SCNC: 10 MMOL/L (ref 7–15)
AST SERPL W P-5'-P-CCNC: 28 U/L (ref 0–45)
BASOPHILS # BLD AUTO: 0 10E3/UL (ref 0–0.2)
BASOPHILS NFR BLD AUTO: 0 %
BILIRUB SERPL-MCNC: 0.3 MG/DL
BUN SERPL-MCNC: 11.6 MG/DL (ref 6–20)
CALCIUM SERPL-MCNC: 9.3 MG/DL (ref 8.8–10.4)
CHLORIDE SERPL-SCNC: 104 MMOL/L (ref 98–107)
CREAT SERPL-MCNC: 0.79 MG/DL (ref 0.67–1.17)
EGFRCR SERPLBLD CKD-EPI 2021: >90 ML/MIN/1.73M2
EOSINOPHIL # BLD AUTO: 0.1 10E3/UL (ref 0–0.7)
EOSINOPHIL NFR BLD AUTO: 2 %
ERYTHROCYTE [DISTWIDTH] IN BLOOD BY AUTOMATED COUNT: 12 % (ref 10–15)
GLUCOSE SERPL-MCNC: 108 MG/DL (ref 70–99)
HCO3 SERPL-SCNC: 25 MMOL/L (ref 22–29)
HCT VFR BLD AUTO: 38.9 % (ref 40–53)
HGB BLD-MCNC: 13.5 G/DL (ref 13.3–17.7)
IMM GRANULOCYTES # BLD: 0 10E3/UL
IMM GRANULOCYTES NFR BLD: 0 %
LIPASE SERPL-CCNC: 22 U/L (ref 13–60)
LYMPHOCYTES # BLD AUTO: 2.1 10E3/UL (ref 0.8–5.3)
LYMPHOCYTES NFR BLD AUTO: 42 %
MCH RBC QN AUTO: 29.3 PG (ref 26.5–33)
MCHC RBC AUTO-ENTMCNC: 34.7 G/DL (ref 31.5–36.5)
MCV RBC AUTO: 85 FL (ref 78–100)
MONOCYTES # BLD AUTO: 0.4 10E3/UL (ref 0–1.3)
MONOCYTES NFR BLD AUTO: 8 %
NEUTROPHILS # BLD AUTO: 2.3 10E3/UL (ref 1.6–8.3)
NEUTROPHILS NFR BLD AUTO: 47 %
PLATELET # BLD AUTO: 288 10E3/UL (ref 150–450)
POTASSIUM SERPL-SCNC: 3.6 MMOL/L (ref 3.4–5.3)
PROT SERPL-MCNC: 7.2 G/DL (ref 6.4–8.3)
RBC # BLD AUTO: 4.6 10E6/UL (ref 4.4–5.9)
SODIUM SERPL-SCNC: 139 MMOL/L (ref 135–145)
WBC # BLD AUTO: 4.9 10E3/UL (ref 4–11)

## 2025-07-07 PROCEDURE — 85025 COMPLETE CBC W/AUTO DIFF WBC: CPT | Performed by: NURSE PRACTITIONER

## 2025-07-07 PROCEDURE — 3074F SYST BP LT 130 MM HG: CPT | Performed by: FAMILY MEDICINE

## 2025-07-07 PROCEDURE — 80053 COMPREHEN METABOLIC PANEL: CPT | Performed by: NURSE PRACTITIONER

## 2025-07-07 PROCEDURE — 99207 REFERRAL TO ACUTE AND DIAGNOSTIC SERVICES: CPT | Performed by: FAMILY MEDICINE

## 2025-07-07 PROCEDURE — 83690 ASSAY OF LIPASE: CPT | Performed by: NURSE PRACTITIONER

## 2025-07-07 PROCEDURE — 99214 OFFICE O/P EST MOD 30 MIN: CPT | Performed by: NURSE PRACTITIONER

## 2025-07-07 PROCEDURE — 1125F AMNT PAIN NOTED PAIN PRSNT: CPT | Performed by: FAMILY MEDICINE

## 2025-07-07 PROCEDURE — 36415 COLL VENOUS BLD VENIPUNCTURE: CPT | Performed by: NURSE PRACTITIONER

## 2025-07-07 PROCEDURE — 3074F SYST BP LT 130 MM HG: CPT | Performed by: NURSE PRACTITIONER

## 2025-07-07 PROCEDURE — 76705 ECHO EXAM OF ABDOMEN: CPT

## 2025-07-07 PROCEDURE — 3079F DIAST BP 80-89 MM HG: CPT | Performed by: FAMILY MEDICINE

## 2025-07-07 PROCEDURE — 3079F DIAST BP 80-89 MM HG: CPT | Performed by: NURSE PRACTITIONER

## 2025-07-07 PROCEDURE — 1125F AMNT PAIN NOTED PAIN PRSNT: CPT | Performed by: NURSE PRACTITIONER

## 2025-07-07 RX ORDER — OMEPRAZOLE 20 MG/1
20 CAPSULE, DELAYED RELEASE ORAL 2 TIMES DAILY
Qty: 28 CAPSULE | Refills: 0 | Status: SHIPPED | OUTPATIENT
Start: 2025-07-07 | End: 2025-07-21

## 2025-07-07 ASSESSMENT — PAIN SCALES - GENERAL
PAINLEVEL_OUTOF10: MODERATE PAIN (6)
PAINLEVEL_OUTOF10: MODERATE PAIN (5)

## 2025-07-07 ASSESSMENT — ENCOUNTER SYMPTOMS: ABDOMINAL PAIN: 1

## 2025-07-07 NOTE — PATIENT INSTRUCTIONS
Labs and ultrasound look ok today.  I am going to start you on 2 weeks of an acid reducing medication, and have you follow up with your PCP.  If you have worsening symptoms, severe pain, go to ER right away.

## 2025-07-07 NOTE — PROGRESS NOTES
[facility] Visit    Assessment & Plan     Epigastric pain  Ongoing for a week, severe at times, see HPI for further details. Ddx includes but is not limited to cholecystitis, cholelithiasis, gastritis, pancreatitis, others. CBC is without leukocytosis and no fevers, less concern for infection. No obstructive pattern of LFTs, no gallstone seen on US, biliary colic less likely. Normal lipase, and although pancreas not well visualized on US, lowers concern for pancreatitis. Could certainly be gastritis, GERD? We will start him on 2 weeks of omeprazole to see if symptoms improve and have him follow up with PCP in 1-2 weeks. Advised if severe worsening of pain, should be seen in ER.   - CBC with platelets differential; Future  - Comprehensive metabolic panel; Future  - Lipase; Future  - US Abdomen Limited; Future  - omeprazole (PRILOSEC) 20 MG DR capsule; Take 1 capsule (20 mg) by mouth 2 times daily for 14 days.        Follow-up  Return in about 1 week (around 7/14/2025) for Recheck of symptoms.    Mireya Bourgeois is a 30 year old, presenting for the following health issues:  Abdominal Pain    HPI      Abdominal/Flank Pain  Onset/Duration: a week  Description:   Character: Sharp, Dull ache, and Stabbing  Location: epigastric region left flank  Radiation: Back  Intensity: moderate, severe  Progression of Symptoms:  worsening  Accompanying Signs & Symptoms:  Fever/chills: no   Gas/Bloating: YES- bloated  Nausea: no   Vomitting: no   Diarrhea: no   Constipation:no   Dysuria: no            Hematuria: no            Frequency: no            Incontinence of urine: no   History:            Last bowel movement: yesterday  Trauma: no   Previous similar pain: no    Previous tests done: none           Previous Abdominal surgery: no   Precipitating factors:   Does the pain change with:     Food: YES     Bowel Movement: no     Urination: no              Other factors: no   Therapies tried and outcome:   "ibuprofen - worsens pain    When food last eaten: last night    Has had about 1 week of intermittent epigastric pain, significantly worsening last night after eating steak hibachi.  Pain occasionally radiates to back. Has not had any nausea or vomiting. No fevers.  Denies any significant alcohol use.  No constipation or diarrhea. No chest pain, reflux type symptoms. No previous history of similar symptoms or previous abd surgeries.      Review of Systems  Constitutional, HEENT, cardiovascular, pulmonary, GI, , musculoskeletal, neuro, skin, endocrine and psych systems are negative, except as otherwise noted.      Objective    /84   Pulse 63   Temp 97.7  F (36.5  C) (Tympanic)   Resp 20   Ht 1.74 m (5' 8.5\")   Wt 85.3 kg (188 lb)   SpO2 100%   BMI 28.17 kg/m    Body mass index is 28.17 kg/m .  Physical Exam  Vitals and nursing note reviewed.   Constitutional:       Appearance: Normal appearance.   HENT:      Head: Normocephalic and atraumatic.      Mouth/Throat:      Mouth: Mucous membranes are moist.   Cardiovascular:      Rate and Rhythm: Normal rate.   Pulmonary:      Effort: Pulmonary effort is normal.   Abdominal:      Tenderness: There is abdominal tenderness (epigastric).   Musculoskeletal:      Cervical back: Neck supple.   Skin:     General: Skin is warm and dry.   Neurological:      General: No focal deficit present.      Mental Status: He is alert.   Psychiatric:         Mood and Affect: Mood normal.         Behavior: Behavior normal.            Recent Results (from the past 24 hours)   CBC with platelets differential    Narrative    The following orders were created for panel order CBC with platelets differential.  Procedure                               Abnormality         Status                     ---------                               -----------         ------                     CBC with platelets and ...[[id number]]  Abnormal            Final result                 Please view " results for these tests on the individual orders.   Comprehensive metabolic panel   Result Value Ref Range    Sodium 139 135 - 145 mmol/L    Potassium 3.6 3.4 - 5.3 mmol/L    Carbon Dioxide (CO2) 25 22 - 29 mmol/L    Anion Gap 10 7 - 15 mmol/L    Urea Nitrogen 11.6 6.0 - 20.0 mg/dL    Creatinine 0.79 0.67 - 1.17 mg/dL    GFR Estimate >90 >60 mL/min/1.73m2    Calcium 9.3 8.8 - 10.4 mg/dL    Chloride 104 98 - 107 mmol/L    Glucose 108 (H) 70 - 99 mg/dL    Alkaline Phosphatase 79 40 - 150 U/L    AST 28 0 - 45 U/L    ALT 23 0 - 70 U/L    Protein Total 7.2 6.4 - 8.3 g/dL    Albumin 4.3 3.5 - 5.2 g/dL    Bilirubin Total 0.3 <=1.2 mg/dL   Lipase   Result Value Ref Range    Lipase 22 13 - 60 U/L   CBC with platelets and differential   Result Value Ref Range    WBC Count 4.9 4.0 - 11.0 10e3/uL    RBC Count 4.60 4.40 - 5.90 10e6/uL    Hemoglobin 13.5 13.3 - 17.7 g/dL    Hematocrit 38.9 (L) 40.0 - 53.0 %    MCV 85 78 - 100 fL    MCH 29.3 26.5 - 33.0 pg    MCHC 34.7 31.5 - 36.5 g/dL    RDW 12.0 10.0 - 15.0 %    Platelet Count 288 150 - 450 10e3/uL    % Neutrophils 47 %    % Lymphocytes 42 %    % Monocytes 8 %    % Eosinophils 2 %    % Basophils 0 %    % Immature Granulocytes 0 %    Absolute Neutrophils 2.3 1.6 - 8.3 10e3/uL    Absolute Lymphocytes 2.1 0.8 - 5.3 10e3/uL    Absolute Monocytes 0.4 0.0 - 1.3 10e3/uL    Absolute Eosinophils 0.1 0.0 - 0.7 10e3/uL    Absolute Basophils 0.0 0.0 - 0.2 10e3/uL    Absolute Immature Granulocytes 0.0 <=0.4 10e3/uL   US Abdomen Limited    Narrative    EXAM: US ABDOMEN LIMITED  LOCATION: [facility]  DATE: 7/7/2025    INDICATION: severe epigastric pain x 1 week, worsening after meals. concern for cholelithiais as well as pancreatitis  COMPARISON: None.  TECHNIQUE: Limited abdominal ultrasound.    FINDINGS: Technically challenging exam due to bowel gas    GALLBLADDER: Normal. No gallstones, wall thickening, or pericholecystic fluid. Negative sonographic Enriquez's  sign.    BILE DUCTS: No biliary dilatation. The common duct measures 4 mm.    LIVER: Mildly increased echogenicity from diffuse hepatic steatosis. No focal mass. The portal vein is patent with flow in the normal direction.    RIGHT KIDNEY: No hydronephrosis.    PANCREAS: The pancreas is largely obscured by overlying gas.    No ascites.      Impression    IMPRESSION:  1.  No gallstones or biliary dilation.  2.  Mild hepatic steatosis.         No results found for this visit on 07/07/25.  US Abdomen Limited  Result Date: 7/7/2025  EXAM: US ABDOMEN LIMITED LOCATION: [facility] DATE: 7/7/2025 INDICATION: severe epigastric pain x 1 week, worsening after meals. concern for cholelithiais as well as pancreatitis COMPARISON: None. TECHNIQUE: Limited abdominal ultrasound. FINDINGS: Technically challenging exam due to bowel gas GALLBLADDER: Normal. No gallstones, wall thickening, or pericholecystic fluid. Negative sonographic Enriquez's sign. BILE DUCTS: No biliary dilatation. The common duct measures 4 mm. LIVER: Mildly increased echogenicity from diffuse hepatic steatosis. No focal mass. The portal vein is patent with flow in the normal direction. RIGHT KIDNEY: No hydronephrosis. PANCREAS: The pancreas is largely obscured by overlying gas. No ascites.     IMPRESSION: 1.  No gallstones or biliary dilation. 2.  Mild hepatic steatosis.         Signed Electronically by: NAS Nguyễn CNP

## 2025-07-07 NOTE — PROGRESS NOTES
Assessment & Plan     Abdominal pain, epigastric  Discussed options   Suspect GB VS gastritis VS pancreatitis     Will referr to [facility] for same day diagnosis     Referral to Acute and Diagnostic Services    [phone] ([place]) [address] [address]    Transition to [facility] Clinic has been discussed with patient, and he agrees with next level of care.   Patient understands that evaluation/treatment at [facility] typically takes significantly longer than in clinic/urgent care (>2 hours).  The [facility] has been contacted by provider/staff to confirm patient acceptance.         Special issues:      None                     The following provider has assessed this patient for intervention at [facility], and directed the patient for referral: Deanna Akhtar MD                            Mireya Bourgeois is a 30 year old, presenting for the following health issues:  Abdominal Pain      7/7/2025     8:47 AM   Additional Questions   Roomed by Sari     Abdominal Pain     History of Present Illness       Reason for visit:  Stomach pain  Symptom onset:  3-7 days ago  Symptoms include:  Sharp stomach pain back pain  Symptom intensity:  Moderate  Symptom progression:  Staying the same  Had these symptoms before:  No  What makes it worse:  Eating somtimes  What makes it better:  No   He is taking medications regularly.          7 days of not feeling well   Sharp pain epigstric into the back   Eating big meal brought this on last night     2 red bulls daily   Vapes daily     No ETOH     No emesis   No fever   Normal bowel movements     Ate small meals all weekend and was better until   Large hibachi meal last night   Pain sharp and severe for hours almost went to ER     Has not tried to take anything other than ibuprofen when this is bothering him         Review of Systems  Constitutional, HEENT, cardiovascular, pulmonary, gi and gu  "systems are negative, except as otherwise noted.      Objective    /84   Pulse 63   Temp 97.7  F (36.5  C) (Tympanic)   Resp 16   Ht 1.74 m (5' 8.5\")   Wt 85.3 kg (188 lb)   SpO2 100%   BMI 28.17 kg/m    Body mass index is 28.17 kg/m .  Physical Exam   GENERAL: alert and no distress  RESP: lungs clear to auscultation - no rales, rhonchi or wheezes  CV: regular rate and rhythm, normal S1 S2, no S3 or S4, no murmur, click or rub, no peripheral edema   ABDOMEN: tenderness epigastric, no organomegaly or masses, and bowel sounds normal  MS: no gross musculoskeletal defects noted, no edema  PSYCH: mentation appears normal, affect normal/bright            Signed Electronically by: Deanna Akhtar MD    "

## 2025-07-10 LAB
BUPRENORPHINE UR CFM-MCNC: 337 NG/ML
BUPRENORPHINE/CREAT UR: 127 NG/MG {CREAT}
NALOXONE UR CFM-MCNC: 2300 NG/ML
NALOXONE: 868 NG/MG {CREAT}
NORBUPRENORPHINE UR CFM-MCNC: 1030 NG/ML
NORBUPRENORPHINE/CREAT UR: 389 NG/MG {CREAT}

## 2025-08-09 ENCOUNTER — MYC REFILL (OUTPATIENT)
Dept: FAMILY MEDICINE | Facility: CLINIC | Age: 30
End: 2025-08-09
Payer: COMMERCIAL

## 2025-08-09 DIAGNOSIS — F11.21 MODERATE OPIOID USE DISORDER, IN EARLY REMISSION (H): ICD-10-CM

## 2025-08-11 RX ORDER — BUPRENORPHINE HYDROCHLORIDE AND NALOXONE HYDROCHLORIDE DIHYDRATE 2; .5 MG/1; MG/1
2 TABLET SUBLINGUAL 2 TIMES DAILY
Qty: 28 TABLET | Refills: 0 | Status: SHIPPED | OUTPATIENT
Start: 2025-08-11 | End: 2025-08-18

## 2025-08-11 RX ORDER — BUPRENORPHINE HYDROCHLORIDE AND NALOXONE HYDROCHLORIDE DIHYDRATE 2; .5 MG/1; MG/1
2 TABLET SUBLINGUAL 2 TIMES DAILY
Qty: 120 TABLET | Refills: 0 | OUTPATIENT
Start: 2025-08-11

## 2025-08-18 ENCOUNTER — LAB (OUTPATIENT)
Dept: LAB | Facility: CLINIC | Age: 30
End: 2025-08-18
Payer: COMMERCIAL

## 2025-08-18 ENCOUNTER — VIRTUAL VISIT (OUTPATIENT)
Dept: FAMILY MEDICINE | Facility: CLINIC | Age: 30
End: 2025-08-18
Payer: COMMERCIAL

## 2025-08-18 DIAGNOSIS — M54.42 CHRONIC BILATERAL LOW BACK PAIN WITH LEFT-SIDED SCIATICA: ICD-10-CM

## 2025-08-18 DIAGNOSIS — F11.21 MODERATE OPIOID USE DISORDER, IN EARLY REMISSION (H): ICD-10-CM

## 2025-08-18 DIAGNOSIS — F11.21 MODERATE OPIOID USE DISORDER, IN EARLY REMISSION (H): Primary | ICD-10-CM

## 2025-08-18 DIAGNOSIS — G89.29 CHRONIC BILATERAL LOW BACK PAIN WITH LEFT-SIDED SCIATICA: ICD-10-CM

## 2025-08-18 LAB — CREAT UR-MCNC: 158 MG/DL

## 2025-08-18 PROCEDURE — 98006 SYNCH AUDIO-VIDEO EST MOD 30: CPT | Performed by: FAMILY MEDICINE

## 2025-08-18 PROCEDURE — G0482 DRUG TEST DEF 15-21 CLASSES: HCPCS

## 2025-08-18 PROCEDURE — 1126F AMNT PAIN NOTED NONE PRSNT: CPT | Performed by: FAMILY MEDICINE

## 2025-08-18 RX ORDER — BUPRENORPHINE HYDROCHLORIDE AND NALOXONE HYDROCHLORIDE DIHYDRATE 2; .5 MG/1; MG/1
2 TABLET SUBLINGUAL 2 TIMES DAILY
Qty: 120 TABLET | Refills: 0 | Status: SHIPPED | OUTPATIENT
Start: 2025-08-18 | End: 2025-09-17

## 2025-08-18 RX ORDER — BUPRENORPHINE HYDROCHLORIDE AND NALOXONE HYDROCHLORIDE DIHYDRATE 2; .5 MG/1; MG/1
2 TABLET SUBLINGUAL 2 TIMES DAILY
Qty: 120 TABLET | Refills: 0 | Status: SHIPPED | OUTPATIENT
Start: 2025-09-15 | End: 2025-10-15

## 2025-08-18 RX ORDER — BUPRENORPHINE HYDROCHLORIDE AND NALOXONE HYDROCHLORIDE DIHYDRATE 2; .5 MG/1; MG/1
2 TABLET SUBLINGUAL 2 TIMES DAILY
Qty: 120 TABLET | Refills: 0 | Status: SHIPPED | OUTPATIENT
Start: 2025-10-13 | End: 2025-11-12

## 2025-08-20 LAB
BUPRENORPHINE UR CFM-MCNC: 145 NG/ML
BUPRENORPHINE/CREAT UR: 92 NG/MG {CREAT}
NALOXONE UR CFM-MCNC: 275 NG/ML
NALOXONE: 174 NG/MG {CREAT}
NORBUPRENORPHINE UR CFM-MCNC: 860 NG/ML
NORBUPRENORPHINE/CREAT UR: 544 NG/MG {CREAT}

## 2025-08-26 ENCOUNTER — PATIENT OUTREACH (OUTPATIENT)
Dept: CARE COORDINATION | Facility: CLINIC | Age: 30
End: 2025-08-26
Payer: COMMERCIAL

## 2025-08-26 ENCOUNTER — HOSPITAL ENCOUNTER (EMERGENCY)
Facility: CLINIC | Age: 30
Discharge: HOME | End: 2025-08-26
Payer: OTHER MISCELLANEOUS

## 2025-08-26 ASSESSMENT — VISUAL ACUITY
OS: 20/20
OU: 20/20
OU: 1
OD: 20/20

## 2025-08-26 ASSESSMENT — COLUMBIA-SUICIDE SEVERITY RATING SCALE - C-SSRS
6. HAVE YOU EVER DONE ANYTHING, STARTED TO DO ANYTHING, OR PREPARED TO DO ANYTHING TO END YOUR LIFE?: NO
1. IN THE PAST MONTH, HAVE YOU WISHED YOU WERE DEAD OR WISHED YOU COULD GO TO SLEEP AND NOT WAKE UP?: NO
2. HAVE YOU ACTUALLY HAD ANY THOUGHTS OF KILLING YOURSELF IN THE PAST MONTH?: NO

## 2025-08-26 ASSESSMENT — ACTIVITIES OF DAILY LIVING (ADL): ADLS_ACUITY_SCORE: 41

## 2025-09-04 ENCOUNTER — PATIENT OUTREACH (OUTPATIENT)
Dept: CARE COORDINATION | Facility: CLINIC | Age: 30
End: 2025-09-04
Payer: COMMERCIAL